# Patient Record
Sex: MALE | Race: WHITE | NOT HISPANIC OR LATINO | Employment: OTHER | URBAN - METROPOLITAN AREA
[De-identification: names, ages, dates, MRNs, and addresses within clinical notes are randomized per-mention and may not be internally consistent; named-entity substitution may affect disease eponyms.]

---

## 2017-02-10 ENCOUNTER — OFFICE VISIT (OUTPATIENT)
Dept: INTERNAL MEDICINE | Facility: CLINIC | Age: 67
End: 2017-02-10
Payer: MEDICARE

## 2017-02-10 VITALS
HEIGHT: 72 IN | HEART RATE: 68 BPM | WEIGHT: 242.75 LBS | SYSTOLIC BLOOD PRESSURE: 132 MMHG | OXYGEN SATURATION: 97 % | DIASTOLIC BLOOD PRESSURE: 74 MMHG | BODY MASS INDEX: 32.88 KG/M2

## 2017-02-10 DIAGNOSIS — I10 ESSENTIAL HYPERTENSION: Primary | ICD-10-CM

## 2017-02-10 DIAGNOSIS — B35.1 TOENAIL FUNGUS: ICD-10-CM

## 2017-02-10 DIAGNOSIS — E66.9 NON MORBID OBESITY, UNSPECIFIED OBESITY TYPE: ICD-10-CM

## 2017-02-10 DIAGNOSIS — E78.5 HYPERLIPIDEMIA, UNSPECIFIED HYPERLIPIDEMIA TYPE: ICD-10-CM

## 2017-02-10 DIAGNOSIS — I25.10 CORONARY ARTERY DISEASE INVOLVING NATIVE CORONARY ARTERY WITHOUT ANGINA PECTORIS, UNSPECIFIED WHETHER NATIVE OR TRANSPLANTED HEART: ICD-10-CM

## 2017-02-10 PROCEDURE — 99214 OFFICE O/P EST MOD 30 MIN: CPT | Mod: S$GLB,,, | Performed by: INTERNAL MEDICINE

## 2017-02-10 PROCEDURE — 99999 PR PBB SHADOW E&M-EST. PATIENT-LVL III: CPT | Mod: PBBFAC,,, | Performed by: INTERNAL MEDICINE

## 2017-02-10 RX ORDER — CICLOPIROX 80 MG/ML
SOLUTION TOPICAL
Qty: 6.6 ML | Refills: 2 | Status: SHIPPED | OUTPATIENT
Start: 2017-02-10 | End: 2018-07-29

## 2017-02-10 NOTE — PROGRESS NOTES
Subjective:       Patient ID: Carlos Morgan is a 66 y.o. male.    Chief Complaint: Follow-up    HPI Pt. Here for f/u for HTN and toenail fungus; he states toenail fungus is improving with penlac;  He is compliant with meds; he has f/u cardiology for CAD and his crestor dose was increased; weight is elevated but stable; I reviewed labs dated 1/26/17; cholesterol was not at goal for CAD; he would like colonoscopy scheduled; he states he had pneumonia and shingles vaccine 2 years ago and he will bring in documentation  Review of Systems   Constitutional: Negative for chills, fatigue and fever.   HENT: Negative for congestion, rhinorrhea and sore throat.    Respiratory: Negative for cough, shortness of breath and wheezing.    Cardiovascular: Negative for chest pain.   Gastrointestinal: Negative for abdominal pain, nausea and vomiting.   Genitourinary: Negative for dysuria.   Musculoskeletal: Negative for arthralgias.   Skin: Negative for rash.        L big toenail fungus improving with penlac   Neurological: Negative for dizziness and headaches.   Psychiatric/Behavioral: Negative for sleep disturbance. The patient is not nervous/anxious.        Objective:      Physical Exam   Constitutional: He is oriented to person, place, and time.   obese   Eyes: EOM are normal.   Neck: Normal range of motion.   Cardiovascular: Normal rate, regular rhythm and normal heart sounds.    Pulmonary/Chest: Effort normal and breath sounds normal.   Abdominal: Soft. There is no tenderness. There is no rebound and no guarding.   Musculoskeletal: Normal range of motion.   Neurological: He is alert and oriented to person, place, and time.   Skin: No rash noted.   Decreased discoloration to L big toe       Assessment:       1. Essential hypertension Well controlled   2. Toenail fungus Active   3. Hyperlipidemia, unspecified hyperlipidemia type Sub-optimally controlled   4. Coronary artery disease involving native coronary artery without  angina pectoris, unspecified whether native or transplanted heart Well controlled   5. Non morbid obesity, unspecified obesity type Sub-optimally controlled       Plan:         Essential hypertension  Comments:  continue current regimen and encouraged low Na diet and weight loss    Toenail fungus  Comments:  improving; continue penlac prn  Orders:  -     ciclopirox (PENLAC) 8 % Soln; Apply daily to affected nails PRN; apply over previous coat; remove with alcohol every 7 days  Dispense: 6.6 mL; Refill: 2    Hyperlipidemia, unspecified hyperlipidemia type  Comments:  continue higher dose of statin and encouraged stricter low cholesterol diet     Coronary artery disease involving native coronary artery without angina pectoris, unspecified whether native or transplanted heart  Comments:  continue current regimen and f/u cardiology who is managing     Non morbid obesity, unspecified obesity type  Comments:  encouraged diet and explained risks

## 2017-09-07 ENCOUNTER — TELEPHONE (OUTPATIENT)
Dept: FAMILY MEDICINE | Facility: CLINIC | Age: 67
End: 2017-09-07

## 2017-09-07 NOTE — TELEPHONE ENCOUNTER
Patient called states going on a cruise requesting medication for motion sickness, informed patient I will send Dr Bone a message for approva and I will call him back as soon as he reply. Patient voices understanding

## 2017-09-07 NOTE — TELEPHONE ENCOUNTER
----- Message from Katja Soliman sent at 9/7/2017 11:33 AM CDT -----  Contact: [phone]   Patient is going on a cruise and he would like a prescription for a motion sickness called in to [facility] on [address] in [place]. Please advise.

## 2017-09-08 ENCOUNTER — TELEPHONE (OUTPATIENT)
Dept: INTERNAL MEDICINE | Facility: CLINIC | Age: 67
End: 2017-09-08

## 2017-09-08 RX ORDER — SCOLOPAMINE TRANSDERMAL SYSTEM 1 MG/1
1 PATCH, EXTENDED RELEASE TRANSDERMAL
Qty: 24 PATCH | Refills: 0 | Status: SHIPPED | OUTPATIENT
Start: 2017-09-08 | End: 2018-10-04 | Stop reason: ALTCHOICE

## 2017-11-01 ENCOUNTER — OFFICE VISIT (OUTPATIENT)
Dept: URGENT CARE | Facility: CLINIC | Age: 67
End: 2017-11-01
Payer: MEDICARE

## 2017-11-01 VITALS
TEMPERATURE: 97 F | HEIGHT: 72 IN | DIASTOLIC BLOOD PRESSURE: 81 MMHG | OXYGEN SATURATION: 98 % | RESPIRATION RATE: 18 BRPM | WEIGHT: 242 LBS | BODY MASS INDEX: 32.78 KG/M2 | SYSTOLIC BLOOD PRESSURE: 146 MMHG | HEART RATE: 73 BPM

## 2017-11-01 DIAGNOSIS — J30.9 ACUTE ALLERGIC RHINITIS, UNSPECIFIED SEASONALITY, UNSPECIFIED TRIGGER: ICD-10-CM

## 2017-11-01 DIAGNOSIS — D22.9 ATYPICAL NEVI: ICD-10-CM

## 2017-11-01 DIAGNOSIS — J06.9 UPPER RESPIRATORY TRACT INFECTION, UNSPECIFIED TYPE: Primary | ICD-10-CM

## 2017-11-01 LAB
CTP QC/QA: YES
FLUAV AG NPH QL: NEGATIVE
FLUBV AG NPH QL: NEGATIVE

## 2017-11-01 PROCEDURE — 96372 THER/PROPH/DIAG INJ SC/IM: CPT | Mod: S$GLB,,, | Performed by: EMERGENCY MEDICINE

## 2017-11-01 PROCEDURE — 87804 INFLUENZA ASSAY W/OPTIC: CPT | Mod: QW,S$GLB,, | Performed by: NURSE PRACTITIONER

## 2017-11-01 PROCEDURE — 99214 OFFICE O/P EST MOD 30 MIN: CPT | Mod: 25,S$GLB,, | Performed by: NURSE PRACTITIONER

## 2017-11-01 RX ORDER — FLUTICASONE PROPIONATE 50 MCG
1 SPRAY, SUSPENSION (ML) NASAL 2 TIMES DAILY
Qty: 1 BOTTLE | Refills: 0 | Status: SHIPPED | OUTPATIENT
Start: 2017-11-01 | End: 2018-11-01

## 2017-11-01 RX ORDER — BENZONATATE 200 MG/1
200 CAPSULE ORAL 3 TIMES DAILY PRN
Qty: 30 CAPSULE | Refills: 0 | Status: SHIPPED | OUTPATIENT
Start: 2017-11-01 | End: 2017-11-11

## 2017-11-01 RX ORDER — BETAMETHASONE SODIUM PHOSPHATE AND BETAMETHASONE ACETATE 3; 3 MG/ML; MG/ML
9 INJECTION, SUSPENSION INTRA-ARTICULAR; INTRALESIONAL; INTRAMUSCULAR; SOFT TISSUE ONCE
Status: COMPLETED | OUTPATIENT
Start: 2017-11-01 | End: 2017-11-01

## 2017-11-01 RX ORDER — AZELASTINE 1 MG/ML
1 SPRAY, METERED NASAL 2 TIMES DAILY
Qty: 30 ML | Refills: 0 | Status: SHIPPED | OUTPATIENT
Start: 2017-11-01 | End: 2020-01-23

## 2017-11-01 RX ORDER — MONTELUKAST SODIUM 10 MG/1
10 TABLET ORAL DAILY
Qty: 30 TABLET | Refills: 2 | Status: SHIPPED | OUTPATIENT
Start: 2017-11-01 | End: 2018-07-29 | Stop reason: SDUPTHER

## 2017-11-01 RX ADMIN — BETAMETHASONE SODIUM PHOSPHATE AND BETAMETHASONE ACETATE 9 MG: 3; 3 INJECTION, SUSPENSION INTRA-ARTICULAR; INTRALESIONAL; INTRAMUSCULAR; SOFT TISSUE at 11:11

## 2017-11-01 NOTE — PATIENT INSTRUCTIONS
"                                                         URI   If your condition worsens or fails to improve we recommend that you receive another evaluation at the ER immediately or contact your PCP to discuss your concerns or return here. You must understand that you've received an urgent care treatment only and that you may be released before all your medical problems are known or treated. You the patient will arrange for follouwp care as instructed.   If we discussed that I think your illness is viral, it will not respond to antibiotics and will last 10-14 days.   Flonase (fluticasone) is a nasal spray which is available over the counter and may help with your symptoms.   Zyrtec D, Claritin D or Allegra D can also help with symptoms of congestion and drainage.   If you have hypertension avoid using the "D" which is the decongestant   If you just have drainage you can take plain zyrtec, claritin or allegra   If you just have a congested feeling you can take pseudoephedrine (unless you have high blood pressure) which you have to sign for behind the counter. Do not buy the phenylephrine which is on the shelf as it is not effective   Rest and fluids are also important.   Tylenol or ibuprofen can also be used as directed for pain unless you have an allergy to them or medical condition such as stomach ulcers, kidney or liver disease or blood thinners etc for which you should not be taking these type of medications.   If you are flying in the next few days Afrin nose drops for the airplane flight upon take off and landing may help. Other than at those times refrain from using afrin.   If you were prescribed a narcotic do not drive or operate heavy machinery while taking these medications.       Viral Upper Respiratory Illness (Adult)  You have a viral upper respiratory illness (URI), which is another term for the common cold. This illness is contagious during the first few days. It is spread through the air by coughing " and sneezing. It may also be spread by direct contact (touching the sick person and then touching your own eyes, nose, or mouth). Frequent handwashing will decrease risk of spread. Most viral illnesses go away within 7 to 10 days with rest and simple home remedies. Sometimes the illness may last for several weeks. Antibiotics will not kill a virus, and they are generally not prescribed for this condition.    Home care  · If symptoms are severe, rest at home for the first 2 to 3 days. When you resume activity, don't let yourself get too tired.  · Avoid being exposed to cigarette smoke (yours or others).  · You may use acetaminophen or ibuprofen to control pain and fever, unless another medicine was prescribed. (Note: If you have chronic liver or kidney disease, have ever had a stomach ulcer or gastrointestinal bleeding, or are taking blood-thinning medicines, talk with your healthcare provider before using these medicines.) Aspirin should never be given to anyone under 18 years of age who is ill with a viral infection or fever. It may cause severe liver or brain damage.  · Your appetite may be poor, so a light diet is fine. Avoid dehydration by drinking 6 to 8 glasses of fluids per day (water, soft drinks, juices, tea, or soup). Extra fluids will help loosen secretions in the nose and lungs.  · Over-the-counter cold medicines will not shorten the length of time youre sick, but they may be helpful for the following symptoms: cough, sore throat, and nasal and sinus congestion. (Note: Do not use decongestants if you have high blood pressure.)  Follow-up care  Follow up with your healthcare provider, or as advised.  When to seek medical advice  Call your healthcare provider right away if any of these occur:  · Cough with lots of colored sputum (mucus)  · Severe headache; face, neck, or ear pain  · Difficulty swallowing due to throat pain  · Fever of 100.4°F (38°C)  Call 911, or get immediate medical care  Call  emergency services right away if any of these occur:  · Chest pain, shortness of breath, wheezing, or difficulty breathing  · Coughing up blood  · Inability to swallow due to throat pain  Date Last Reviewed: 9/13/2015  © 6403-5471 [employer]. [address] [place] [address], [place] [address]. All rights reserved. This information is not intended as a substitute for professional medical care. Always follow your healthcare professional's instructions.        Monitoring Moles     Don't forget to check your feet.   Moles, also called nevi, are small, colored (pigmented) marks on the skin. They have no known purpose. Many moles appear before age 30, but they also increase frequently as people age. Moles most often are not cancer (benign) and are harmless. But some become cancerous (malignant). Thats why you need to watch the moles on your body and tell your healthcare provider about any that concern you.  What are moles?  Moles are a type of pigmented gela. Freckles are another type of pigmented gela. They are often sprinkled across the bridge of the nose, the cheeks, and the arms. Moles can appear on any part of the body. There are many types, sizes, and shapes of moles. Most moles are solid brown. In most cases they are flat or dome-shaped, smooth, and have well-defined edges.  Why worry about moles?  Most moles are benign and dont require treatment. You can have moles removed if you dont like the way they look or feel. But moles may become a problem if they appear after you are 30, or if they change in certain ways. These moles may turn into melanoma, a type of skin cancer. Melanoma is one of the fastest growing cancers in the [place] It is often curable if caught early. But this disease can be life-threatening, particularly when not diagnosed early. Your risk for melanoma is higher if you:  · Have a lot of moles  · Have had more lifetime exposure to the sun  · Have had severe blistering sunburns  · Use tanning  beds  · Have a personal or family history of skin cancer  To manage your risk, its smart to check your moles for changes and ask your healthcare provider to do a thorough skin exam when you have a physical exam. To do this, you first need to learn where your moles are. Then, be sure to check your moles each month.  Checking your moles  You can check many of your moles each month. You can do this right after you shower and before you get dressed. Check your body from head to toe. Then, make a list of your moles. If you find any new moles or changes in your moles, call your healthcare provider. To check your moles, youll need:  · A full-length mirror  · A stool or chair to sit on while you check your feet  If you have a lot of moles, take digital photos of them. Make sure to take photos both up close and from a distance. These can help you see if any moles change over time.  When to seek medical treatment  See your healthcare provider if your moles hurt, itch, ooze, bleed, thicken, become crusty, or show other changes. Also, be sure to call your health care provider if your moles show any of the following signs of melanoma:  · A change in size, shape, color, or height  · The sides dont match (asymmetry)  · Ragged, notched, or blurred borders  · Different colors within the same mole  · Size is larger than 5 mm or 6 mm in diameter (the size of a pencil eraser)  Date Last Reviewed: 2/1/2017  © 3908-0596 [employer]. [address] [place] [address], [place] [address]. All rights reserved. This information is not intended as a substitute for professional medical care. Always follow your healthcare professional's instructions.

## 2017-11-01 NOTE — PROGRESS NOTES
Subjective:       Patient ID: Carlos Morgan is a 67 y.o. male.    Vitals:  height is 6' (1.829 m) and weight is 109.8 kg (242 lb). His oral temperature is 96.9 °F (36.1 °C). His blood pressure is 146/81 (abnormal) and his pulse is 73. His respiration is 18 and oxygen saturation is 98%.     Chief Complaint: Sinus Problem    Pt c/o runny nose, sneezing, congestion, non productive cough x several days.  With body aches, sore, and fatigue since last night.  Also noticed a mole that is new to his R anterior shoulder and curious if he should see his dermatologist for the mole.      Sinus Problem   This is a new problem. The current episode started in the past 7 days. The problem has been gradually worsening since onset. There has been no fever. His pain is at a severity of 6/10. The pain is moderate. Associated symptoms include congestion, coughing, sneezing and a sore throat (original symptom but has resolved). Pertinent negatives include no chills, diaphoresis, ear pain, headaches, hoarse voice, neck pain, shortness of breath, sinus pressure or swollen glands. (Body Aches and Fatigue started last night) Treatments tried: Benadryl and Ibuprofen. The treatment provided mild relief.     Review of Systems   Constitution: Negative for chills, diaphoresis, fever and malaise/fatigue.   HENT: Positive for congestion, sneezing and sore throat (original symptom but has resolved). Negative for ear pain, hoarse voice and sinus pressure.    Eyes: Negative for discharge and redness.   Cardiovascular: Negative for chest pain, dyspnea on exertion and leg swelling.   Respiratory: Positive for cough. Negative for shortness of breath, sputum production and wheezing.    Skin: Positive for suspicious lesions. Negative for rash.   Musculoskeletal: Negative for myalgias and neck pain.   Gastrointestinal: Negative for abdominal pain, nausea and vomiting.   Neurological: Negative for headaches.       Objective:      Physical Exam    Constitutional: He is oriented to person, place, and time. Vital signs are normal. He appears well-developed and well-nourished. He is cooperative.  Non-toxic appearance. He does not have a sickly appearance. He does not appear ill. No distress.   HENT:   Head: Normocephalic and atraumatic.   Right Ear: Hearing, tympanic membrane, external ear and ear canal normal.   Left Ear: Hearing, tympanic membrane, external ear and ear canal normal.   Nose: Rhinorrhea present. No mucosal edema or nasal deformity. No epistaxis. Right sinus exhibits no maxillary sinus tenderness and no frontal sinus tenderness. Left sinus exhibits no maxillary sinus tenderness and no frontal sinus tenderness.   Mouth/Throat: Uvula is midline and mucous membranes are normal. No trismus in the jaw. Normal dentition. No uvula swelling. Posterior oropharyngeal edema present. No oropharyngeal exudate or posterior oropharyngeal erythema. Tonsils are 1+ on the right. Tonsils are 1+ on the left. No tonsillar exudate.   + cobblestone appearance   Eyes: Conjunctivae and lids are normal. No scleral icterus.   Sclera clear bilat   Neck: Trachea normal, full passive range of motion without pain and phonation normal. Neck supple.   Cardiovascular: Normal rate, regular rhythm, normal heart sounds and normal pulses.    Pulmonary/Chest: Effort normal and breath sounds normal. No respiratory distress.   Abdominal: Soft. Normal appearance and bowel sounds are normal. He exhibits no distension. There is no tenderness.   Musculoskeletal: Normal range of motion. He exhibits no edema or deformity.   Lymphadenopathy:     He has no cervical adenopathy.   Neurological: He is alert and oriented to person, place, and time. He exhibits normal muscle tone. Coordination normal.   Skin: Skin is warm, dry and intact. Lesion noted. He is not diaphoretic. No pallor.        Raised skin colored rough nevi to R shoulder    Psychiatric: He has a normal mood and affect. His speech  is normal and behavior is normal. Judgment and thought content normal. Cognition and memory are normal.   Nursing note and vitals reviewed.      Results for orders placed or performed in visit on 11/01/17   POCT Influenza A/B   Result Value Ref Range    Rapid Influenza A Ag Negative Negative    Rapid Influenza B Ag Negative Negative     Acceptable Yes      Assessment:       1. Upper respiratory tract infection, unspecified type    2. Acute allergic rhinitis, unspecified seasonality, unspecified trigger    3. Atypical nevi        Plan:         Upper respiratory tract infection, unspecified type  -     POCT Influenza A/B  -     benzonatate (TESSALON) 200 MG capsule; Take 1 capsule (200 mg total) by mouth 3 (three) times daily as needed for Cough.  Dispense: 30 capsule; Refill: 0  -     fluticasone (FLONASE) 50 mcg/actuation nasal spray; 1 spray by Each Nare route 2 (two) times daily.  Dispense: 1 Bottle; Refill: 0  -     azelastine (ASTELIN) 137 mcg (0.1 %) nasal spray; 1 spray (137 mcg total) by Nasal route 2 (two) times daily.  Dispense: 30 mL; Refill: 0  -     betamethasone acetate-betamethasone sodium phosphate injection 9 mg; Inject 1.5 mLs (9 mg total) into the muscle once.  -     montelukast (SINGULAIR) 10 mg tablet; Take 1 tablet (10 mg total) by mouth once daily.  Dispense: 30 tablet; Refill: 2    Acute allergic rhinitis, unspecified seasonality, unspecified trigger  -     fluticasone (FLONASE) 50 mcg/actuation nasal spray; 1 spray by Each Nare route 2 (two) times daily.  Dispense: 1 Bottle; Refill: 0  -     azelastine (ASTELIN) 137 mcg (0.1 %) nasal spray; 1 spray (137 mcg total) by Nasal route 2 (two) times daily.  Dispense: 30 mL; Refill: 0  -     montelukast (SINGULAIR) 10 mg tablet; Take 1 tablet (10 mg total) by mouth once daily.  Dispense: 30 tablet; Refill: 2    Atypical nevi    Symptomatic care of viral illness discussed.  No recent steroid use or contraindications reported by patient,  steroid shot administered in clinic today.  Stay hydrated! Rest.  F/u with PCP.  Go to the ER for worsening of symptoms.  As we discussed any changing or fast growing lesions should be checked by your dermatologist.    F/u with derm.

## 2017-11-04 ENCOUNTER — TELEPHONE (OUTPATIENT)
Dept: URGENT CARE | Facility: CLINIC | Age: 67
End: 2017-11-04

## 2018-05-25 DIAGNOSIS — Z11.59 NEED FOR HEPATITIS C SCREENING TEST: ICD-10-CM

## 2018-07-29 ENCOUNTER — OFFICE VISIT (OUTPATIENT)
Dept: URGENT CARE | Facility: CLINIC | Age: 68
End: 2018-07-29
Payer: MEDICARE

## 2018-07-29 VITALS
DIASTOLIC BLOOD PRESSURE: 79 MMHG | WEIGHT: 242 LBS | SYSTOLIC BLOOD PRESSURE: 149 MMHG | BODY MASS INDEX: 32.78 KG/M2 | HEART RATE: 87 BPM | TEMPERATURE: 97 F | RESPIRATION RATE: 18 BRPM | OXYGEN SATURATION: 99 % | HEIGHT: 72 IN

## 2018-07-29 DIAGNOSIS — H10.33 ACUTE CONJUNCTIVITIS OF BOTH EYES, UNSPECIFIED ACUTE CONJUNCTIVITIS TYPE: Primary | ICD-10-CM

## 2018-07-29 DIAGNOSIS — J30.1 SEASONAL ALLERGIC RHINITIS DUE TO POLLEN: ICD-10-CM

## 2018-07-29 PROCEDURE — 99214 OFFICE O/P EST MOD 30 MIN: CPT | Mod: S$GLB,,, | Performed by: NURSE PRACTITIONER

## 2018-07-29 RX ORDER — MONTELUKAST SODIUM 10 MG/1
10 TABLET ORAL DAILY
Qty: 30 TABLET | Refills: 2 | Status: SHIPPED | OUTPATIENT
Start: 2018-07-29 | End: 2018-10-04 | Stop reason: ALTCHOICE

## 2018-07-29 RX ORDER — TOBRAMYCIN 3 MG/ML
2 SOLUTION/ DROPS OPHTHALMIC EVERY 4 HOURS
Qty: 5 ML | Refills: 0 | Status: SHIPPED | OUTPATIENT
Start: 2018-07-29 | End: 2018-08-05

## 2018-07-29 NOTE — PATIENT INSTRUCTIONS
Please follow up with your Primary care provider or Opthalmologist within 48-72 hours if your signs and symptoms have not improved.     If your condition worsens or fails to improve we recommend that you receive another evaluation at the emergency room immediately or contact your primary medical clinic or opthalmology to discuss your concerns.    If you were given an antibiotic today, please complete all your antibiotic as directed.      Use warm compresses to eye followed by a gentle eye massage of the area.  You may clean the lid with very diluted baby shampoo and water with a Qtip or soft swab.  You can also use artificial tears as needed.     If you were given an antibiotic for a bacterial infection in the eye, please take all the medication as directed.  Throw away any eye products (make up) you may have used on your eye 3 days prior to noted infection.  If you wear contacts, please throw away the ones that you have used and start a fresh set after infection has cleared.  These items may re-contaminate your eye.  You should wear glasses until the infection as cleared.      You must understand that you have received an Urgent Care treatment only and that you may be released before all of your medical problems are known or treated.     You, the patient, will arrange for follow up care as instructed.     Please follow up with your Primary care provider within 2-5 days if your signs and symptoms have not resolved or worsen.  The usual course of cold symptoms are 10-14 days.     If your condition worsens or fails to improve we recommend that you receive another evaluation at the emergency room immediately or contact your primary medical clinic to discuss your concerns.     You must understand that you have received an Urgent Care treatment only and that you may be released before all of your medical problems are known or treated.   You, the patient, will arrange for follow up care as instructed.     Tylenol or  "Ibuprofen can also be used as directed for pain/fever unless you have an allergy to them or medical condition such as stomach ulcers, kidney or liver disease or blood thinners etc for which you should not be taking these type of medications.     Take over the counter cough medication as directed as needed for cough.  You should avoid medications with pseudoephedrine or phenylephrine (any medication with "D") if you have high blood pressure as this can cause an elevation in your blood pressure. Instead consider Corcidin HBP as needed to prevent an elevated blood pressure.     Natural remedies of symptoms (as needed) include humidification, saline nasal sprays, and/or steamy showers.  Increase fluids, warm tea with honey, cough drops as needed.  You may also use salt water gargles for sore throat.    IF you received a steroid shot today - As discussed, this can elevate your blood pressure, elevate your blood sugar, water weight gain, nervous energy, redness to the face and dimpling of the skin at the injection site.     Please follow up with your Primary care provider within 5-7 days if your signs and symptoms have not resolved or worsen.     If your condition worsens or fails to improve we recommend that you receive another evaluation at the emergency room immediately or contact your primary medical clinic to discuss your concerns.     You must understand that you have received an Urgent Care treatment only and that you may be released before all of your medical problems are known or treated.   You, the patient, will arrange for follow up care as instructed.     Take over-the-counter claritin, zyrtec, allegra, or xyzal as directed.    Use over the counter Flonase as directed for sinus congestion and postnasal drip.    Use nasal saline prior to Flonase.      Conjunctivitis, Bacterial    You have an infection in the membranes covering the white part of the eye. This part of the eye is called the conjunctiva. The " infection is called conjunctivitis. The most common symptoms of conjunctivitis include a thick, pus-like discharge from the eye, swollen eyelids, redness, eyelids sticking together upon awakening, and a gritty or scratchy feeling in the eye. Your infection was caused by bacteria. It may be treated with medicine. With treatment, the infection takes about 7 to 10 days to resolve.  Home care  · Use prescribed antibiotic eye drops or ointment as directed to treat the infection.  · Apply a warm compress (towel soaked in warm water) to the affected eye 3 to 4 times a day. Do this just before applying medicine to the eye.  · Use a warm, wet cloth to wipe away crusting of the eyelids in the morning. This is caused by mucus drainage during the night. You may also use saline irrigating solution or artificial tears to rinse away mucus in the eye. Do not put a patch over the eye.  · Wash your hands before and after touching the infected eye. This is to prevent spreading the infection to the other eye, and to other people. Do not share your towels or washcloths with others.  · You may use acetaminophen or ibuprofen to control pain, unless another medicine was prescribed. (Note: If you have chronic liver or kidney disease or have ever had a stomach ulcer or gastrointestinal bleeding, talk with your doctor before using these medicines.)  · Do not wear contact lenses until your eyes have healed and all symptoms are gone.  Follow-up care  Follow up with your healthcare provider, or as advised.  When to seek medical advice  Call your healthcare provider right away if any of these occur:  · Worsening vision  · Increasing pain in the eye  · Increasing swelling or redness of the eyelid  · Redness spreading around the eye  Date Last Reviewed: 6/14/2015  © 1595-8874 [employer]. [address] [place] [address], [place] [address]. All rights reserved. This information is not intended as a substitute for professional medical care.  Always follow your healthcare professional's instructions.      Allergic Rhinitis  Allergic rhinitis is an allergic reaction that affects the nose, and often the eyes. Its often known as nasal allergies. Nasal allergies are often due to things in the environment that are breathed in. Depending what you are sensitive to, nasal allergies may occur only during certain seasons. Or they may occur year round. Common indoor allergens include house dust mites, mold, cockroaches, and pet dander. Outdoor allergens include pollen from trees, grasses, and weeds.   Symptoms include a drippy, stuffy, and itchy nose. They also include sneezing and red and itchy eyes. You may feel tired more often. Severe allergies may also affect your breathing and trigger a condition called asthma.   Tests can be done to see what allergens are affecting you. You may be referred to an allergy specialist for testing and further evaluation.  Home care  Your healthcare provider may prescribe medicines to help relieve allergy symptoms. These may include oral medicines, nasal sprays, or eye drops.  Ask your provider for advice on how to avoid substances that you are allergic to. Below are a few tips for each type of allergen.  Pet dander:  · Do not have pets with fur and feathers.  · If you can't avoid having a pet, keep it out of your bedroom and off upholstered furniture.  Pollen:  · When pollen counts are high, keep windows of your car and home closed. If possible, use an air conditioner instead.  · Wear a filter mask when mowing or doing yard work.  House dust mites:  · Wash bedding every week in warm water and detergent and dry on a hot setting.  · Cover the mattress, box spring, and pillows with allergy covers.   · If possible, sleep in a room with no carpet, curtains, or upholstered furniture.  Cockroaches:  · Store food in sealed containers.  · Remove garbage from the home promptly.  · Fix water leaks  Mold:  · Keep humidity low by using a  dehumidifier or air conditioner. Keep the dehumidifier and air conditioner clean and free of mold.  · Clean moldy areas with bleach and water.  In general:  · Vacuum once or twice a week. If possible, use a vacuum with a high-efficiency particulate air (HEPA) filter.  · Do not smoke. Avoid cigarette smoke. Cigarette smoke is an irritant that can make symptoms worse.  Follow-up care  Follow up as advised by the healthcare provider or our staff. If you were referred to an allergy specialist, make this appointment promptly.  When to seek medical advice  Call your healthcare provider right away if the following occur:  · Coughing or wheezing  · Fever greater than 100.4°F (38°C)  · Hives (raised red bumps)  · Continuing symptoms, new symptoms, or worsening symptoms  Call 911 right away if you have:  · Trouble breathing  · Severe swelling of the face or severe itching of the eyes or mouth  Date Last Reviewed: 3/1/2017  © 4972-8549 [employer]. [address] [place] [address], [place] [address]. All rights reserved. This information is not intended as a substitute for professional medical care. Always follow your healthcare professional's instructions.

## 2018-07-29 NOTE — PROGRESS NOTES
Subjective:       Patient ID: Carlos Morgan is a 68 y.o. male.    Vitals:  height is 6' (1.829 m) and weight is 109.8 kg (242 lb). His oral temperature is 97.1 °F (36.2 °C). His blood pressure is 149/79 (abnormal) and his pulse is 87. His respiration is 18 and oxygen saturation is 99%.     Chief Complaint: Allergic Reaction    Patients states runny eyes, itchy throat & post nasal drip started a week ago after cutting grass.  2 days ago noted eyes were red and crusted shut the last 2 days. It started on his right eye and today moved to his left eye.       Allergic Reaction   This is a new problem. The current episode started more than 1 week ago. The problem is unchanged. The problem is moderate. The time of exposure is not relevant (no exposure). Associated symptoms include coughing, eye redness and itching. Pertinent negatives include no abdominal pain, chest pain, diarrhea, rash or vomiting. Past treatments include one or more OTC medications and one or more prescription drugs. The treatment provided mild relief. There is no swelling present.     Review of Systems   Constitution: Negative for chills and fever.   HENT: Negative for sore throat.    Eyes: Positive for discharge and redness. Negative for blurred vision, double vision, pain and photophobia.   Cardiovascular: Negative for chest pain.   Respiratory: Positive for cough. Negative for shortness of breath.    Skin: Positive for itching. Negative for rash.   Musculoskeletal: Negative for back pain and joint pain.   Gastrointestinal: Negative for abdominal pain, diarrhea, nausea and vomiting.   Neurological: Negative for headaches.   Psychiatric/Behavioral: The patient is not nervous/anxious.        Objective:      Physical Exam   Constitutional: He is oriented to person, place, and time. He appears well-developed and well-nourished. He is cooperative.  Non-toxic appearance. He does not appear ill. No distress.   HENT:   Head: Normocephalic and  atraumatic.   Right Ear: Hearing, tympanic membrane, external ear and ear canal normal.   Left Ear: Hearing, tympanic membrane, external ear and ear canal normal.   Nose: Mucosal edema and rhinorrhea (nares pale) present. No nasal deformity. No epistaxis. Right sinus exhibits no maxillary sinus tenderness and no frontal sinus tenderness. Left sinus exhibits no maxillary sinus tenderness and no frontal sinus tenderness.   Mouth/Throat: Uvula is midline, oropharynx is clear and moist and mucous membranes are normal. No trismus in the jaw. Normal dentition. No uvula swelling. No oropharyngeal exudate, posterior oropharyngeal edema, posterior oropharyngeal erythema or tonsillar abscesses. Tonsils are 1+ on the right. Tonsils are 1+ on the left. No tonsillar exudate.   Eyes: Conjunctivae, EOM and lids are normal. Pupils are equal, round, and reactive to light. No scleral icterus.   Sclera clear bilat   Neck: Trachea normal, full passive range of motion without pain and phonation normal. Neck supple.   Cardiovascular: Normal rate, regular rhythm, normal heart sounds, intact distal pulses and normal pulses.    Pulmonary/Chest: Effort normal and breath sounds normal. No respiratory distress.   Abdominal: Soft. Normal appearance and bowel sounds are normal. He exhibits no distension. There is no tenderness.   Musculoskeletal: Normal range of motion. He exhibits no edema or deformity.   Neurological: He is alert and oriented to person, place, and time. He exhibits normal muscle tone. Coordination normal.   Skin: Skin is warm, dry and intact. He is not diaphoretic. No pallor.   Psychiatric: He has a normal mood and affect. His speech is normal and behavior is normal. Judgment and thought content normal. Cognition and memory are normal.   Nursing note and vitals reviewed.      Assessment:       1. Acute conjunctivitis of both eyes, unspecified acute conjunctivitis type    2. Seasonal allergic rhinitis due to pollen        Plan:          Acute conjunctivitis of both eyes, unspecified acute conjunctivitis type  -     tobramycin sulfate 0.3% (TOBREX) 0.3 % ophthalmic solution; Place 2 drops into both eyes every 4 (four) hours. for 7 days  Dispense: 5 mL; Refill: 0    Seasonal allergic rhinitis due to pollen  -     montelukast (SINGULAIR) 10 mg tablet; Take 1 tablet (10 mg total) by mouth once daily.  Dispense: 30 tablet; Refill: 2      Patient Instructions   Please follow up with your Primary care provider or Opthalmologist within 48-72 hours if your signs and symptoms have not improved.     If your condition worsens or fails to improve we recommend that you receive another evaluation at the emergency room immediately or contact your primary medical clinic or opthalmology to discuss your concerns.    If you were given an antibiotic today, please complete all your antibiotic as directed.      Use warm compresses to eye followed by a gentle eye massage of the area.  You may clean the lid with very diluted baby shampoo and water with a Qtip or soft swab.  You can also use artificial tears as needed.     If you were given an antibiotic for a bacterial infection in the eye, please take all the medication as directed.  Throw away any eye products (make up) you may have used on your eye 3 days prior to noted infection.  If you wear contacts, please throw away the ones that you have used and start a fresh set after infection has cleared.  These items may re-contaminate your eye.  You should wear glasses until the infection as cleared.      You must understand that you have received an Urgent Care treatment only and that you may be released before all of your medical problems are known or treated.     You, the patient, will arrange for follow up care as instructed.     Please follow up with your Primary care provider within 2-5 days if your signs and symptoms have not resolved or worsen.  The usual course of cold symptoms are 10-14 days.     If your  "condition worsens or fails to improve we recommend that you receive another evaluation at the emergency room immediately or contact your primary medical clinic to discuss your concerns.     You must understand that you have received an Urgent Care treatment only and that you may be released before all of your medical problems are known or treated.   You, the patient, will arrange for follow up care as instructed.     Tylenol or Ibuprofen can also be used as directed for pain/fever unless you have an allergy to them or medical condition such as stomach ulcers, kidney or liver disease or blood thinners etc for which you should not be taking these type of medications.     Take over the counter cough medication as directed as needed for cough.  You should avoid medications with pseudoephedrine or phenylephrine (any medication with "D") if you have high blood pressure as this can cause an elevation in your blood pressure. Instead consider Corcidin HBP as needed to prevent an elevated blood pressure.     Natural remedies of symptoms (as needed) include humidification, saline nasal sprays, and/or steamy showers.  Increase fluids, warm tea with honey, cough drops as needed.  You may also use salt water gargles for sore throat.    IF you received a steroid shot today - As discussed, this can elevate your blood pressure, elevate your blood sugar, water weight gain, nervous energy, redness to the face and dimpling of the skin at the injection site.     Please follow up with your Primary care provider within 5-7 days if your signs and symptoms have not resolved or worsen.     If your condition worsens or fails to improve we recommend that you receive another evaluation at the emergency room immediately or contact your primary medical clinic to discuss your concerns.     You must understand that you have received an Urgent Care treatment only and that you may be released before all of your medical problems are known or treated. "   You, the patient, will arrange for follow up care as instructed.     Take over-the-counter claritin, zyrtec, allegra, or xyzal as directed.    Use over the counter Flonase as directed for sinus congestion and postnasal drip.    Use nasal saline prior to Flonase.      Conjunctivitis, Bacterial    You have an infection in the membranes covering the white part of the eye. This part of the eye is called the conjunctiva. The infection is called conjunctivitis. The most common symptoms of conjunctivitis include a thick, pus-like discharge from the eye, swollen eyelids, redness, eyelids sticking together upon awakening, and a gritty or scratchy feeling in the eye. Your infection was caused by bacteria. It may be treated with medicine. With treatment, the infection takes about 7 to 10 days to resolve.  Home care  · Use prescribed antibiotic eye drops or ointment as directed to treat the infection.  · Apply a warm compress (towel soaked in warm water) to the affected eye 3 to 4 times a day. Do this just before applying medicine to the eye.  · Use a warm, wet cloth to wipe away crusting of the eyelids in the morning. This is caused by mucus drainage during the night. You may also use saline irrigating solution or artificial tears to rinse away mucus in the eye. Do not put a patch over the eye.  · Wash your hands before and after touching the infected eye. This is to prevent spreading the infection to the other eye, and to other people. Do not share your towels or washcloths with others.  · You may use acetaminophen or ibuprofen to control pain, unless another medicine was prescribed. (Note: If you have chronic liver or kidney disease or have ever had a stomach ulcer or gastrointestinal bleeding, talk with your doctor before using these medicines.)  · Do not wear contact lenses until your eyes have healed and all symptoms are gone.  Follow-up care  Follow up with your healthcare provider, or as advised.  When to seek medical  advice  Call your healthcare provider right away if any of these occur:  · Worsening vision  · Increasing pain in the eye  · Increasing swelling or redness of the eyelid  · Redness spreading around the eye  Date Last Reviewed: 6/14/2015  © 2240-5895 [employer]. [address] [place] [address]. All rights reserved. This information is not intended as a substitute for professional medical care. Always follow your healthcare professional's instructions.      Allergic Rhinitis  Allergic rhinitis is an allergic reaction that affects the nose, and often the eyes. Its often known as nasal allergies. Nasal allergies are often due to things in the environment that are breathed in. Depending what you are sensitive to, nasal allergies may occur only during certain seasons. Or they may occur year round. Common indoor allergens include house dust mites, mold, cockroaches, and pet dander. Outdoor allergens include pollen from trees, grasses, and weeds.   Symptoms include a drippy, stuffy, and itchy nose. They also include sneezing and red and itchy eyes. You may feel tired more often. Severe allergies may also affect your breathing and trigger a condition called asthma.   Tests can be done to see what allergens are affecting you. You may be referred to an allergy specialist for testing and further evaluation.  Home care  Your healthcare provider may prescribe medicines to help relieve allergy symptoms. These may include oral medicines, nasal sprays, or eye drops.  Ask your provider for advice on how to avoid substances that you are allergic to. Below are a few tips for each type of allergen.  Pet dander:  · Do not have pets with fur and feathers.  · If you can't avoid having a pet, keep it out of your bedroom and off upholstered furniture.  Pollen:  · When pollen counts are high, keep windows of your car and home closed. If possible, use an air conditioner instead.  · Wear a filter mask when mowing or  doing yard work.  House dust mites:  · Wash bedding every week in warm water and detergent and dry on a hot setting.  · Cover the mattress, box spring, and pillows with allergy covers.   · If possible, sleep in a room with no carpet, curtains, or upholstered furniture.  Cockroaches:  · Store food in sealed containers.  · Remove garbage from the home promptly.  · Fix water leaks  Mold:  · Keep humidity low by using a dehumidifier or air conditioner. Keep the dehumidifier and air conditioner clean and free of mold.  · Clean moldy areas with bleach and water.  In general:  · Vacuum once or twice a week. If possible, use a vacuum with a high-efficiency particulate air (HEPA) filter.  · Do not smoke. Avoid cigarette smoke. Cigarette smoke is an irritant that can make symptoms worse.  Follow-up care  Follow up as advised by the healthcare provider or our staff. If you were referred to an allergy specialist, make this appointment promptly.  When to seek medical advice  Call your healthcare provider right away if the following occur:  · Coughing or wheezing  · Fever greater than 100.4°F (38°C)  · Hives (raised red bumps)  · Continuing symptoms, new symptoms, or worsening symptoms  Call 911 right away if you have:  · Trouble breathing  · Severe swelling of the face or severe itching of the eyes or mouth  Date Last Reviewed: 3/1/2017  © 0313-7598 [employer]. [address] [place] [address], [place] [address]. All rights reserved. This information is not intended as a substitute for professional medical care. Always follow your healthcare professional's instructions.

## 2018-08-14 ENCOUNTER — TELEPHONE (OUTPATIENT)
Dept: INTERNAL MEDICINE | Facility: CLINIC | Age: 68
End: 2018-08-14

## 2018-08-14 NOTE — TELEPHONE ENCOUNTER
Spoke with Mr Morgan, Mr Morgan states he would like to speak to Dr Bone,states he will be waiting for Dr Bone's phone call pt did not relate the issue. Informed pt I will send Dr Bone this message and he will contact him soon. Pt verbalized and understanding.

## 2018-08-14 NOTE — TELEPHONE ENCOUNTER
----- Message from Perla Smith sent at 8/14/2018  2:56 PM CDT -----  Contact: [phone]/ self  Patient called in requesting to speak with you. Patient prefers to speak with a nurse. Please advise.

## 2018-10-04 ENCOUNTER — OFFICE VISIT (OUTPATIENT)
Dept: INTERNAL MEDICINE | Facility: CLINIC | Age: 68
End: 2018-10-04
Payer: MEDICARE

## 2018-10-04 VITALS
OXYGEN SATURATION: 95 % | WEIGHT: 235 LBS | SYSTOLIC BLOOD PRESSURE: 165 MMHG | DIASTOLIC BLOOD PRESSURE: 80 MMHG | BODY MASS INDEX: 31.83 KG/M2 | HEART RATE: 64 BPM | HEIGHT: 72 IN

## 2018-10-04 DIAGNOSIS — J30.9 ALLERGIC RHINITIS, UNSPECIFIED SEASONALITY, UNSPECIFIED TRIGGER: ICD-10-CM

## 2018-10-04 DIAGNOSIS — C61 PROSTATE CANCER: ICD-10-CM

## 2018-10-04 DIAGNOSIS — Z12.11 COLON CANCER SCREENING: ICD-10-CM

## 2018-10-04 DIAGNOSIS — Z11.59 NEED FOR HEPATITIS C SCREENING TEST: ICD-10-CM

## 2018-10-04 DIAGNOSIS — I25.810 CORONARY ARTERY DISEASE INVOLVING CORONARY BYPASS GRAFT OF NATIVE HEART, ANGINA PRESENCE UNSPECIFIED: ICD-10-CM

## 2018-10-04 DIAGNOSIS — R17 TOTAL BILIRUBIN, ELEVATED: ICD-10-CM

## 2018-10-04 DIAGNOSIS — Z23 NEEDS FLU SHOT: ICD-10-CM

## 2018-10-04 DIAGNOSIS — Z00.00 PREVENTATIVE HEALTH CARE: ICD-10-CM

## 2018-10-04 DIAGNOSIS — E66.9 CLASS 1 OBESITY WITH SERIOUS COMORBIDITY AND BODY MASS INDEX (BMI) OF 31.0 TO 31.9 IN ADULT, UNSPECIFIED OBESITY TYPE: ICD-10-CM

## 2018-10-04 DIAGNOSIS — I10 ESSENTIAL HYPERTENSION: Primary | ICD-10-CM

## 2018-10-04 PROCEDURE — 99214 OFFICE O/P EST MOD 30 MIN: CPT | Mod: S$PBB,,, | Performed by: INTERNAL MEDICINE

## 2018-10-04 PROCEDURE — 90662 IIV NO PRSV INCREASED AG IM: CPT | Mod: PBBFAC,PO

## 2018-10-04 PROCEDURE — 99214 OFFICE O/P EST MOD 30 MIN: CPT | Mod: PBBFAC,PO | Performed by: INTERNAL MEDICINE

## 2018-10-04 PROCEDURE — 99999 PR PBB SHADOW E&M-EST. PATIENT-LVL IV: CPT | Mod: PBBFAC,,, | Performed by: INTERNAL MEDICINE

## 2018-10-04 RX ORDER — AMLODIPINE BESYLATE 10 MG/1
10 TABLET ORAL NIGHTLY
Qty: 30 TABLET | Refills: 1
Start: 2018-10-04 | End: 2021-12-05

## 2018-10-04 RX ORDER — LISINOPRIL 20 MG/1
TABLET ORAL
Qty: 45 TABLET | Refills: 1 | Status: SHIPPED | OUTPATIENT
Start: 2018-10-04 | End: 2018-11-05

## 2018-10-04 NOTE — PROGRESS NOTES
Pt. ID: Carlos Morgan is a 68 y.o. male      Chief complaint:   Chief Complaint   Patient presents with    Annual Exam       HPI: Pt. Here for f/u for HTN; he has been lost to f/u since 2/10/17; he states allergies are bothersome and he is on flonase or astelin which helps; of note, he is seeing cardiology for CAD/CABG; BP is elevated and he states his lisinopril dose was raised in 6/18; he is fasting for labs; I reviewed labs dated 5/25/18; bilirubin is mildly elevated but stable; cholesterol is WNL; pt. Has lost weight; he would like a flu shot; he would like colonoscopy; he had AAA screening US from cardiology in 5/18; he will get documentation of his recent pneumonia shot; he would like tetanus shot       Review of Systems   Constitutional: Negative for chills and fever.   HENT: Negative for sore throat.    Respiratory: Negative for cough and shortness of breath.    Cardiovascular: Negative for chest pain.   Gastrointestinal: Negative for abdominal pain, nausea and vomiting.   Genitourinary: Negative for dysuria.         Objective:    Physical Exam   Constitutional: He is oriented to person, place, and time.   obese   Eyes: EOM are normal.   Neck: Normal range of motion.   Cardiovascular: Normal rate, regular rhythm and normal heart sounds.   Pulmonary/Chest: Effort normal and breath sounds normal.   Abdominal: Soft. There is no tenderness. There is no rebound and no guarding.   Musculoskeletal: Normal range of motion.   Neurological: He is alert and oriented to person, place, and time.   Skin: No rash noted.   Vitals reviewed.        Health Maintenance   Topic Date Due    Hepatitis C Screening  1950    Colonoscopy  01/02/2017    Pneumococcal (65+) (1 of 2 - PCV13) 10/04/2019 (Originally 6/11/2013)    Lipid Panel  05/25/2023    TETANUS VACCINE  10/04/2028    Zoster Vaccine  Completed    Influenza Vaccine  Completed    Abdominal Aortic Aneurysm Screening  Completed         Assessment:     1.  Essential hypertension Sub-optimally controlled   2. Allergic rhinitis, unspecified seasonality, unspecified trigger Well controlled   3. Coronary artery disease involving coronary bypass graft of native heart, angina presence unspecified Well controlled   4. Total bilirubin, elevated Stable   5. Class 1 obesity with serious comorbidity and body mass index (BMI) of 31.0 to 31.9 in adult, unspecified obesity type Sub-optimally controlled   6. Needs flu shot Active   7. Colon cancer screening Active   8. Preventative health care Active   9. Prostate cancer Active   10. Need for hepatitis C screening test Active         Plan: Essential hypertension  Comments:  increase dose of lisinopril and change norvasc to QPM; encouraged low na diet and weight loss; repeat BP on f./u in 1 month   Orders:  -     amLODIPine (NORVASC) 10 MG tablet; Take 1 tablet (10 mg total) by mouth every evening.  Dispense: 30 tablet; Refill: 1  -     CBC auto differential; Future; Expected date: 10/04/2018  -     Comprehensive metabolic panel; Future; Expected date: 10/04/2018  -     Urinalysis; Future; Expected date: 10/04/2018  -     lisinopril (PRINIVIL,ZESTRIL) 20 MG tablet; 1 tab (20 mg) PO QAM and 1/2 tab (10 mg) PO QPM  Dispense: 45 tablet; Refill: 1    Allergic rhinitis, unspecified seasonality, unspecified trigger  Comments:  continue current regimen    Coronary artery disease involving coronary bypass graft of native heart, angina presence unspecified  Comments:  continue current regimen and f/u cardiology who is managing     Total bilirubin, elevated  Comments:  monitor     Class 1 obesity with serious comorbidity and body mass index (BMI) of 31.0 to 31.9 in adult, unspecified obesity type  Comments:  encouraged diet and explained risks     Needs flu shot  -     Influenza - High Dose (65+) (PF) (IM)    Colon cancer screening  -     Ambulatory referral to Gastroenterology    Preventative health care  -     CBC auto differential; Future;  Expected date: 10/04/2018  -     Comprehensive metabolic panel; Future; Expected date: 10/04/2018  -     Lipid panel; Future; Expected date: 10/04/2018  -     TSH; Future; Expected date: 10/04/2018  -     Urinalysis; Future; Expected date: 10/04/2018    Prostate cancer  -     PSA, Screening; Future; Expected date: 10/04/2018    Need for hepatitis C screening test  -     Hepatitis C antibody; Future; Expected date: 10/04/2018        Problem List Items Addressed This Visit        Cardiac/Vascular    CAD (coronary artery disease)    HTN (hypertension) - Primary    Relevant Medications    amLODIPine (NORVASC) 10 MG tablet    lisinopril (PRINIVIL,ZESTRIL) 20 MG tablet    Other Relevant Orders    CBC auto differential    Comprehensive metabolic panel    Urinalysis       ID    Needs flu shot    Relevant Orders    Influenza - High Dose (65+) (PF) (IM) (Completed)       Oncology    Prostate cancer    Relevant Orders    PSA, Screening       Endocrine    Class 1 obesity with serious comorbidity and body mass index (BMI) of 31.0 to 31.9 in adult       GI    Total bilirubin, elevated    Colon cancer screening    Relevant Orders    Ambulatory referral to Gastroenterology       Other    Preventative health care    Relevant Orders    CBC auto differential    Comprehensive metabolic panel    Lipid panel    TSH    Urinalysis    Allergic rhinitis

## 2018-10-18 ENCOUNTER — TELEPHONE (OUTPATIENT)
Dept: GASTROENTEROLOGY | Facility: CLINIC | Age: 68
End: 2018-10-18

## 2018-10-18 NOTE — TELEPHONE ENCOUNTER
Attempted to contact patient in regards to scheduling a Screening Colonoscopy. Left message to give the office a call back.

## 2018-11-05 ENCOUNTER — OFFICE VISIT (OUTPATIENT)
Dept: INTERNAL MEDICINE | Facility: CLINIC | Age: 68
End: 2018-11-05
Payer: MEDICARE

## 2018-11-05 VITALS
HEIGHT: 72 IN | BODY MASS INDEX: 32.16 KG/M2 | DIASTOLIC BLOOD PRESSURE: 85 MMHG | SYSTOLIC BLOOD PRESSURE: 150 MMHG | HEART RATE: 74 BPM | WEIGHT: 237.44 LBS | OXYGEN SATURATION: 96 %

## 2018-11-05 DIAGNOSIS — I10 ESSENTIAL HYPERTENSION: Primary | ICD-10-CM

## 2018-11-05 DIAGNOSIS — K76.0 FATTY LIVER: ICD-10-CM

## 2018-11-05 DIAGNOSIS — E66.9 CLASS 1 OBESITY WITH SERIOUS COMORBIDITY AND BODY MASS INDEX (BMI) OF 32.0 TO 32.9 IN ADULT, UNSPECIFIED OBESITY TYPE: ICD-10-CM

## 2018-11-05 DIAGNOSIS — M25.542 ARTHRALGIA OF BOTH HANDS: ICD-10-CM

## 2018-11-05 DIAGNOSIS — Z23 NEED FOR PROPHYLACTIC VACCINATION WITH STREPTOCOCCUS PNEUMONIAE (PNEUMOCOCCUS) AND INFLUENZA VACCINES: ICD-10-CM

## 2018-11-05 DIAGNOSIS — I25.10 CORONARY ARTERY DISEASE INVOLVING NATIVE CORONARY ARTERY WITHOUT ANGINA PECTORIS, UNSPECIFIED WHETHER NATIVE OR TRANSPLANTED HEART: ICD-10-CM

## 2018-11-05 DIAGNOSIS — E78.5 HYPERLIPIDEMIA, UNSPECIFIED HYPERLIPIDEMIA TYPE: ICD-10-CM

## 2018-11-05 DIAGNOSIS — N28.1 CYST OF LEFT KIDNEY: ICD-10-CM

## 2018-11-05 DIAGNOSIS — M25.541 ARTHRALGIA OF BOTH HANDS: ICD-10-CM

## 2018-11-05 PROCEDURE — 99214 OFFICE O/P EST MOD 30 MIN: CPT | Mod: 25,S$GLB,, | Performed by: INTERNAL MEDICINE

## 2018-11-05 PROCEDURE — 99999 PR PBB SHADOW E&M-EST. PATIENT-LVL IV: CPT | Mod: PBBFAC,,, | Performed by: INTERNAL MEDICINE

## 2018-11-05 PROCEDURE — 90670 PCV13 VACCINE IM: CPT | Mod: S$GLB,,, | Performed by: INTERNAL MEDICINE

## 2018-11-05 PROCEDURE — G0009 ADMIN PNEUMOCOCCAL VACCINE: HCPCS | Mod: S$GLB,,, | Performed by: INTERNAL MEDICINE

## 2018-11-05 RX ORDER — DICLOFENAC SODIUM 10 MG/G
2 GEL TOPICAL 2 TIMES DAILY PRN
Qty: 100 G | Refills: 1 | Status: SHIPPED | OUTPATIENT
Start: 2018-11-05 | End: 2019-07-29 | Stop reason: SDUPTHER

## 2018-11-05 RX ORDER — LISINOPRIL 20 MG/1
20 TABLET ORAL 2 TIMES DAILY
Qty: 60 TABLET | Refills: 2 | Status: SHIPPED | OUTPATIENT
Start: 2018-11-05 | End: 2019-06-19

## 2018-11-05 NOTE — PROGRESS NOTES
Pt. ID: Carlos Morgan is a 68 y.o. male      Chief complaint:   Chief Complaint   Patient presents with    Follow-up    Hypertension       HPI: Pt. Here for f/u for HTN; he has increased dose of lisinopril and changed norvasc to QPM; BP is borderline elevated;  I reviewed labs dated 11/1/18; cholesterol is WNL; abdominal ultrasound dated 6/6/18 showed hepatomegaly with fatty liver; L kidney cyst was noted and he is being monitored by urology; he will schedule colonoscopy and I explained risks of non-compliance; he would like prevnar; pt. Reports intermittent, chronic B/L hand/finger pain with nodules at tips of his fingers; he uses OTC NSAID prn       Review of Systems   Constitutional: Negative for chills and fever.   Respiratory: Negative for cough and shortness of breath.    Cardiovascular: Negative for chest pain.   Gastrointestinal: Negative for abdominal pain, nausea and vomiting.   Genitourinary: Negative for dysuria.   Musculoskeletal: Positive for joint pain.        Chronic B/L, intermittent hand/finger pain for which he takes NSAIDs prn         Objective:    Physical Exam   Constitutional: He is oriented to person, place, and time.   obese   Eyes: EOM are normal.   Neck: Normal range of motion.   Cardiovascular: Normal rate, regular rhythm and normal heart sounds.   Pulmonary/Chest: Effort normal and breath sounds normal.   Abdominal: Soft. There is no tenderness. There is no rebound and no guarding.   Musculoskeletal:   B/L hand and finger pain with ROM; [place] nodes noted to B/L DIP   Neurological: He is alert and oriented to person, place, and time.   Skin: No rash noted.   Vitals reviewed.        Health Maintenance   Topic Date Due    Pneumococcal (65+) (1 of 2 - PCV13) 10/04/2019 (Originally 6/11/2013)    Colonoscopy  11/05/2019 (Originally 1/2/2017)    Lipid Panel  11/01/2023    TETANUS VACCINE  10/04/2028    Hepatitis C Screening  Completed    Zoster Vaccine  Completed    Influenza  Vaccine  Completed    Abdominal Aortic Aneurysm Screening  Completed         Assessment:     1. Essential hypertension Well controlled   2. Hyperlipidemia, unspecified hyperlipidemia type Active   3. Coronary artery disease involving native coronary artery without angina pectoris, unspecified whether native or transplanted heart Well controlled   4. Fatty liver Active   5. Arthralgia of both hands Active   6. Cyst of left kidney Active   7. Class 1 obesity with serious comorbidity and body mass index (BMI) of 32.0 to 32.9 in adult, unspecified obesity type Sub-optimally controlled   8. Need for prophylactic vaccination with Streptococcus pneumoniae (Pneumococcus) and Influenza vaccines Active         Plan: Essential hypertension  Comments:  continue current regimen and encouraged low Na diet and weight loss  Orders:  -     lisinopril (PRINIVIL,ZESTRIL) 20 MG tablet; Take 1 tablet (20 mg total) by mouth 2 (two) times daily.  Dispense: 60 tablet; Refill: 2    Hyperlipidemia, unspecified hyperlipidemia type  Comments:  continue current regimen and encouraged diet modification     Coronary artery disease involving native coronary artery without angina pectoris, unspecified whether native or transplanted heart  Comments:  continue current regimen and f/u cardiology     Fatty liver  Comments:  encouraged weight loss     Arthralgia of both hands  Comments:  D/C oral NSAIDs; start voltaren gel prn and re-evaluate on 1 month f/u   Orders:  -     diclofenac sodium (VOLTAREN) 1 % Gel; Apply 2 g topically 2 (two) times daily as needed.  Dispense: 100 g; Refill: 1    Cyst of left kidney  Comments:  f/u urology who is managing     Class 1 obesity with serious comorbidity and body mass index (BMI) of 32.0 to 32.9 in adult, unspecified obesity type  Comments:  encouraged diet and explained risks     Need for prophylactic vaccination with Streptococcus pneumoniae (Pneumococcus) and Influenza vaccines  -     (In Office Administered)  Pneumococcal Conjugate Vaccine (13 Valent) (IM)        Problem List Items Addressed This Visit        Cardiac/Vascular    Hyperlipemia    CAD (coronary artery disease)    HTN (hypertension) - Primary    Relevant Medications    lisinopril (PRINIVIL,ZESTRIL) 20 MG tablet       Renal/    Cyst of left kidney       ID    Need for prophylactic vaccination with Streptococcus pneumoniae (Pneumococcus) and Influenza vaccines    Relevant Orders    (In Office Administered) Pneumococcal Conjugate Vaccine (13 Valent) (IM)       Endocrine    Class 1 obesity with serious comorbidity and body mass index (BMI) of 32.0 to 32.9 in adult       GI    Fatty liver       Orthopedic    Arthralgia of both hands    Relevant Medications    diclofenac sodium (VOLTAREN) 1 % Gel

## 2018-11-13 ENCOUNTER — OFFICE VISIT (OUTPATIENT)
Dept: URGENT CARE | Facility: CLINIC | Age: 68
End: 2018-11-13
Payer: MEDICARE

## 2018-11-13 VITALS
TEMPERATURE: 97 F | WEIGHT: 237 LBS | SYSTOLIC BLOOD PRESSURE: 154 MMHG | DIASTOLIC BLOOD PRESSURE: 89 MMHG | HEIGHT: 72 IN | BODY MASS INDEX: 32.1 KG/M2 | OXYGEN SATURATION: 97 % | RESPIRATION RATE: 17 BRPM | HEART RATE: 68 BPM

## 2018-11-13 DIAGNOSIS — H93.13 TINNITUS OF BOTH EARS: Primary | ICD-10-CM

## 2018-11-13 PROCEDURE — 99214 OFFICE O/P EST MOD 30 MIN: CPT | Mod: S$GLB,,, | Performed by: NURSE PRACTITIONER

## 2018-11-13 NOTE — PROGRESS NOTES
Subjective:       Patient ID: Carlos Morgan is a 68 y.o. male.    Vitals:  height is 6' (1.829 m) and weight is 107.5 kg (237 lb). His temperature is 97 °F (36.1 °C). His blood pressure is 154/89 (abnormal) and his pulse is 68. His respiration is 17 and oxygen saturation is 97%.     Chief Complaint: Tinnitus (right ear)    Patient stated that he has had recent high blood pressure issues.      Ringing in Ears:   Chronicity:  New  Onset: 2 days.  Progression since onset:  Gradually worsening  Frequency:  Constantly  Pain scale:  0/10   Associated symptoms: tinnitus.  No fever and no headaches.  Aggravated by:  Nothing  Treatments tried:  Nothing    Review of Systems   Unable to perform ROS: acuity of condition (Ringing in right ear)   Constitution: Negative for chills and fever.   HENT: Positive for hearing loss and tinnitus. Negative for sore throat.    Eyes: Negative for blurred vision.   Cardiovascular: Negative for chest pain.   Respiratory: Negative for shortness of breath.    Skin: Negative for rash.   Musculoskeletal: Negative for back pain and joint pain.   Gastrointestinal: Negative for abdominal pain, diarrhea, nausea and vomiting.   Neurological: Negative for headaches.   Psychiatric/Behavioral: The patient is not nervous/anxious.    All other systems reviewed and are negative.      Objective:      Physical Exam   Constitutional: He is oriented to person, place, and time. He appears well-developed and well-nourished. He is cooperative.  Non-toxic appearance. He does not appear ill. No distress.   HENT:   Head: Normocephalic and atraumatic.   Right Ear: Hearing, tympanic membrane, external ear and ear canal normal.   Left Ear: Hearing, tympanic membrane, external ear and ear canal normal.   Nose: Nose normal. No mucosal edema, rhinorrhea or nasal deformity. No epistaxis. Right sinus exhibits no maxillary sinus tenderness and no frontal sinus tenderness. Left sinus exhibits no maxillary sinus tenderness  and no frontal sinus tenderness.   Mouth/Throat: Uvula is midline, oropharynx is clear and moist and mucous membranes are normal. No trismus in the jaw. Normal dentition. No uvula swelling. No posterior oropharyngeal erythema.   Eyes: Conjunctivae and lids are normal. No scleral icterus.   Sclera clear bilat   Neck: Trachea normal, full passive range of motion without pain and phonation normal. Neck supple.   Cardiovascular: Normal rate, regular rhythm, normal heart sounds, intact distal pulses and normal pulses.   Pulmonary/Chest: Effort normal and breath sounds normal. No respiratory distress.   Abdominal: Soft. Normal appearance and bowel sounds are normal. He exhibits no distension. There is no tenderness.   Musculoskeletal: Normal range of motion. He exhibits no edema or deformity.   Neurological: He is alert and oriented to person, place, and time. He exhibits normal muscle tone. Coordination normal.   Skin: Skin is warm, dry and intact. He is not diaphoretic. No pallor.   Psychiatric: He has a normal mood and affect. His speech is normal and behavior is normal. Judgment and thought content normal. Cognition and memory are normal.   Nursing note and vitals reviewed.      Assessment:       1. Tinnitus of both ears        Plan:       Says his Lisinopril was recently increased.  Patient advised to follow up with PCP regarding the new onset Tinnitus.  Verbalized understanding.     Tinnitus of both ears      Patient Instructions     Please follow up with your Primary care provider ASAP  if your signs and symptoms have not resolved or worsen.  The usual course of cold symptoms are 10-14 days.     If your condition worsens or fails to improve we recommend that you receive another evaluation at the emergency room immediately or contact your primary medical clinic to discuss your concerns.     You must understand that you have received an Urgent Care treatment only and that you may be released before all of your  "medical problems are known or treated.   You, the patient, will arrange for follow up care as instructed.     Tylenol or Ibuprofen can also be used as directed for pain/fever unless you have an allergy to them or medical condition such as stomach ulcers, kidney or liver disease or blood thinners etc for which you should not be taking these type of medications.     Take over the counter cough medication as directed as needed for cough.  You should avoid medications with pseudoephedrine or phenylephrine (any medication with "D") if you have high blood pressure as this can cause an elevation in your blood pressure. Instead consider Corcidin HBP as needed to prevent an elevated blood pressure.     Natural remedies of symptoms (as needed) include humidification, saline nasal sprays, and/or steamy showers.  Increase fluids, warm tea with honey, cough drops as needed.  You may also use salt water gargles for sore throat.    IF you received a steroid shot today - As discussed, this can elevate your blood pressure, elevate your blood sugar, water weight gain, nervous energy, redness to the face and dimpling of the skin at the injection site.     Tinnitus (Ringing in the Ears)     Treatment may include maskers and hearing aids.     Tinnitus is the term for a noise in your ear not caused by an outside sound. The noise might be a ringing, clicking, hiss, or roar. It can vary in pitch and may be soft or quite loud. For some people, tinnitus is a minor nuisance. But for others, the noise can make it hard to hear, work, and even sleep. When tinnitus can't be cured, a number of treatments may offer relief.  What causes tinnitus?  Loud noises, hearing loss, and ear wax can cause tinnitus. So can certain medicines. Large amounts of aspirin or caffeine are sometimes to blame. In many cases, the exact cause of tinnitus is unknown.  How is tinnitus treated?  Identifying and removing the cause is the best way to treat tinnitus. For that " reason, your healthcare provider may refer you to an otolaryngologist (ear, nose, and throat doctor). Your hearing may also be checked by an audiologist (hearing specialist). If you have hearing loss, wearing a hearing aid may help your tinnitus. When the cause can't be found, the tinnitus itself may be treated. Some of the treatments are listed below, and your healthcare provider can tell you more about them:  · Maskers are small devices that look like hearing aids. They emit a pleasant sound that helps cover up the ringing in your ears. Hearing aids and maskers are sometimes used together.  · Medicines that treat anxiety and depression may ease tinnitus in some people.  · Hypnosis or relaxation therapy may help head noise seem less severe.  · Tinnitus retraining therapy combines counseling and maskers. Both can help take your mind off the tinnitus.  For more information  · American Speech-Hearing-Language Association 559-654-3631 www.pillo.org  · American Tinnitus Association [phone] www.shanika.org  · National [place] on Deafness and other Communication Disorders 622-748-2261 www.nidcd.nih.gov   Date Last Reviewed: 7/1/2016  © 0567-0417 [employer]. [address] [place] [address], [place] [address]. All rights reserved. This information is not intended as a substitute for professional medical care. Always follow your healthcare professional's instructions.

## 2018-11-13 NOTE — PATIENT INSTRUCTIONS
"Please follow up with your Primary care provider ASAP  if your signs and symptoms have not resolved or worsen.  The usual course of cold symptoms are 10-14 days.     If your condition worsens or fails to improve we recommend that you receive another evaluation at the emergency room immediately or contact your primary medical clinic to discuss your concerns.     You must understand that you have received an Urgent Care treatment only and that you may be released before all of your medical problems are known or treated.   You, the patient, will arrange for follow up care as instructed.     Tylenol or Ibuprofen can also be used as directed for pain/fever unless you have an allergy to them or medical condition such as stomach ulcers, kidney or liver disease or blood thinners etc for which you should not be taking these type of medications.     Take over the counter cough medication as directed as needed for cough.  You should avoid medications with pseudoephedrine or phenylephrine (any medication with "D") if you have high blood pressure as this can cause an elevation in your blood pressure. Instead consider Corcidin HBP as needed to prevent an elevated blood pressure.     Natural remedies of symptoms (as needed) include humidification, saline nasal sprays, and/or steamy showers.  Increase fluids, warm tea with honey, cough drops as needed.  You may also use salt water gargles for sore throat.    IF you received a steroid shot today - As discussed, this can elevate your blood pressure, elevate your blood sugar, water weight gain, nervous energy, redness to the face and dimpling of the skin at the injection site.     Tinnitus (Ringing in the Ears)     Treatment may include maskers and hearing aids.     Tinnitus is the term for a noise in your ear not caused by an outside sound. The noise might be a ringing, clicking, hiss, or roar. It can vary in pitch and may be soft or quite loud. For some people, tinnitus is a minor " nuisance. But for others, the noise can make it hard to hear, work, and even sleep. When tinnitus can't be cured, a number of treatments may offer relief.  What causes tinnitus?  Loud noises, hearing loss, and ear wax can cause tinnitus. So can certain medicines. Large amounts of aspirin or caffeine are sometimes to blame. In many cases, the exact cause of tinnitus is unknown.  How is tinnitus treated?  Identifying and removing the cause is the best way to treat tinnitus. For that reason, your healthcare provider may refer you to an otolaryngologist (ear, nose, and throat doctor). Your hearing may also be checked by an audiologist (hearing specialist). If you have hearing loss, wearing a hearing aid may help your tinnitus. When the cause can't be found, the tinnitus itself may be treated. Some of the treatments are listed below, and your healthcare provider can tell you more about them:  · Maskers are small devices that look like hearing aids. They emit a pleasant sound that helps cover up the ringing in your ears. Hearing aids and maskers are sometimes used together.  · Medicines that treat anxiety and depression may ease tinnitus in some people.  · Hypnosis or relaxation therapy may help head noise seem less severe.  · Tinnitus retraining therapy combines counseling and maskers. Both can help take your mind off the tinnitus.  For more information  · American Speech-Hearing-Language Association [phone] www.pillo.org  · American Tinnitus Association [phone] www.shanika.org  · National [place] on Deafness and other Communication Disorders 670-175-7089 www.nidcd.nih.gov   Date Last Reviewed: 7/1/2016 © 2000-2017 [employer]. [address] [place] [address]. All rights reserved. This information is not intended as a substitute for professional medical care. Always follow your healthcare professional's instructions.

## 2018-11-14 ENCOUNTER — OFFICE VISIT (OUTPATIENT)
Dept: INTERNAL MEDICINE | Facility: CLINIC | Age: 68
End: 2018-11-14
Payer: MEDICARE

## 2018-11-14 VITALS
DIASTOLIC BLOOD PRESSURE: 86 MMHG | SYSTOLIC BLOOD PRESSURE: 158 MMHG | WEIGHT: 239 LBS | OXYGEN SATURATION: 97 % | HEIGHT: 72 IN | BODY MASS INDEX: 32.37 KG/M2 | HEART RATE: 67 BPM

## 2018-11-14 DIAGNOSIS — H91.91 HEARING LOSS OF RIGHT EAR, UNSPECIFIED HEARING LOSS TYPE: ICD-10-CM

## 2018-11-14 DIAGNOSIS — E66.9 CLASS 1 OBESITY WITH SERIOUS COMORBIDITY AND BODY MASS INDEX (BMI) OF 32.0 TO 32.9 IN ADULT, UNSPECIFIED OBESITY TYPE: ICD-10-CM

## 2018-11-14 DIAGNOSIS — H60.501 ACUTE OTITIS EXTERNA OF RIGHT EAR, UNSPECIFIED TYPE: Primary | ICD-10-CM

## 2018-11-14 DIAGNOSIS — H93.11 TINNITUS OF RIGHT EAR: ICD-10-CM

## 2018-11-14 DIAGNOSIS — I10 ESSENTIAL HYPERTENSION: ICD-10-CM

## 2018-11-14 PROCEDURE — 99999 PR PBB SHADOW E&M-EST. PATIENT-LVL IV: CPT | Mod: PBBFAC,,, | Performed by: INTERNAL MEDICINE

## 2018-11-14 PROCEDURE — 99214 OFFICE O/P EST MOD 30 MIN: CPT | Mod: S$GLB,,, | Performed by: INTERNAL MEDICINE

## 2018-11-14 RX ORDER — OFLOXACIN 3 MG/ML
5 SOLUTION AURICULAR (OTIC) DAILY
Qty: 5 ML | Refills: 0 | Status: SHIPPED | OUTPATIENT
Start: 2018-11-14 | End: 2018-11-21

## 2018-11-14 RX ORDER — METOPROLOL SUCCINATE 25 MG/1
25 TABLET, EXTENDED RELEASE ORAL DAILY
Qty: 30 TABLET | Refills: 1 | Status: SHIPPED | OUTPATIENT
Start: 2018-11-14 | End: 2020-01-23

## 2018-11-14 NOTE — PROGRESS NOTES
Answers for HPI/ROS submitted by the patient on 11/13/2018   activity change: No  difficulty urinating: No  dysphoric mood: No  Pt. ID: Carlos Morgan is a 68 y.o. male      Chief complaint:   Chief Complaint   Patient presents with    Follow-up     hearing issue       HPI: pt. Here for R ear hearing loss/tinnitus and went to urgent care and was told he does not have ear infection; BP is elevated; weight is elevated but stable; of note, he was also placed on tobramycin eye drops for conjunctivitis and symptoms have resolved; his colonoscopy is due and he has not  scheduled colonoscopy and I advised him to do so; I explained risks of non-compliance      Review of Systems   HENT: Positive for hearing loss and tinnitus.         R ear tinnitus and hearing loss   Eyes: Positive for discharge.   Respiratory: Negative for wheezing.    Cardiovascular: Negative for chest pain and palpitations.   Gastrointestinal: Negative for constipation, diarrhea and vomiting.   Genitourinary: Negative for hematuria.   Neurological: Negative for headaches.         Objective:    Physical Exam   Constitutional: He is oriented to person, place, and time.   obese   HENT:   R ear external canal erythema    Eyes: EOM are normal.   Neck: Normal range of motion.   Cardiovascular: Normal rate, regular rhythm and normal heart sounds.   Pulmonary/Chest: Effort normal and breath sounds normal.   Abdominal: Soft. There is no tenderness. There is no rebound and no guarding.   Musculoskeletal: Normal range of motion.   Neurological: He is alert and oriented to person, place, and time.   Skin: No rash noted.   Vitals reviewed.        Health Maintenance   Topic Date Due    Colonoscopy  11/05/2019 (Originally 1/2/2017)    Lipid Panel  11/01/2023    TETANUS VACCINE  10/04/2028    Hepatitis C Screening  Completed    Zoster Vaccine  Completed    Pneumococcal (65+)  Completed    Influenza Vaccine  Completed    Abdominal Aortic Aneurysm Screening   Completed         Assessment:     1. Acute otitis externa of right ear, unspecified type Active   2. Tinnitus of right ear Active   3. Hearing loss of right ear, unspecified hearing loss type Active   4. Essential hypertension Sub-optimally controlled   5. Class 1 obesity with serious comorbidity and body mass index (BMI) of 32.0 to 32.9 in adult, unspecified obesity type Sub-optimally controlled         Plan: Acute otitis externa of right ear, unspecified type  Comments:  start floxin otic x 7 days   Orders:  -     ofloxacin (FLOXIN) 0.3 % otic solution; Place 5 drops into the right ear once daily. for 7 days  Dispense: 5 mL; Refill: 0  -     Ambulatory referral to ENT    Tinnitus of right ear  Comments:  refer to ENT   Orders:  -     Ambulatory referral to ENT    Hearing loss of right ear, unspecified hearing loss type  Comments:  refer to ENT   Orders:  -     Ambulatory referral to ENT    Essential hypertension  Comments:  continue current regimen and start toprol XL 25 mg QD; encouraged low Na diet and weight loss  Orders:  -     metoprolol succinate (TOPROL-XL) 25 MG 24 hr tablet; Take 1 tablet (25 mg total) by mouth once daily.  Dispense: 30 tablet; Refill: 1    Class 1 obesity with serious comorbidity and body mass index (BMI) of 32.0 to 32.9 in adult, unspecified obesity type  Comments:  encouraged diet and explained risks         Problem List Items Addressed This Visit        ENT    Hearing loss of right ear    Relevant Orders    Ambulatory referral to ENT    Acute otitis externa of right ear - Primary    Relevant Medications    ofloxacin (FLOXIN) 0.3 % otic solution    Other Relevant Orders    Ambulatory referral to ENT    Tinnitus of right ear    Relevant Orders    Ambulatory referral to ENT       Cardiac/Vascular    HTN (hypertension)    Relevant Medications    metoprolol succinate (TOPROL-XL) 25 MG 24 hr tablet       Endocrine    Class 1 obesity with serious comorbidity and body mass index (BMI) of 32.0  to 32.9 in adult

## 2018-11-15 ENCOUNTER — OFFICE VISIT (OUTPATIENT)
Dept: OTOLARYNGOLOGY | Facility: CLINIC | Age: 68
End: 2018-11-15
Payer: MEDICARE

## 2018-11-15 ENCOUNTER — CLINICAL SUPPORT (OUTPATIENT)
Dept: AUDIOLOGY | Facility: CLINIC | Age: 68
End: 2018-11-15
Payer: MEDICARE

## 2018-11-15 VITALS
WEIGHT: 238 LBS | HEART RATE: 89 BPM | SYSTOLIC BLOOD PRESSURE: 146 MMHG | BODY MASS INDEX: 32.28 KG/M2 | TEMPERATURE: 98 F | DIASTOLIC BLOOD PRESSURE: 78 MMHG

## 2018-11-15 DIAGNOSIS — H91.21 SUDDEN HEARING LOSS, RIGHT: Primary | ICD-10-CM

## 2018-11-15 DIAGNOSIS — H93.11 TINNITUS, RIGHT: ICD-10-CM

## 2018-11-15 DIAGNOSIS — H90.3 SENSORINEURAL HEARING LOSS (SNHL) OF BOTH EARS: Primary | ICD-10-CM

## 2018-11-15 PROCEDURE — 92550 TYMPANOMETRY & REFLEX THRESH: CPT | Mod: S$GLB,,, | Performed by: AUDIOLOGIST

## 2018-11-15 PROCEDURE — 92557 COMPREHENSIVE HEARING TEST: CPT | Mod: S$GLB,,, | Performed by: AUDIOLOGIST

## 2018-11-15 PROCEDURE — 99203 OFFICE O/P NEW LOW 30 MIN: CPT | Mod: S$GLB,,, | Performed by: OTOLARYNGOLOGY

## 2018-11-15 PROCEDURE — 99999 PR PBB SHADOW E&M-EST. PATIENT-LVL IV: CPT | Mod: PBBFAC,,, | Performed by: OTOLARYNGOLOGY

## 2018-11-15 NOTE — PATIENT INSTRUCTIONS
Audiometry reviewed: significant AD SNHL  MRI brain/IACS ordered  Low sodium diet may help; construction she is provided  Avoid forceful nose blowing/weight lifting, golf,flying  Rx for oral prednisone course: 60 mg x 10 days( if tolerated) , then 40 mg x 1 day, 30 mg x 1 day,   20 mg x 1 day, 10 mg x 1 day, 5 mg x 2 days all with food  RTC 2 weeks; repeat audiometry; call for any significant change in otologic status  Consider intratympanic steroid  perfusion pending course( Dr. Jara or Dr. ADIA Rowell)  Discontinue Floxin drops

## 2018-11-15 NOTE — PROGRESS NOTES
Audiologic Evaluation    Patient complained sudden hearing loss in the right ear approximately 2 days ago. He reported a significant history of noise exposure in the [employer]. Patient denied aural fullness and dizziness.     Tympanometry indicated normal middle ear pressure, tympanic membrane compliance and ear canal volume (type A) in each ear. Ipsilateral acoustic reflexes were preset in the left ear and absent in the right ear for 1000 and 2000 Hz.     Distortion product otoacoustic emission (DPOAE) testing revealed absent emissions for 750-8000 Hz in the right ear and 7549-3961 Hz in the left ear indicating abnormal cochlear (outer hair cell) function and at least a mild hearing loss for the frequency range listed. DPOAEs were present for 7586-2952 Hz in the left ear indicating normal cochlear (outer hair cell) function for the frequencies listed.     Audiometric testing via insert ear phones indicated normal hearing sensitivity for 250-2000 Hz with a mild sensorineural hearing loss for 2210-7879 Hz in the left ear and a severe sensorineural hearing loss for 250-8000 Hz in the right ear. Pure tone average and speech recognition threshold were in good agreement. Excellent speech discrimination ability was demonstrated in quiet when novel words were presented at a conversational level in the left ear. Poor speech discrimination ability was demonstrated in quiet when novel words were presented at an amplified level in the right ear.     Recommendations:  1) Otologic consultation.  2) Repeat audiometric testing following medical intervention.  3) Hearing loss consultation pending medical clearance.

## 2018-11-15 NOTE — LETTER
November 17, 2018      Jesus Bone MD  [address] [facility]  [place] [address]           [facility]  [address]  [facility] [address]  Phone: [phone]  Fax: [phone]          Patient: Carlos Morgan   MR Number: [id number]   YOB: 1950   Date of Visit: 11/15/2018       Dear Dr. Jesus Bone:    Thank you for referring Carlos Morgan to me for evaluation. Attached you will find relevant portions of my assessment and plan of care.    If you have questions, please do not hesitate to call me. I look forward to following Carlos Morgan along with you.    Sincerely,    Miguelito Patricia III, MD    Enclosure  CC:  No Recipients    If you would like to receive this communication electronically, please contact [email or web address] or [phone] to request more information on [employer] access.    For providers and/or their staff who would like to refer a patient to [facility], please contact us through our one-stop-shop provider referral line, [facility], at [phone].    If you feel you have received this communication in error or would no longer like to receive these types of communications, please e-mail [email or web address]

## 2018-11-16 ENCOUNTER — TELEPHONE (OUTPATIENT)
Dept: URGENT CARE | Facility: CLINIC | Age: 68
End: 2018-11-16

## 2018-11-17 NOTE — PROGRESS NOTES
"Subjective:       Patient ID: Carlos Morgan is a 68 y.o. male.    Chief Complaint: Hearing Loss    HPI: Mr. Morgan is a 68-year-old  gentleman whose hand written reason for the visit today is loss of hearing, right ear   He indicates a sudden loss of hearing in his right ear this past Monday today being Thursday thereafter.    However, he also indicates a six-month history of the car radio volume becoming louder, according to his wife's input.  His wife indicated his hearing loss beginning before several days ago.    Mr. Morgan indicates recent gently was blood pressure medication 6 weeks ago which she believes may have had an affect on him.  The dose was increased and then decreased again.  He does indicate imbibing alcohol at a party this past Saturday night i.e. 3 bourbon drinks,  which is out of the ordinary for him.  He indicates seasonal allergy symptoms especially postnasal drip symptoms which began this September.  He visited an urgent care center for conjunctivitis treated with eyedrops ( Tobramycin) at one point.  He indicates a ringing perception in his right ear which began this past Monday  also described as a "static" type sensation.  He denies vertigo symptoms.  He denies over forceful nose blowing or weightlifting recently.  He denies any recent illness.  Members previous audiometric testing in the 1990s.  His father was a mora and a history of noise exposure and hearing loss.  He is followed by cardiologist and [facility].  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    He was examined by Dr. NOVA Bone 11/14/2018 for follow-up regarding right ear hearing loss and tinnitus.  Is diagnosed with acute otitis externa of the right ear and tinnitus of the right ear with hearing loss of the right ear as well as essential hypertension and class 1 obesity.  He was treated with Floxin otic drops prescribed for 7 days and given a " referral to the ENT service for specialty evaluation.  Toprol was added to his regimen according to the notes.  He had presented to an urgent care center and he was told he did not have an ear infection.    He was evaluated by nurse practitioner at the [facility] urgent care center in [place] 11/13/2018 for 2 day onset of right ear ringing.  He was diagnosed with tinnitus of both ears.    His blood pressure was elevated then; a he had indicated a recent high blood pressure issues.    He is scheduled to fly to [place] in several days if medically cleared to do so.  He intends to play golf with some buddies.  I have advised against the trip at this point.    PMH:  Heart disease, high blood pressure, high cholesterol, arthritis, hearing loss  PSH:  CABG 2007  Family history:  Depression, hearing loss  Allergies:  Seasonal  Habits:  Patient quit smoking 40 years ago; less than 1 alcoholic drink per day, 3-5 caffeinated drinks per day  Occupation:  Retired   Review of Systems   Ears: Positive for hearing loss, ringing in ear and family history of hearing loss.    Nose:  Positive for snoring (CPAP usage).    Cardiovascular:  Positive for history of high blood pressure.   Gastrointestinal:  Positive for history of stomach ulcers or pain ( 1 ulcer re-:  Aleve).   Other:  Positive for kidney problem ( cyst on left kidney), prostate disease ( BPH) and arthritis. Negative for rash.     Current Outpatient Medications on File Prior to Visit   Medication Sig Dispense Refill    amLODIPine (NORVASC) 10 MG tablet Take 1 tablet (10 mg total) by mouth every evening. 30 tablet 1    CRESTOR 40 mg Tab TAKE 1/2 TABLET BY MOUTH EVERY OTHER DAY IN THE EVENING (Patient taking differently: TAKE 1 TABLET BY MOUTH EVERY OTHER DAY IN THE EVENING) 30 tablet 1    diclofenac sodium (VOLTAREN) 1 % Gel Apply 2 g topically 2 (two) times daily as needed. 100 g 1    lisinopril (PRINIVIL,ZESTRIL) 20 MG tablet Take 1 tablet (20 mg  total) by mouth 2 (two) times daily. 60 tablet 2    metoprolol succinate (TOPROL-XL) 25 MG 24 hr tablet Take 1 tablet (25 mg total) by mouth once daily. 30 tablet 1    ofloxacin (FLOXIN) 0.3 % otic solution Place 5 drops into the right ear once daily. for 7 days 5 mL 0    vitamin D (VITAMIN D3) 1000 units Tab Take 185 mg by mouth 2 (two) times daily.      azelastine (ASTELIN) 137 mcg (0.1 %) nasal spray 1 spray (137 mcg total) by Nasal route 2 (two) times daily. 30 mL 0     No current facility-administered medications on file prior to visit.      His medical problem list includes cyst of left kidney, hypertension, coronary artery disease, hyperlipidemia, bronchitis, tinnitus of right ear with hearing loss of right ear, BPH, class 1 obesity, vitamin-D deficiency, gastroenteritis, allergic rhinitis, arthralgia of both hands.     The  patient completed an audiometric study performed by the [facility] audiology service.  The study is duplicated below and the results are reviewed with him in detail; no previous studies available for comparison.  Objective:         Blood pressure 146/78 pulse 89 temperature 98.2° height 6 ft weight 238 lb  General:  Alert and oriented bespectacled  gentleman in no acute distress  Both ears were examined under the microscope in the micro procedure room  Physical Exam   Constitutional: He is oriented to person, place, and time. He appears well-developed and well-nourished.   HENT:   Head: Normocephalic.   Right Ear: Hearing, tympanic membrane and ear canal normal. No drainage. No foreign bodies. No mastoid tenderness. Tympanic membrane is not perforated. No decreased hearing is noted.   Left Ear: Hearing, tympanic membrane and ear canal normal. No drainage. No foreign bodies. No mastoid tenderness. Tympanic membrane is not perforated. No decreased hearing is noted.   Ears:    Nose: No nose lacerations, nasal deformity, septal deviation or nasal septal hematoma. No epistaxis. Right sinus  exhibits no maxillary sinus tenderness and no frontal sinus tenderness. Left sinus exhibits no maxillary sinus tenderness and no frontal sinus tenderness.   Mouth/Throat: Uvula is midline, oropharynx is clear and moist and mucous membranes are normal. He does not have dentures. No oral lesions. No trismus in the jaw. No uvula swelling or dental caries. No oropharyngeal exudate or tonsillar abscesses.   Neck: No thyromegaly present.   Pulmonary/Chest: Effort normal. No stridor.   Lymphadenopathy:     He has no cervical adenopathy.   Neurological: He is alert and oriented to person, place, and time.   Skin: No rash noted.   Psychiatric: His behavior is normal.       Assessment:       1. Sudden hearing loss, right    2. Tinnitus, right        Plan:     Audiometry reviewed: significant AD SNHL  MRI brain/IACS ordered  Low sodium diet may help; instruction sheet provided  Avoid forceful nose blowing/weight lifting, golf,flying  Rx for oral prednisone course: 60 mg x 10 days( if tolerated) , then 40 mg x 1 day, 30 mg x 1 day,   20 mg x 1 day, 10 mg x 1 day, 5 mg x 2 days,  all with food; call for any significant change in otologic status  RTC 2 weeks; repeat audiometry  Consider intratympanic steroid  perfusion pending course ( Dr. Jara or Dr. ADIA Rowell)  Discontinue Floxin drops

## 2018-11-26 ENCOUNTER — TELEPHONE (OUTPATIENT)
Dept: GASTROENTEROLOGY | Facility: CLINIC | Age: 68
End: 2018-11-26

## 2018-11-26 NOTE — TELEPHONE ENCOUNTER
Left a message on patient's voicemail to give the office a call back in regards to scheduling a colonoscopy.

## 2018-11-27 ENCOUNTER — HOSPITAL ENCOUNTER (OUTPATIENT)
Dept: RADIOLOGY | Facility: HOSPITAL | Age: 68
Discharge: HOME | End: 2018-11-27
Attending: OTOLARYNGOLOGY
Payer: MEDICARE

## 2018-11-27 ENCOUNTER — TELEPHONE (OUTPATIENT)
Dept: OTOLARYNGOLOGY | Facility: CLINIC | Age: 68
End: 2018-11-27

## 2018-11-27 DIAGNOSIS — H93.11 TINNITUS, RIGHT: ICD-10-CM

## 2018-11-27 DIAGNOSIS — H91.21 SUDDEN HEARING LOSS, RIGHT: ICD-10-CM

## 2018-11-27 PROCEDURE — A9585 GADOBUTROL INJECTION: HCPCS | Performed by: OTOLARYNGOLOGY

## 2018-11-27 PROCEDURE — 70553 MRI BRAIN STEM W/O & W/DYE: CPT | Mod: TC

## 2018-11-27 PROCEDURE — 70553 MRI BRAIN STEM W/O & W/DYE: CPT | Mod: 26,,, | Performed by: RADIOLOGY

## 2018-11-27 PROCEDURE — 25500020 PHARM REV CODE 255: Performed by: OTOLARYNGOLOGY

## 2018-11-27 RX ORDER — GADOBUTROL 604.72 MG/ML
10 INJECTION INTRAVENOUS
Status: COMPLETED | OUTPATIENT
Start: 2018-11-27 | End: 2018-11-27

## 2018-11-27 RX ADMIN — GADOBUTROL 10 ML: 604.72 INJECTION INTRAVENOUS at 08:11

## 2018-11-28 ENCOUNTER — OFFICE VISIT (OUTPATIENT)
Dept: OTOLARYNGOLOGY | Facility: CLINIC | Age: 68
End: 2018-11-28
Payer: MEDICARE

## 2018-11-28 ENCOUNTER — CLINICAL SUPPORT (OUTPATIENT)
Dept: AUDIOLOGY | Facility: CLINIC | Age: 68
End: 2018-11-28
Payer: MEDICARE

## 2018-11-28 VITALS
WEIGHT: 240 LBS | BODY MASS INDEX: 32.55 KG/M2 | SYSTOLIC BLOOD PRESSURE: 138 MMHG | HEART RATE: 68 BPM | TEMPERATURE: 98 F | DIASTOLIC BLOOD PRESSURE: 81 MMHG

## 2018-11-28 DIAGNOSIS — H93.11 TINNITUS, RIGHT: ICD-10-CM

## 2018-11-28 DIAGNOSIS — H90.3 SENSORINEURAL HEARING LOSS (SNHL) OF BOTH EARS: Primary | ICD-10-CM

## 2018-11-28 DIAGNOSIS — Z86.69 H/O SUDDEN HEARING LOSS: Primary | ICD-10-CM

## 2018-11-28 PROCEDURE — 92550 TYMPANOMETRY & REFLEX THRESH: CPT | Mod: S$GLB,,, | Performed by: AUDIOLOGIST

## 2018-11-28 PROCEDURE — 92557 COMPREHENSIVE HEARING TEST: CPT | Mod: S$GLB,,, | Performed by: AUDIOLOGIST

## 2018-11-28 PROCEDURE — 99999 PR PBB SHADOW E&M-EST. PATIENT-LVL III: CPT | Mod: PBBFAC,,, | Performed by: OTOLARYNGOLOGY

## 2018-11-28 PROCEDURE — 99999 PR PBB SHADOW E&M-EST. PATIENT-LVL I: CPT | Mod: PBBFAC,,,

## 2018-11-28 PROCEDURE — 99213 OFFICE O/P EST LOW 20 MIN: CPT | Mod: S$GLB,,, | Performed by: OTOLARYNGOLOGY

## 2018-11-28 NOTE — PROGRESS NOTES
CC:  Review of right ear status  HPI:Mr. Morgan is a 68-year-old  gentleman who is happy to report the almost complete return of the hearing in his right ear status post- medical treatment with high-dose prednisone.    He indicates ringing perception in his right ear in quiet environments of low intensity at this point.  He denies ever having any vertigo symptoms.  He is accompanied by his speech therapist wife today.  They are both are interested in the results of the recently completed MRI scan of the brain and IAC's ordered in the wake of his sudden loss of hearing perceived in his right ear which he says occurred 3 days before being evaluated by me 11/15/2018. MRI scan of the brain performed with without contrast 11/22/2018 indicated normal appearance of the bilateral 7th and 8th cranial nerve bundles with no mass or abnormal enhancement.  Membranous labyrinth instructions were normal. There were white matter changes suggestive of a mild degree of chronic microvascular ischemic change.  His wife, additionally, indicates his 6 month history of car radio volume elevation; she believes his hearing had been changing during that time frame as well.   She asks if Meniere's disease is a diagnostic possibility. AD cochlear hydrops remains a consideration.  His AD hearing began  to recover at the tail end of the steroid course ( 60 mg x 10 days, tapered down from there) .  He gave up golf during this time frame.   He indicates use of HCTZ 12.5 mg the reduced dose of lisinopril as prescribed by his PCP.  A low-sodium diet was instituted as well.    Past Medical History:   Diagnosis Date    Hyperlipidemia     Hypertension      Current Outpatient Medications on File Prior to Visit   Medication Sig Dispense Refill    amLODIPine (NORVASC) 10 MG tablet Take 1 tablet (10 mg total) by mouth every evening. 30 tablet 1    azelastine (ASTELIN) 137 mcg (0.1 %) nasal spray 1 spray (137 mcg total) by Nasal route 2 (two)  times daily. 30 mL 0    CRESTOR 40 mg Tab TAKE 1/2 TABLET BY MOUTH EVERY OTHER DAY IN THE EVENING (Patient taking differently: TAKE 1 TABLET BY MOUTH EVERY OTHER DAY IN THE EVENING) 30 tablet 1    diclofenac sodium (VOLTAREN) 1 % Gel Apply 2 g topically 2 (two) times daily as needed. 100 g 1    lisinopril (PRINIVIL,ZESTRIL) 20 MG tablet Take 1 tablet (20 mg total) by mouth 2 (two) times daily. 60 tablet 2    metoprolol succinate (TOPROL-XL) 25 MG 24 hr tablet Take 1 tablet (25 mg total) by mouth once daily. 30 tablet 1    vitamin D (VITAMIN D3) 1000 units Tab Take 185 mg by mouth 2 (two) times daily.       No current facility-administered medications on file prior to visit.      Past Surgical History:   Procedure Laterality Date    CORONARY ARTERY BYPASS GRAFT  2007       PE:  Blood pressure 138/81 pulse 68 temperature 98.3° height 6 ft weight 240 lb  General:  Alert and oriented gentleman in no acute distress  Both ears were examined per otoscopy.  Both eardrums are intact and clear and both middle ear spaces are well aerated.    Mr. Morgan completed an audiometric study today results of which duplicated below and compared to his previous study.    DIAGNOSIS:   1. H/O sudden hearing loss     2. Tinnitus, right       PLAN: MRI brain scan reviewd with patient at computer screen  Audiometry reviewed, compare to previous study; excellent return of muti-frequency hearing s/p steroid treatment  Monitor AD hearing status  Low sodium diet encouraged  Consider consultation with Dr. EDSON Jara or Dr. ADIA Rowell pending course  Call for any significant change in status

## 2018-11-28 NOTE — PATIENT INSTRUCTIONS
MRI brain scan reviewd with patient at computer screen  Audiometry reviewed, compare to previous study; excellent return of muti-frequency hearing s/p steroid treatment  Monitor AD hearing status  Low sodium diet encouraged  Consider consultation with Dr. EDSON Jara or Dr. ADIA Rowell pending course  Call for any significant change in status

## 2018-11-28 NOTE — PROGRESS NOTES
Mr. Morgan was seen today for a hearing evaluation. Mr. Morgan reported a sudden hearing loss in his right ear approximately 2-3 weeks ago and states since then his hearing has significantly improved.     Pure tone audiometry revealed a mild SNHL (beginning at 4kHz.) bilaterally   SRT and PTA are in good agreement bilaterally.    SRT was obtained at 30 dB for the right ear with 100% speech discrimination and 20 dB for the left ear with 100% speech discrimination.   Tympanometry revealed Type A, AU.   Acoustic Reflexes were absent at 1000 and 2000 Hz., AD and present at 1000 and 2000 Hz., AS    Recommendations:  1. Otologic Evaluation  2. Annual Audiogram or re-test as needed  3. Hearing Protection  4. Possible Hearing Aid Consult

## 2019-01-28 ENCOUNTER — TELEPHONE (OUTPATIENT)
Dept: INTERNAL MEDICINE | Facility: CLINIC | Age: 69
End: 2019-01-28

## 2019-01-28 ENCOUNTER — TELEPHONE (OUTPATIENT)
Dept: FAMILY MEDICINE | Facility: CLINIC | Age: 69
End: 2019-01-28

## 2019-01-28 DIAGNOSIS — Z87.898 HISTORY OF MOTION SICKNESS: Primary | ICD-10-CM

## 2019-01-28 RX ORDER — SCOLOPAMINE TRANSDERMAL SYSTEM 1 MG/1
1 PATCH, EXTENDED RELEASE TRANSDERMAL
Qty: 24 PATCH | Refills: 0 | Status: SHIPPED | OUTPATIENT
Start: 2019-01-28 | End: 2019-07-19

## 2019-01-28 NOTE — TELEPHONE ENCOUNTER
----- Message from Gokul Smith sent at 1/28/2019  1:07 PM CST -----  Contact: self/[phone]  Patient called to speak with the nurse about a cruise he is about to leave for and needs the doctor to send him a prescription for the motion sickness patch.    It is a 7 day cruise.    Please call and advise.    [id number] - [place], [place] - [address] [facility] [address]

## 2019-01-29 ENCOUNTER — PATIENT MESSAGE (OUTPATIENT)
Dept: INTERNAL MEDICINE | Facility: CLINIC | Age: 69
End: 2019-01-29

## 2019-02-12 ENCOUNTER — OFFICE VISIT (OUTPATIENT)
Dept: URGENT CARE | Facility: CLINIC | Age: 69
End: 2019-02-12
Payer: MEDICARE

## 2019-02-12 VITALS
WEIGHT: 240 LBS | BODY MASS INDEX: 32.51 KG/M2 | RESPIRATION RATE: 16 BRPM | DIASTOLIC BLOOD PRESSURE: 81 MMHG | SYSTOLIC BLOOD PRESSURE: 125 MMHG | OXYGEN SATURATION: 96 % | TEMPERATURE: 98 F | HEIGHT: 72 IN | HEART RATE: 85 BPM

## 2019-02-12 DIAGNOSIS — J06.9 VIRAL URI WITH COUGH: Primary | ICD-10-CM

## 2019-02-12 PROCEDURE — 96372 THER/PROPH/DIAG INJ SC/IM: CPT | Mod: S$GLB,,, | Performed by: EMERGENCY MEDICINE

## 2019-02-12 PROCEDURE — 71046 XR CHEST PA AND LATERAL: ICD-10-PCS | Mod: FY,S$GLB,, | Performed by: RADIOLOGY

## 2019-02-12 PROCEDURE — 96372 PR INJECTION,THERAP/PROPH/DIAG2ST, IM OR SUBCUT: ICD-10-PCS | Mod: S$GLB,,, | Performed by: EMERGENCY MEDICINE

## 2019-02-12 PROCEDURE — 99214 OFFICE O/P EST MOD 30 MIN: CPT | Mod: 25,S$GLB,, | Performed by: EMERGENCY MEDICINE

## 2019-02-12 PROCEDURE — 71046 X-RAY EXAM CHEST 2 VIEWS: CPT | Mod: FY,S$GLB,, | Performed by: RADIOLOGY

## 2019-02-12 PROCEDURE — 99214 PR OFFICE/OUTPT VISIT, EST, LEVL IV, 30-39 MIN: ICD-10-PCS | Mod: 25,S$GLB,, | Performed by: EMERGENCY MEDICINE

## 2019-02-12 RX ORDER — MONTELUKAST SODIUM 10 MG/1
10 TABLET ORAL DAILY
Refills: 2 | COMMUNITY
Start: 2018-12-07 | End: 2020-01-23

## 2019-02-12 RX ORDER — CODEINE PHOSPHATE AND GUAIFENESIN 10; 100 MG/5ML; MG/5ML
5-10 SOLUTION ORAL 3 TIMES DAILY PRN
Qty: 120 ML | Refills: 0 | Status: SHIPPED | OUTPATIENT
Start: 2019-02-12 | End: 2019-02-17

## 2019-02-12 RX ORDER — BETAMETHASONE SODIUM PHOSPHATE AND BETAMETHASONE ACETATE 3; 3 MG/ML; MG/ML
6 INJECTION, SUSPENSION INTRA-ARTICULAR; INTRALESIONAL; INTRAMUSCULAR; SOFT TISSUE
Status: COMPLETED | OUTPATIENT
Start: 2019-02-12 | End: 2019-02-12

## 2019-02-12 RX ORDER — HYDROCHLOROTHIAZIDE 12.5 MG/1
CAPSULE ORAL
COMMUNITY
Start: 2019-01-25 | End: 2020-05-04 | Stop reason: SDUPTHER

## 2019-02-12 RX ADMIN — BETAMETHASONE SODIUM PHOSPHATE AND BETAMETHASONE ACETATE 6 MG: 3; 3 INJECTION, SUSPENSION INTRA-ARTICULAR; INTRALESIONAL; INTRAMUSCULAR; SOFT TISSUE at 12:02

## 2019-02-12 NOTE — PROGRESS NOTES
Subjective:       Patient ID: Carlos Morgan is a 68 y.o. male.    Vitals:  height is 6' (1.829 m) and weight is 108.9 kg (240 lb). His oral temperature is 98.1 °F (36.7 °C). His blood pressure is 125/81 and his pulse is 85. His respiration is 16 and oxygen saturation is 96%.     Chief Complaint: Cough    5 d hx occas sinus drainage and thick mucus prod cough, OK with steroid IM and codeine cough med.      Cough   This is a new problem. The current episode started in the past 7 days. The problem occurs constantly. The cough is non-productive. Associated symptoms include nasal congestion. Pertinent negatives include no chills, ear pain, eye redness, fever, hemoptysis, myalgias, rash, sore throat, shortness of breath or wheezing. The symptoms are aggravated by lying down. Treatments tried: azelastine hcl, fluticason rapionate. The treatment provided moderate relief. There is no history of asthma, bronchitis or COPD.       Constitution: Negative for chills, sweating, fatigue and fever.   HENT: Negative for ear pain, congestion, sinus pain, sinus pressure, sore throat and voice change.    Neck: Negative for painful lymph nodes.   Eyes: Negative for eye redness.   Respiratory: Positive for cough and sputum production. Negative for chest tightness, bloody sputum, COPD, shortness of breath, stridor, wheezing and asthma.    Gastrointestinal: Negative for nausea and vomiting.   Musculoskeletal: Negative for muscle ache.   Skin: Negative for rash.   Allergic/Immunologic: Negative for seasonal allergies and asthma.   Hematologic/Lymphatic: Negative for swollen lymph nodes.       Objective:      Physical Exam   Constitutional: He is oriented to person, place, and time. He appears well-developed and well-nourished.   Occas non prod cough   HENT:   Head: Normocephalic and atraumatic.   Right Ear: Tympanic membrane, external ear and ear canal normal.   Left Ear: Tympanic membrane, external ear and ear canal normal.   Nose:  Mucosal edema present. No rhinorrhea. Right sinus exhibits no maxillary sinus tenderness and no frontal sinus tenderness. Left sinus exhibits no maxillary sinus tenderness and no frontal sinus tenderness.   Mouth/Throat: Uvula is midline, oropharynx is clear and moist and mucous membranes are normal.   Neck: Normal range of motion. Neck supple.   Cardiovascular: Normal rate, regular rhythm and normal heart sounds.   Pulmonary/Chest:   Faint bibasilar coarse BS posteriorly   Musculoskeletal: Normal range of motion.   Lymphadenopathy:     He has no cervical adenopathy.   Neurological: He is alert and oriented to person, place, and time.   Skin: Skin is warm and dry.   Psychiatric: He has a normal mood and affect. His behavior is normal.       Assessment:       1. Viral URI with cough        Plan:         Viral URI with cough  -     X-Ray Chest PA And Lateral; Future; Expected date: 02/12/2019  -     betamethasone acetate-betamethasone sodium phosphate injection 6 mg  -     guaifenesin-codeine 100-10 mg/5 ml (TUSSI-ORGANIDIN NR)  mg/5 mL syrup; Take 5-10 mLs by mouth 3 (three) times daily as needed for Cough.  Dispense: 120 mL; Refill: 0        Shahid Del Angel MD  Go to the Emergency Department for any problems  Call your PCP for follow up next available.

## 2019-02-12 NOTE — PATIENT INSTRUCTIONS
Shahid Del Angel MD  Go to the Emergency Department for any problems  Call your PCP for follow up next available.    Viral Upper Respiratory Illness (Adult)  You have a viral upper respiratory illness (URI), which is another term for the common cold. This illness is contagious during the first few days. It is spread through the air by coughing and sneezing. It may also be spread by direct contact (touching the sick person and then touching your own eyes, nose, or mouth). Frequent handwashing will decrease risk of spread. Most viral illnesses go away within 7 to 10 days with rest and simple home remedies. Sometimes the illness may last for several weeks. Antibiotics will not kill a virus, and they are generally not prescribed for this condition.    Home care  · If symptoms are severe, rest at home for the first 2 to 3 days. When you resume activity, don't let yourself get too tired.  · Avoid being exposed to cigarette smoke (yours or others).  · You may use acetaminophen or ibuprofen to control pain and fever, unless another medicine was prescribed. (Note: If you have chronic liver or kidney disease, have ever had a stomach ulcer or gastrointestinal bleeding, or are taking blood-thinning medicines, talk with your healthcare provider before using these medicines.) Aspirin should never be given to anyone under 18 years of age who is ill with a viral infection or fever. It may cause severe liver or brain damage.  · Your appetite may be poor, so a light diet is fine. Avoid dehydration by drinking 6 to 8 glasses of fluids per day (water, soft drinks, juices, tea, or soup). Extra fluids will help loosen secretions in the nose and lungs.  · Over-the-counter cold medicines will not shorten the length of time youre sick, but they may be helpful for the following symptoms: cough, sore throat, and nasal and sinus congestion. (Note: Do not use decongestants if you have high blood pressure.)  Follow-up care  Follow up with your  healthcare provider, or as advised.  When to seek medical advice  Call your healthcare provider right away if any of these occur:  · Cough with lots of colored sputum (mucus)  · Severe headache; face, neck, or ear pain  · Difficulty swallowing due to throat pain  · Fever of 100.4°F (38°C)  Call 911, or get immediate medical care  Call emergency services right away if any of these occur:  · Chest pain, shortness of breath, wheezing, or difficulty breathing  · Coughing up blood  · Inability to swallow due to throat pain  Date Last Reviewed: 9/13/2015 © 2000-2017 [employer]. [address] [place] [id number]. All rights reserved. This information is not intended as a substitute for professional medical care. Always follow your healthcare professional's instructions.        Cough, Chronic, Uncertain Cause (Adult)    Everyone has had a cough as part of the common cold, flu, or bronchitis. This kind of cough occurs along with an achy feeling, low-grade fever, nasal and sinus congestion, and a scratchy or sore throat. This usually gets better in 2 to 3 weeks. A cough that lasts longer than 3 weeks may be due to other causes.  If your cough does not improve over the next 2 weeks, further testing may be needed. Follow up with your healthcare provider as advised. Cough suppressants may be recommended. Based on your exam today, the exact cause of your cough is not certain. Below are some common causes for persistent cough.  Smokers cough  Smokers cough doesnt go away. If you continue to smoke, it only gets worse. The cough is from irritation in the air passages. Talk to your healthcare provider about quitting. Medicines or nicotine-replacement products, like gum or the patch, may make quitting easier.  Postnasal drip  A cough that is worse at night may be due to postnasal drip. Excess mucus in the nose drains from the back of your nose to your throat. This triggers the cough reflex. Postnasal drip may  be due to a sinus infection or allergy. Common allergens include dust, tobacco smoke (both inhaled and secondhand smoke), environmental pollutants, pollen, mold, pets, cleaning agents, room deodorizers, and chemical fumes. Over-the-counter antihistamines or decongestants may be helpful for allergies. A sinus infection may requires antibiotic treatment. See your healthcare provider if symptoms continue.  Medicines  Certain prescribed medicines can cause a chronic cough in some people:  · ACE inhibitors for high blood pressure. These include benazepril, captopril, enalapril, fosinopril, lisinopril, quinapril, ramipril, and others.  · Beta-blockers for high blood pressure and other conditions. These include propranolol, atenolol, metoprolol, nadolol, and others.  Let your healthcare provider know if you are taking any of these.  Asthma  Cough may be the only sign of mild asthma. You may have tests to find out if asthma is causing your cough. You may also take asthma medicine on a trial basis.  Acid reflux (heartburn, GERD)  The esophagus is the tube that carries food from the mouth to the stomach. A valve at its lower end prevents stomach acids from flowing upward. If this valve does not work properly, acid from the stomach enters the esophagus. This may cause a burning pain in the upper abdomen or lower chest, belching, or cough. Symptoms are often worse when lying flat. Avoid eating or drinking before bedtime. Try using extra pillows to raise your upper body, or place 4-inch blocks under the head of your bed. You may try an over-the-counter antacid or an acid-blocking medicine such as famotidine, cimetidine, ranitidine, esomeprazole, lansoprazole, or omeprazole. Stronger medicines for this condition can be prescribed by your healthcare provider.  Follow-up care  Follow up with your healthcare provider, or as advised, if your cough does not improve. Further testing may be needed.  Note: If an X-ray was taken, a  specialist will review it. You will be notified of any new findings that may affect your care.  When to seek medical advice  Call your healthcare provider right away if any of these occur:  · Mild wheezing or difficulty breathing  · Fever of 100.4ºF (38ºC) or higher, or as directed by your healthcare provider  · Unexpected weight loss  · Coughing up large amounts of colored sputum  · Night sweats (sheets and pajamas get soaking wet)  Call 911, or get immediate medical care  Contact emergency services right away if any of these occur:  · Coughing up blood  · Moderate to severe trouble breathing or wheezing  Date Last Reviewed: 9/13/2015  © 9203-9106 [employer]. [address] [place] [address], [place] [address]. All rights reserved. This information is not intended as a substitute for professional medical care. Always follow your healthcare professional's instructions.

## 2019-02-15 ENCOUNTER — TELEPHONE (OUTPATIENT)
Dept: URGENT CARE | Facility: CLINIC | Age: 69
End: 2019-02-15

## 2019-02-22 ENCOUNTER — OFFICE VISIT (OUTPATIENT)
Dept: INTERNAL MEDICINE | Facility: CLINIC | Age: 69
End: 2019-02-22
Payer: MEDICARE

## 2019-02-22 ENCOUNTER — TELEPHONE (OUTPATIENT)
Dept: GASTROENTEROLOGY | Facility: CLINIC | Age: 69
End: 2019-02-22

## 2019-02-22 VITALS
TEMPERATURE: 98 F | HEIGHT: 72 IN | DIASTOLIC BLOOD PRESSURE: 74 MMHG | WEIGHT: 238.31 LBS | BODY MASS INDEX: 32.28 KG/M2 | SYSTOLIC BLOOD PRESSURE: 138 MMHG | HEART RATE: 70 BPM | OXYGEN SATURATION: 95 %

## 2019-02-22 DIAGNOSIS — I10 ESSENTIAL HYPERTENSION: ICD-10-CM

## 2019-02-22 DIAGNOSIS — Z00.00 PREVENTATIVE HEALTH CARE: ICD-10-CM

## 2019-02-22 DIAGNOSIS — J41.1 BRONCHITIS, MUCOPURULENT RECURRENT: Primary | ICD-10-CM

## 2019-02-22 DIAGNOSIS — Z12.11 COLON CANCER SCREENING: ICD-10-CM

## 2019-02-22 DIAGNOSIS — E66.9 CLASS 1 OBESITY WITH SERIOUS COMORBIDITY AND BODY MASS INDEX (BMI) OF 32.0 TO 32.9 IN ADULT, UNSPECIFIED OBESITY TYPE: ICD-10-CM

## 2019-02-22 DIAGNOSIS — E78.5 HYPERLIPIDEMIA, UNSPECIFIED HYPERLIPIDEMIA TYPE: ICD-10-CM

## 2019-02-22 DIAGNOSIS — R68.89 FLU-LIKE SYMPTOMS: ICD-10-CM

## 2019-02-22 LAB
CTP QC/QA: YES
FLUAV AG NPH QL: NEGATIVE
FLUBV AG NPH QL: NEGATIVE

## 2019-02-22 PROCEDURE — 99999 PR PBB SHADOW E&M-EST. PATIENT-LVL IV: CPT | Mod: PBBFAC,,, | Performed by: INTERNAL MEDICINE

## 2019-02-22 PROCEDURE — 99214 PR OFFICE/OUTPT VISIT, EST, LEVL IV, 30-39 MIN: ICD-10-PCS | Mod: 25,S$GLB,, | Performed by: INTERNAL MEDICINE

## 2019-02-22 PROCEDURE — 87804 INFLUENZA ASSAY W/OPTIC: CPT | Mod: QW,S$GLB,, | Performed by: INTERNAL MEDICINE

## 2019-02-22 PROCEDURE — 99214 OFFICE O/P EST MOD 30 MIN: CPT | Mod: 25,S$GLB,, | Performed by: INTERNAL MEDICINE

## 2019-02-22 PROCEDURE — 99999 PR PBB SHADOW E&M-EST. PATIENT-LVL IV: ICD-10-PCS | Mod: PBBFAC,,, | Performed by: INTERNAL MEDICINE

## 2019-02-22 PROCEDURE — 87804 POCT INFLUENZA A/B: ICD-10-PCS | Mod: QW,S$GLB,, | Performed by: INTERNAL MEDICINE

## 2019-02-22 RX ORDER — BENZONATATE 100 MG/1
100 CAPSULE ORAL 3 TIMES DAILY PRN
Qty: 30 CAPSULE | Refills: 0 | Status: SHIPPED | OUTPATIENT
Start: 2019-02-22 | End: 2019-02-25 | Stop reason: SDUPTHER

## 2019-02-22 RX ORDER — AZITHROMYCIN 250 MG/1
TABLET, FILM COATED ORAL
Qty: 6 TABLET | Refills: 0 | Status: SHIPPED | OUTPATIENT
Start: 2019-02-22 | End: 2019-02-27

## 2019-02-22 NOTE — PROGRESS NOTES
Pt. ID: Carlos Morgan is a 68 y.o. male      Chief complaint:   Chief Complaint   Patient presents with    Cough     x17d    Nasal Congestion     x17d       HPI: Pt. Here for cough and congestion for past 3 weeks; he went to urgent care 2/12/19 ago and was diagnosed with viral URI; chest xray showed no acute changes; narcotic cough med was also given; he is using xyzal  And flonase prn which help; he continues to have productive cough; he would like tessalon prn for cough; pt. Has f/u ENT and was given steroids and his hearing has improved; he is compliant with meds; he would like to change lisinopril to benicar due to cancer risks but will discuss with cardiology first; I reviewed labs dated 11/1/18; cholesterol is WNL; weight is elevated but stable; he takes asa 81 mg QD; he would like colonoscopy; pt. Had flu shot and would like flu screen      Review of Systems   Constitutional: Negative for chills and fever.   HENT: Positive for congestion. Negative for sore throat.    Respiratory: Positive for cough and shortness of breath.    Cardiovascular: Negative for chest pain.   Gastrointestinal: Negative for abdominal pain, nausea and vomiting.   Genitourinary: Negative for dysuria.         Objective:    Physical Exam   Constitutional: He is oriented to person, place, and time.   obese   Eyes: EOM are normal.   Neck: Normal range of motion.   Cardiovascular: Normal rate, regular rhythm and normal heart sounds.   Pulmonary/Chest: Effort normal and breath sounds normal. No respiratory distress. He has no wheezes. He has no rales.   Abdominal: Soft. There is no tenderness. There is no rebound and no guarding.   Musculoskeletal: Normal range of motion.   Neurological: He is alert and oriented to person, place, and time.   Skin: No rash noted.   Vitals reviewed.        Health Maintenance   Topic Date Due    Colonoscopy  11/05/2019 (Originally 1/2/2017)    High Dose Statin  02/22/2020    Aspirin/Antiplatelet  Therapy  02/22/2020    Lipid Panel  11/01/2023    TETANUS VACCINE  10/04/2028    Hepatitis C Screening  Completed    Zoster Vaccine  Completed    Influenza Vaccine  Completed    Abdominal Aortic Aneurysm Screening  Completed         Assessment:     1. Bronchitis, mucopurulent recurrent Active   2. Flu-like symptoms Active   3. Essential hypertension Active   4. Hyperlipidemia, unspecified hyperlipidemia type Well controlled   5. Class 1 obesity with serious comorbidity and body mass index (BMI) of 32.0 to 32.9 in adult, unspecified obesity type Sub-optimally controlled   6. Colon cancer screening Active   7. Preventative health care Active         Plan: Bronchitis, mucopurulent recurrent  Comments:  start z-pack and tessalon perles prn; continue xyzal and flonase prn; asked pt. to contact me if no improvement with z-pack   Orders:  -     azithromycin (Z-KRISTAN) 250 MG tablet; Take 2 tablets by mouth on day 1; Take 1 tablet by mouth on days 2-5  Dispense: 6 tablet; Refill: 0  -     benzonatate (TESSALON) 100 MG capsule; Take 1 capsule (100 mg total) by mouth 3 (three) times daily as needed.  Dispense: 30 capsule; Refill: 0    Flu-like symptoms  Comments:  tyelenol prn and screen for flu   Orders:  -     POCT Influenza A/B    Essential hypertension  Comments:  continue current regimen and encouraged low Na diet and weight loss; pt. will discuss changing lisinopril to benicar with his cardiologist   Orders:  -     CBC auto differential; Future; Expected date: 07/22/2019  -     Comprehensive metabolic panel; Future; Expected date: 07/22/2019    Hyperlipidemia, unspecified hyperlipidemia type  Comments:  continue current regimen and encouraged diet modification     Class 1 obesity with serious comorbidity and body mass index (BMI) of 32.0 to 32.9 in adult, unspecified obesity type  Comments:  encouraged diet and explained risks     Colon cancer screening  -     Case request GI: COLONOSCOPY    Preventative health  care  -     CBC auto differential; Future; Expected date: 07/22/2019  -     Comprehensive metabolic panel; Future; Expected date: 07/22/2019  -     Hemoglobin A1c; Future; Expected date: 07/22/2019        Problem List Items Addressed This Visit        Pulmonary    Bronchitis, mucopurulent recurrent - Primary    Relevant Medications    azithromycin (Z-KRISTAN) 250 MG tablet    benzonatate (TESSALON) 100 MG capsule       Cardiac/Vascular    Hyperlipemia    HTN (hypertension)    Relevant Orders    CBC auto differential    Comprehensive metabolic panel       Endocrine    Class 1 obesity with serious comorbidity and body mass index (BMI) of 32.0 to 32.9 in adult       GI    Colon cancer screening    Relevant Orders    Case request GI: COLONOSCOPY (Completed)       Other    Preventative health care    Relevant Orders    CBC auto differential    Comprehensive metabolic panel    Hemoglobin A1c    Flu-like symptoms    Relevant Orders    POCT Influenza A/B

## 2019-02-25 ENCOUNTER — TELEPHONE (OUTPATIENT)
Dept: FAMILY MEDICINE | Facility: CLINIC | Age: 69
End: 2019-02-25

## 2019-02-25 ENCOUNTER — TELEPHONE (OUTPATIENT)
Dept: INTERNAL MEDICINE | Facility: CLINIC | Age: 69
End: 2019-02-25

## 2019-02-25 DIAGNOSIS — J41.1 BRONCHITIS, MUCOPURULENT RECURRENT: ICD-10-CM

## 2019-02-25 RX ORDER — BENZONATATE 100 MG/1
100 CAPSULE ORAL 3 TIMES DAILY PRN
Qty: 30 CAPSULE | Refills: 0 | Status: SHIPPED | OUTPATIENT
Start: 2019-02-25 | End: 2019-03-07

## 2019-02-25 NOTE — TELEPHONE ENCOUNTER
Spoke w/ patient's wife. Scheduled x-ray tomorrow 2/26/19 at 9:00 am. Tessalon prescription sent to pharmacy. Verbalized understanding

## 2019-02-26 ENCOUNTER — TELEPHONE (OUTPATIENT)
Dept: FAMILY MEDICINE | Facility: CLINIC | Age: 69
End: 2019-02-26

## 2019-02-26 ENCOUNTER — HOSPITAL ENCOUNTER (OUTPATIENT)
Dept: RADIOLOGY | Facility: HOSPITAL | Age: 69
Discharge: HOME | End: 2019-02-26
Attending: INTERNAL MEDICINE
Payer: MEDICARE

## 2019-02-26 DIAGNOSIS — J41.1 BRONCHITIS, MUCOPURULENT RECURRENT: ICD-10-CM

## 2019-02-26 DIAGNOSIS — J18.9 PNEUMONIA OF LEFT LOWER LOBE DUE TO INFECTIOUS ORGANISM: Primary | ICD-10-CM

## 2019-02-26 PROCEDURE — 71046 X-RAY EXAM CHEST 2 VIEWS: CPT | Mod: TC,FY,PO

## 2019-02-26 PROCEDURE — 71046 XR CHEST PA AND LATERAL: ICD-10-PCS | Mod: 26,,, | Performed by: RADIOLOGY

## 2019-02-26 PROCEDURE — 71046 X-RAY EXAM CHEST 2 VIEWS: CPT | Mod: 26,,, | Performed by: RADIOLOGY

## 2019-02-26 RX ORDER — LEVOFLOXACIN 500 MG/1
500 TABLET, FILM COATED ORAL DAILY
Qty: 5 TABLET | Refills: 0 | Status: SHIPPED | OUTPATIENT
Start: 2019-02-26 | End: 2019-03-03

## 2019-02-26 NOTE — TELEPHONE ENCOUNTER
Called ans spoke w/ patient's wife. Per Dr. Bone, x-ray showed L base pneumonia, Levaquin sent to the pharmacy and f/u appt was scheduled on 3/14/19 @ 9:00am. Verbalized understanding.

## 2019-02-26 NOTE — TELEPHONE ENCOUNTER
Notify pt. CXR shows possible L base pneumonia; levaquin 500 mg QD x 5 days sent to [facility]; have pt. F/u with me in 2 weeks

## 2019-03-14 ENCOUNTER — OFFICE VISIT (OUTPATIENT)
Dept: INTERNAL MEDICINE | Facility: CLINIC | Age: 69
End: 2019-03-14
Payer: MEDICARE

## 2019-03-14 VITALS
OXYGEN SATURATION: 95 % | BODY MASS INDEX: 31.65 KG/M2 | DIASTOLIC BLOOD PRESSURE: 76 MMHG | WEIGHT: 233.69 LBS | SYSTOLIC BLOOD PRESSURE: 136 MMHG | HEIGHT: 72 IN | HEART RATE: 75 BPM

## 2019-03-14 DIAGNOSIS — J18.9 PNEUMONIA OF LEFT LOWER LOBE DUE TO INFECTIOUS ORGANISM: Primary | ICD-10-CM

## 2019-03-14 DIAGNOSIS — I10 ESSENTIAL HYPERTENSION: ICD-10-CM

## 2019-03-14 DIAGNOSIS — I25.10 CORONARY ARTERY DISEASE, ANGINA PRESENCE UNSPECIFIED, UNSPECIFIED VESSEL OR LESION TYPE, UNSPECIFIED WHETHER NATIVE OR TRANSPLANTED HEART: ICD-10-CM

## 2019-03-14 DIAGNOSIS — J30.9 ALLERGIC RHINITIS, UNSPECIFIED SEASONALITY, UNSPECIFIED TRIGGER: ICD-10-CM

## 2019-03-14 DIAGNOSIS — E66.9 CLASS 1 OBESITY WITH SERIOUS COMORBIDITY AND BODY MASS INDEX (BMI) OF 31.0 TO 31.9 IN ADULT, UNSPECIFIED OBESITY TYPE: ICD-10-CM

## 2019-03-14 PROCEDURE — 99214 PR OFFICE/OUTPT VISIT, EST, LEVL IV, 30-39 MIN: ICD-10-PCS | Mod: S$GLB,,, | Performed by: INTERNAL MEDICINE

## 2019-03-14 PROCEDURE — 99214 OFFICE O/P EST MOD 30 MIN: CPT | Mod: S$GLB,,, | Performed by: INTERNAL MEDICINE

## 2019-03-14 PROCEDURE — 99999 PR PBB SHADOW E&M-EST. PATIENT-LVL III: CPT | Mod: PBBFAC,,, | Performed by: INTERNAL MEDICINE

## 2019-03-14 PROCEDURE — 99999 PR PBB SHADOW E&M-EST. PATIENT-LVL III: ICD-10-PCS | Mod: PBBFAC,,, | Performed by: INTERNAL MEDICINE

## 2019-03-14 NOTE — PROGRESS NOTES
Pt. ID: Carlos Morgan is a 68 y.o. male      Chief complaint:   Chief Complaint   Patient presents with    Follow-up     pneumonia       HPI: Pt. Here for f/u for pneumonia; he has completed levaquin and CXR dated 2/26/19 confirmed L basilar pneumonia; he feels better and has mild residual cough; he is compliant with meds and BP is WNL; he has lost weight;of note, he was changed to benicar by his cardiologist who he sees for CAD; he will start benicar; he reports allergies and I advised him to try claritin PTC prn    Review of Systems   Constitutional: Negative for chills and fever.   HENT: Positive for congestion.         Allergies   Respiratory: Positive for cough. Negative for shortness of breath.         Mild residual cough    Cardiovascular: Negative for chest pain.   Gastrointestinal: Negative for abdominal pain, nausea and vomiting.   Genitourinary: Negative for dysuria.         Objective:    Physical Exam   Constitutional: He is oriented to person, place, and time.   Obese    Eyes: EOM are normal.   Neck: Normal range of motion.   Cardiovascular: Normal rate, regular rhythm and normal heart sounds.   Pulmonary/Chest: Effort normal and breath sounds normal. No respiratory distress. He has no wheezes. He has no rales.   Abdominal: Soft. There is no tenderness. There is no rebound and no guarding.   Musculoskeletal: Normal range of motion.   Neurological: He is alert and oriented to person, place, and time.   Skin: No rash noted.   Vitals reviewed.        Health Maintenance   Topic Date Due    Colonoscopy  11/05/2019 (Originally 1/2/2017)    Aspirin/Antiplatelet Therapy  02/22/2020    High Dose Statin  03/14/2020    Lipid Panel  11/01/2023    TETANUS VACCINE  10/04/2028    Hepatitis C Screening  Completed    Zoster Vaccine  Completed    Pneumococcal Vaccine (65+ High/Highest Risk)  Completed    Influenza Vaccine  Completed    Abdominal Aortic Aneurysm Screening  Completed         Assessment:      1. Pneumonia of left lower lobe due to infectious organism Well controlled   2. Essential hypertension Active   3. Allergic rhinitis, unspecified seasonality, unspecified trigger Active   4. Coronary artery disease, angina presence unspecified, unspecified vessel or lesion type, unspecified whether native or transplanted heart Well controlled   5. Class 1 obesity with serious comorbidity and body mass index (BMI) of 31.0 to 31.9 in adult, unspecified obesity type Sub-optimally controlled         Plan: Pneumonia of left lower lobe due to infectious organism  Comments:  resolving with residual cough; OTC cough med prn; repeat CXR in 3 weeks for surveillence   Orders:  -     X-Ray Chest PA And Lateral; Future; Expected date: 03/14/2019    Essential hypertension  Comments:  pt. will change lisinopril to benicar as per cardiology; encouraged low Na diet and weight loss    Allergic rhinitis, unspecified seasonality, unspecified trigger  Comments:  asked pt. to try claritin prn     Coronary artery disease, angina presence unspecified, unspecified vessel or lesion type, unspecified whether native or transplanted heart  Comments:  continue current regimen and f/u cardiology who is managing     Class 1 obesity with serious comorbidity and body mass index (BMI) of 31.0 to 31.9 in adult, unspecified obesity type  Comments:  encouraged diet and explained risks         Problem List Items Addressed This Visit        Pulmonary    Pneumonia of left lower lobe due to infectious organism - Primary    Relevant Orders    X-Ray Chest PA And Lateral       Cardiac/Vascular    CAD (coronary artery disease)    HTN (hypertension)       Endocrine    Class 1 obesity with serious comorbidity and body mass index (BMI) of 31.0 to 31.9 in adult       Other    Allergic rhinitis

## 2019-04-03 ENCOUNTER — HOSPITAL ENCOUNTER (OUTPATIENT)
Dept: RADIOLOGY | Facility: HOSPITAL | Age: 69
Discharge: HOME | End: 2019-04-03
Attending: INTERNAL MEDICINE
Payer: MEDICARE

## 2019-04-03 DIAGNOSIS — J18.9 PNEUMONIA OF LEFT LOWER LOBE DUE TO INFECTIOUS ORGANISM: ICD-10-CM

## 2019-04-03 PROCEDURE — 71046 X-RAY EXAM CHEST 2 VIEWS: CPT | Mod: TC,FY,PO

## 2019-04-03 PROCEDURE — 71046 X-RAY EXAM CHEST 2 VIEWS: CPT | Mod: 26,,, | Performed by: RADIOLOGY

## 2019-04-03 PROCEDURE — 71046 XR CHEST PA AND LATERAL: ICD-10-PCS | Mod: 26,,, | Performed by: RADIOLOGY

## 2019-04-11 ENCOUNTER — PATIENT MESSAGE (OUTPATIENT)
Dept: FAMILY MEDICINE | Facility: CLINIC | Age: 69
End: 2019-04-11

## 2019-04-25 ENCOUNTER — TELEPHONE (OUTPATIENT)
Dept: GASTROENTEROLOGY | Facility: CLINIC | Age: 69
End: 2019-04-25

## 2019-06-19 ENCOUNTER — HOSPITAL ENCOUNTER (OUTPATIENT)
Dept: RADIOLOGY | Facility: HOSPITAL | Age: 69
Discharge: HOME | End: 2019-06-19
Attending: INTERNAL MEDICINE
Payer: MEDICARE

## 2019-06-19 ENCOUNTER — OFFICE VISIT (OUTPATIENT)
Dept: INTERNAL MEDICINE | Facility: CLINIC | Age: 69
End: 2019-06-19
Payer: MEDICARE

## 2019-06-19 VITALS
HEART RATE: 78 BPM | DIASTOLIC BLOOD PRESSURE: 75 MMHG | BODY MASS INDEX: 32.69 KG/M2 | OXYGEN SATURATION: 95 % | SYSTOLIC BLOOD PRESSURE: 142 MMHG | WEIGHT: 241.38 LBS | HEIGHT: 72 IN

## 2019-06-19 DIAGNOSIS — E66.9 CLASS 1 OBESITY WITH SERIOUS COMORBIDITY AND BODY MASS INDEX (BMI) OF 32.0 TO 32.9 IN ADULT, UNSPECIFIED OBESITY TYPE: ICD-10-CM

## 2019-06-19 DIAGNOSIS — M25.542 ARTHRALGIA OF BOTH HANDS: ICD-10-CM

## 2019-06-19 DIAGNOSIS — M25.541 ARTHRALGIA OF BOTH HANDS: ICD-10-CM

## 2019-06-19 DIAGNOSIS — E86.0 DEHYDRATION: ICD-10-CM

## 2019-06-19 DIAGNOSIS — M72.2 PLANTAR FASCIITIS: ICD-10-CM

## 2019-06-19 DIAGNOSIS — E78.5 HYPERLIPIDEMIA, UNSPECIFIED HYPERLIPIDEMIA TYPE: ICD-10-CM

## 2019-06-19 DIAGNOSIS — Z00.00 PREVENTATIVE HEALTH CARE: ICD-10-CM

## 2019-06-19 DIAGNOSIS — I10 ESSENTIAL HYPERTENSION: Primary | ICD-10-CM

## 2019-06-19 DIAGNOSIS — I25.10 CORONARY ARTERY DISEASE, ANGINA PRESENCE UNSPECIFIED, UNSPECIFIED VESSEL OR LESION TYPE, UNSPECIFIED WHETHER NATIVE OR TRANSPLANTED HEART: ICD-10-CM

## 2019-06-19 PROCEDURE — 99214 OFFICE O/P EST MOD 30 MIN: CPT | Mod: S$GLB,,, | Performed by: INTERNAL MEDICINE

## 2019-06-19 PROCEDURE — 73630 XR FOOT COMPLETE 3 VIEW RIGHT: ICD-10-PCS | Mod: 26,RT,, | Performed by: RADIOLOGY

## 2019-06-19 PROCEDURE — 99999 PR PBB SHADOW E&M-EST. PATIENT-LVL IV: ICD-10-PCS | Mod: PBBFAC,,, | Performed by: INTERNAL MEDICINE

## 2019-06-19 PROCEDURE — 73630 X-RAY EXAM OF FOOT: CPT | Mod: 26,RT,, | Performed by: RADIOLOGY

## 2019-06-19 PROCEDURE — 99214 PR OFFICE/OUTPT VISIT, EST, LEVL IV, 30-39 MIN: ICD-10-PCS | Mod: S$GLB,,, | Performed by: INTERNAL MEDICINE

## 2019-06-19 PROCEDURE — 73630 X-RAY EXAM OF FOOT: CPT | Mod: TC,FY,PO,RT

## 2019-06-19 PROCEDURE — 99999 PR PBB SHADOW E&M-EST. PATIENT-LVL IV: CPT | Mod: PBBFAC,,, | Performed by: INTERNAL MEDICINE

## 2019-06-19 RX ORDER — OLMESARTAN MEDOXOMIL 40 MG/1
40 TABLET ORAL DAILY
Qty: 30 TABLET | Refills: 1 | Status: SHIPPED | OUTPATIENT
Start: 2019-06-19 | End: 2019-07-19 | Stop reason: SDUPTHER

## 2019-06-19 NOTE — PROGRESS NOTES
Pt. ID: Carlos Morgan is a 69 y.o. male      Chief complaint:   Chief Complaint   Patient presents with    Follow-up       HPI: Pt. Her for R foot pain to base of toes for past 6 months on and off and HTN; he states stretching feet helps; pain is continuous and 3/10; he denies injury but would like xray; voltaren gel prn which he uses for hand arthritis helps; he states he has hx of plantar fascitis; I reviewed labs dated 6/17/19; HGBA1C was 5.3 and mild dehydration was noted; he states he decreased dose of crestor to 20 mg from 40 mg QPM which he relates his arthritic pain; he states 20 mg QPM does not give him these pains; he states cardiology changed ACE to ARB but does not remember what he was placed on; BP is borderline elevated; he has gained weight; he will get shingles vaccine at a local pharmacy; he takes asa QD and sees cardiology for CAD/CABG      Review of Systems   Constitutional: Negative for chills and fever.   Respiratory: Negative for cough and shortness of breath.    Cardiovascular: Negative for chest pain.   Gastrointestinal: Negative for abdominal pain, nausea and vomiting.   Genitourinary: Negative for dysuria.   Musculoskeletal: Positive for joint pain.        Pain noted to base of toes which improves with stretching          Objective:    Physical Exam   Constitutional: He is oriented to person, place, and time.   obese   Eyes: EOM are normal.   Neck: Normal range of motion.   Cardiovascular: Normal rate, regular rhythm and normal heart sounds.   Pulmonary/Chest: Effort normal and breath sounds normal. No respiratory distress. He has no wheezes. He has no rales.   Abdominal: Soft. There is no tenderness. There is no rebound and no guarding.   Musculoskeletal: Normal range of motion. He exhibits tenderness.   Tenderness noted to R sole of foot, base of 1st and 2 nd toes    Neurological: He is alert and oriented to person, place, and time.   Skin: No rash noted.   Vitals  reviewed.        Health Maintenance   Topic Date Due    Colonoscopy  11/05/2019 (Originally 1/2/2017)    Influenza Vaccine  08/01/2019    Aspirin/Antiplatelet Therapy  02/22/2020    High Dose Statin  06/19/2020    Lipid Panel  11/01/2023    TETANUS VACCINE  10/04/2028    Hepatitis C Screening  Completed    Pneumococcal Vaccine (65+ High/Highest Risk)  Completed    Abdominal Aortic Aneurysm Screening  Completed         Assessment:     1. Essential hypertension Sub-optimally controlled   2. Plantar fasciitis Active   3. Dehydration Active   4. Arthralgia of both hands Active   5. Hyperlipidemia, unspecified hyperlipidemia type Active   6. Coronary artery disease, angina presence unspecified, unspecified vessel or lesion type, unspecified whether native or transplanted heart Well controlled   7. Class 1 obesity with serious comorbidity and body mass index (BMI) of 32.0 to 32.9 in adult, unspecified obesity type Sub-optimally controlled   8. Preventative health care Active         Plan: Essential hypertension  Comments:  increase benicar to 40 mg QD and encouraged low Na diet and weight loss; repeat BP on f/u in 1 month   Orders:  -     Comprehensive metabolic panel; Future; Expected date: 07/10/2019  -     olmesartan (BENICAR) 40 MG tablet; Take 1 tablet (40 mg total) by mouth once daily.  Dispense: 30 tablet; Refill: 1    Plantar fasciitis  Comments:  continue stretching exercise and voltaren gel prn; arch supports; get xray R foot and re-evaluate in 1 month     Orders:  -     X-Ray Foot Complete Right; Future; Expected date: 06/19/2019    Dehydration  Comments:  encouraged PO fluid intake   Orders:  -     Comprehensive metabolic panel; Future; Expected date: 07/10/2019    Arthralgia of both hands  Comments:  continue voltaren gel prn    Hyperlipidemia, unspecified hyperlipidemia type  Comments:  repeat lipids prior to 1 month f/u on lower dose of statin; encouraged diet modification  Orders:  -      Comprehensive metabolic panel; Future; Expected date: 07/10/2019  -     Lipid panel; Future; Expected date: 07/10/2019    Coronary artery disease, angina presence unspecified, unspecified vessel or lesion type, unspecified whether native or transplanted heart  Comments:  continue current regimen and f/u cardiology who is managing     Class 1 obesity with serious comorbidity and body mass index (BMI) of 32.0 to 32.9 in adult, unspecified obesity type  Comments:  encouraged diet and explained risks     Preventative health care  -     Comprehensive metabolic panel; Future; Expected date: 07/10/2019  -     Lipid panel; Future; Expected date: 07/10/2019        Problem List Items Addressed This Visit        Cardiac/Vascular    Hyperlipemia    Relevant Orders    Comprehensive metabolic panel    Lipid panel    CAD (coronary artery disease)    HTN (hypertension) - Primary    Relevant Medications    olmesartan (BENICAR) 40 MG tablet    Other Relevant Orders    Comprehensive metabolic panel       Renal/    Dehydration    Relevant Orders    Comprehensive metabolic panel       Endocrine    Class 1 obesity with serious comorbidity and body mass index (BMI) of 32.0 to 32.9 in adult       Orthopedic    Arthralgia of both hands    Plantar fasciitis    Relevant Orders    X-Ray Foot Complete Right       Other    Preventative health care    Relevant Orders    Comprehensive metabolic panel    Lipid panel

## 2019-07-19 ENCOUNTER — OFFICE VISIT (OUTPATIENT)
Dept: INTERNAL MEDICINE | Facility: CLINIC | Age: 69
End: 2019-07-19
Payer: MEDICARE

## 2019-07-19 ENCOUNTER — LAB VISIT (OUTPATIENT)
Dept: LAB | Facility: HOSPITAL | Age: 69
End: 2019-07-19
Attending: INTERNAL MEDICINE
Payer: MEDICARE

## 2019-07-19 VITALS
OXYGEN SATURATION: 97 % | SYSTOLIC BLOOD PRESSURE: 136 MMHG | HEART RATE: 88 BPM | HEIGHT: 72 IN | WEIGHT: 238.75 LBS | BODY MASS INDEX: 32.34 KG/M2 | DIASTOLIC BLOOD PRESSURE: 74 MMHG

## 2019-07-19 DIAGNOSIS — Z12.11 COLON CANCER SCREENING: ICD-10-CM

## 2019-07-19 DIAGNOSIS — I25.10 CORONARY ARTERY DISEASE, ANGINA PRESENCE UNSPECIFIED, UNSPECIFIED VESSEL OR LESION TYPE, UNSPECIFIED WHETHER NATIVE OR TRANSPLANTED HEART: ICD-10-CM

## 2019-07-19 DIAGNOSIS — Z00.00 PREVENTATIVE HEALTH CARE: ICD-10-CM

## 2019-07-19 DIAGNOSIS — I10 ESSENTIAL HYPERTENSION: Primary | ICD-10-CM

## 2019-07-19 DIAGNOSIS — Z12.5 PROSTATE CANCER SCREENING: ICD-10-CM

## 2019-07-19 DIAGNOSIS — R74.8 LOW SERUM HDL: ICD-10-CM

## 2019-07-19 DIAGNOSIS — E86.0 DEHYDRATION: ICD-10-CM

## 2019-07-19 DIAGNOSIS — E78.5 HYPERLIPIDEMIA, UNSPECIFIED HYPERLIPIDEMIA TYPE: ICD-10-CM

## 2019-07-19 PROCEDURE — 99999 PR PBB SHADOW E&M-EST. PATIENT-LVL III: CPT | Mod: PBBFAC,,, | Performed by: INTERNAL MEDICINE

## 2019-07-19 PROCEDURE — 99214 OFFICE O/P EST MOD 30 MIN: CPT | Mod: S$GLB,,, | Performed by: INTERNAL MEDICINE

## 2019-07-19 PROCEDURE — 99214 PR OFFICE/OUTPT VISIT, EST, LEVL IV, 30-39 MIN: ICD-10-PCS | Mod: S$GLB,,, | Performed by: INTERNAL MEDICINE

## 2019-07-19 PROCEDURE — 82274 ASSAY TEST FOR BLOOD FECAL: CPT

## 2019-07-19 PROCEDURE — 99999 PR PBB SHADOW E&M-EST. PATIENT-LVL III: ICD-10-PCS | Mod: PBBFAC,,, | Performed by: INTERNAL MEDICINE

## 2019-07-19 RX ORDER — OLMESARTAN MEDOXOMIL 40 MG/1
40 TABLET ORAL DAILY
Qty: 90 TABLET | Refills: 1 | Status: SHIPPED | OUTPATIENT
Start: 2019-07-19 | End: 2020-05-26 | Stop reason: SDUPTHER

## 2019-07-19 NOTE — PROGRESS NOTES
Pt. ID: Carlos Morgan is a 69 y.o. male      Chief complaint: No chief complaint on file.      HPI: Pt. Here for f/u for HTN and R plantar fascitis; voltaren gel and stretching exercises help; foot xray reviewed and he does not want podiatry referral;  he has increased benicar to 40 mg QD and BP is improving; ; I reviewed labs dated 7/17/19; he continues to show signs of dehydration and he is on HCTZ; cholesterol is WNL; HDL is mildly low; he has not gotten colonoscopy and would like IFOBT; he takes asa 81 mg QD      Review of Systems   Constitutional: Negative for malaise/fatigue.   Eyes: Negative for blurred vision.   Respiratory: Negative for shortness of breath.    Cardiovascular: Negative for chest pain, palpitations, orthopnea and PND.   Musculoskeletal: Negative for neck pain.   Neurological: Negative for headaches.         Objective:    Physical Exam   Constitutional: He is oriented to person, place, and time.   obese   Eyes: EOM are normal.   Neck: Normal range of motion.   Cardiovascular: Normal rate, regular rhythm and normal heart sounds.   Pulmonary/Chest: Effort normal and breath sounds normal. No respiratory distress. He has no wheezes. He has no rales.   Abdominal: Soft. There is no tenderness. There is no rebound and no guarding.   Musculoskeletal: Normal range of motion.   Neurological: He is alert and oriented to person, place, and time.   Skin: No rash noted.   Vitals reviewed.        Health Maintenance   Topic Date Due    Colonoscopy  11/05/2019 (Originally 1/2/2017)    Influenza Vaccine  08/01/2019    Aspirin/Antiplatelet Therapy  02/22/2020    High Dose Statin  07/19/2020    Lipid Panel  07/17/2024    TETANUS VACCINE  10/04/2028    Hepatitis C Screening  Completed    Pneumococcal Vaccine (65+ High/Highest Risk)  Completed    Abdominal Aortic Aneurysm Screening  Completed         Assessment:     1. Essential hypertension Well controlled   2. Dehydration Active   3. Hyperlipidemia,  unspecified hyperlipidemia type Well controlled   4. Coronary artery disease, angina presence unspecified, unspecified vessel or lesion type, unspecified whether native or transplanted heart Well controlled   5. Low serum HDL Active   6. Colon cancer screening Active   7. Preventative health care Active   8. Prostate cancer screening Active         Plan: Essential hypertension  Comments:  continue current regimen and encouraged low Na diet and weight loss  Orders:  -     olmesartan (BENICAR) 40 MG tablet; Take 1 tablet (40 mg total) by mouth once daily.  Dispense: 90 tablet; Refill: 1    Dehydration  Comments:  encouraged PO hydration   Orders:  -     Comprehensive metabolic panel; Future; Expected date: 07/19/2019    Hyperlipidemia, unspecified hyperlipidemia type  Comments:  continue current regimen and encouraged diet modification   Orders:  -     Lipid panel; Future; Expected date: 07/19/2019    Coronary artery disease, angina presence unspecified, unspecified vessel or lesion type, unspecified whether native or transplanted heart  Comments:  continue current regimen and f/u cardiology who is managing     Low serum HDL  Comments:  encouraged exercise to improve HDL and any restriction as per cardiology  Orders:  -     Lipid panel; Future; Expected date: 07/19/2019    Colon cancer screening  -     Fecal Immunochemical Test (iFOBT); Future; Expected date: 07/19/2019    Preventative health care  -     CBC auto differential; Future; Expected date: 07/19/2019  -     Comprehensive metabolic panel; Future; Expected date: 07/19/2019  -     Hemoglobin A1c; Future; Expected date: 07/19/2019  -     Lipid panel; Future; Expected date: 07/19/2019  -     Urinalysis    Prostate cancer screening  -     PSA, Screening; Future; Expected date: 07/19/2019        Problem List Items Addressed This Visit        Cardiac/Vascular    Hyperlipemia    Relevant Orders    Lipid panel    CAD (coronary artery disease)    HTN (hypertension) -  Primary    Relevant Medications    olmesartan (BENICAR) 40 MG tablet       Renal/    Prostate cancer screening    Relevant Orders    PSA, Screening    Dehydration    Relevant Orders    Comprehensive metabolic panel       GI    Colon cancer screening    Relevant Orders    Fecal Immunochemical Test (iFOBT)       Other    Preventative health care    Relevant Orders    CBC auto differential    Comprehensive metabolic panel    Hemoglobin A1c    Lipid panel    Urinalysis    Low serum HDL    Relevant Orders    Lipid panel

## 2019-07-26 LAB — HEMOCCULT STL QL IA: NEGATIVE

## 2019-07-29 DIAGNOSIS — M25.541 ARTHRALGIA OF BOTH HANDS: ICD-10-CM

## 2019-07-29 DIAGNOSIS — M25.542 ARTHRALGIA OF BOTH HANDS: ICD-10-CM

## 2019-07-29 RX ORDER — DICLOFENAC SODIUM 10 MG/G
2 GEL TOPICAL 2 TIMES DAILY PRN
Qty: 100 G | Refills: 1 | Status: SHIPPED | OUTPATIENT
Start: 2019-07-29 | End: 2019-08-05 | Stop reason: SDUPTHER

## 2019-08-05 DIAGNOSIS — M25.542 ARTHRALGIA OF BOTH HANDS: ICD-10-CM

## 2019-08-05 DIAGNOSIS — M25.541 ARTHRALGIA OF BOTH HANDS: ICD-10-CM

## 2019-08-05 RX ORDER — DICLOFENAC SODIUM 10 MG/G
2 GEL TOPICAL 2 TIMES DAILY PRN
Qty: 100 G | Refills: 1 | Status: SHIPPED | OUTPATIENT
Start: 2019-08-05 | End: 2020-05-26 | Stop reason: SDUPTHER

## 2019-11-21 ENCOUNTER — OFFICE VISIT (OUTPATIENT)
Dept: URGENT CARE | Facility: CLINIC | Age: 69
End: 2019-11-21
Payer: MEDICARE

## 2019-11-21 VITALS
TEMPERATURE: 98 F | WEIGHT: 243 LBS | BODY MASS INDEX: 32.91 KG/M2 | SYSTOLIC BLOOD PRESSURE: 138 MMHG | DIASTOLIC BLOOD PRESSURE: 75 MMHG | HEART RATE: 89 BPM | HEIGHT: 72 IN | OXYGEN SATURATION: 98 % | RESPIRATION RATE: 16 BRPM

## 2019-11-21 DIAGNOSIS — R05.9 COUGH: ICD-10-CM

## 2019-11-21 DIAGNOSIS — S16.1XXA STRAIN OF NECK MUSCLE, INITIAL ENCOUNTER: ICD-10-CM

## 2019-11-21 DIAGNOSIS — J32.9 SINUSITIS, UNSPECIFIED CHRONICITY, UNSPECIFIED LOCATION: Primary | ICD-10-CM

## 2019-11-21 PROCEDURE — 99214 OFFICE O/P EST MOD 30 MIN: CPT | Mod: S$GLB,,, | Performed by: NURSE PRACTITIONER

## 2019-11-21 PROCEDURE — 1159F PR MEDICATION LIST DOCUMENTED IN MEDICAL RECORD: ICD-10-PCS | Mod: S$GLB,,, | Performed by: NURSE PRACTITIONER

## 2019-11-21 PROCEDURE — 99214 PR OFFICE/OUTPT VISIT, EST, LEVL IV, 30-39 MIN: ICD-10-PCS | Mod: S$GLB,,, | Performed by: NURSE PRACTITIONER

## 2019-11-21 PROCEDURE — 1159F MED LIST DOCD IN RCRD: CPT | Mod: S$GLB,,, | Performed by: NURSE PRACTITIONER

## 2019-11-21 RX ORDER — METHOCARBAMOL 500 MG/1
500 TABLET, FILM COATED ORAL 3 TIMES DAILY
Qty: 21 TABLET | Refills: 0 | Status: SHIPPED | OUTPATIENT
Start: 2019-11-21 | End: 2019-11-28

## 2019-11-21 RX ORDER — BENZONATATE 100 MG/1
200 CAPSULE ORAL 3 TIMES DAILY PRN
Qty: 30 CAPSULE | Refills: 0 | Status: SHIPPED | OUTPATIENT
Start: 2019-11-21 | End: 2020-01-23

## 2019-11-21 RX ORDER — DOXYCYCLINE 100 MG/1
100 CAPSULE ORAL EVERY 12 HOURS
Qty: 20 CAPSULE | Refills: 0 | Status: SHIPPED | OUTPATIENT
Start: 2019-11-21 | End: 2019-12-01

## 2019-11-21 NOTE — PATIENT INSTRUCTIONS
"Please follow up with your Primary care provider within 2-5 days if your signs and symptoms have not resolved or worsen.  The usual course of cold symptoms are 10-14 days.     If your condition worsens or fails to improve we recommend that you receive another evaluation at the emergency room immediately or contact your primary medical clinic to discuss your concerns.     You must understand that you have received an Urgent Care treatment only and that you may be released before all of your medical problems are known or treated.   You, the patient, will arrange for follow up care as instructed.     Tylenol or Ibuprofen can also be used as directed for pain/fever unless you have an allergy to them or medical condition such as stomach ulcers, kidney or liver disease or blood thinners etc for which you should not be taking these type of medications.     Take over the counter cough medication as directed as needed for cough.  You should avoid medications with pseudoephedrine or phenylephrine (any medication with "D") if you have high blood pressure as this can cause an elevation in your blood pressure. Instead consider Corcidin HBP as needed to prevent an elevated blood pressure.     Natural remedies of symptoms (as needed) include humidification, saline nasal sprays, and/or steamy showers.  Increase fluids, warm tea with honey, cough drops as needed.  You may also use salt water gargles for sore throat.    IF you received a steroid shot today - As discussed, this can elevate your blood pressure, elevate your blood sugar, water weight gain, nervous energy, redness to the face and dimpling of the skin at the injection site.       You have been given Doxycycline for an infection.  Please take all the medication as directed on the bottle.     Doxycycline should be taken with food due to stomach upset.     This medication has sun sensitivity, which means it can cause a severe sunburn if you are exposed to the sunlight.  " Please avoid sunlight when on this medication.     As with any antibiotics, use probiotics and/or high culture yogurt about 2 hours apart from the antibiotic and about 1 week after the antibiotic to replace the gut marco antonio lost with antibiotic use.      If you are female and on BCP use additional methods to prevent pregnancy while on antibiotics and for one cycle after.       Sinusitis (Antibiotic Treatment)    The sinuses are air-filled spaces within the bones of the face. They connect to the inside of the nose. Sinusitis is an inflammation of the tissue lining the sinus cavity. Sinus inflammation can occur during a cold. It can also be due to allergies to pollens and other particles in the air. Sinusitis can cause symptoms of sinus congestion and fullness. A sinus infection causes fever, headache and facial pain. There is often green or yellow drainage from the nose or into the back of the throat (post-nasal drip). You have been given antibiotics to treat this condition.  Home care:  · Take the full course of antibiotics as instructed. Do not stop taking them, even if you feel better.  · Drink plenty of water, hot tea, and other liquids. This may help thin mucus. It also may promote sinus drainage.  · Heat may help soothe painful areas of the face. Use a towel soaked in hot water. Or,  the shower and direct the hot spray onto your face. Using a vaporizer along with a menthol rub at night may also help.   · An expectorant containing guaifenesin may help thin the mucus and promote drainage from the sinuses.  · Over-the-counter decongestants may be used unless a similar medicine was prescribed. Nasal sprays work the fastest. Use one that contains phenylephrine or oxymetazoline. First blow the nose gently. Then use the spray. Do not use these medicines more often than directed on the label or symptoms may get worse. You may also use tablets containing pseudoephedrine. Avoid products that combine ingredients,  because side effects may be increased. Read labels. You can also ask the pharmacist for help. (NOTE: Persons with high blood pressure should not use decongestants. They can raise blood pressure.)  · Over-the-counter antihistamines may help if allergies contributed to your sinusitis.    · Do not use nasal rinses or irrigation during an acute sinus infection, unless told to by your health care provider. Rinsing may spread the infection to other sinuses.  · Use acetaminophen or ibuprofen to control pain, unless another pain medicine was prescribed. (If you have chronic liver or kidney disease or ever had a stomach ulcer, talk with your doctor before using these medicines. Aspirin should never be used in anyone under 18 years of age who is ill with a fever. It may cause severe liver damage.)  · Don't smoke. This can worsen symptoms.  Follow-up care  Follow up with your healthcare provider or our staff if you are not improving within the next week.  When to seek medical advice  Call your healthcare provider if any of these occur:  · Facial pain or headache becoming more severe  · Stiff neck  · Unusual drowsiness or confusion  · Swelling of the forehead or eyelids  · Vision problems, including blurred or double vision  · Fever of 100.4ºF (38ºC) or higher, or as directed by your healthcare provider  · Seizure  · Breathing problems  · Symptoms not resolving within 10 days  Date Last Reviewed: 4/13/2015 © 2000-2017 [employer] [address] [place] [address], [place] [address]. All rights reserved. This information is not intended as a substitute for professional medical care. Always follow your healthcare professional's instructions.

## 2019-11-21 NOTE — PROGRESS NOTES
Subjective:       Patient ID: Carlos Morgan is a 69 y.o. male.    Vitals:  height is 6' (1.829 m) and weight is 110.2 kg (243 lb). His oral temperature is 98.1 °F (36.7 °C). His blood pressure is 138/75 and his pulse is 89. His respiration is 16 and oxygen saturation is 98%.     Chief Complaint: Cough    Cough   This is a new problem. The current episode started 1 to 4 weeks ago (2 weeks). The problem has been gradually worsening. The problem occurs every few minutes. The cough is productive of sputum. Associated symptoms include nasal congestion and postnasal drip. Pertinent negatives include no chills, ear pain, eye redness, fever, hemoptysis, myalgias, rash, sore throat, shortness of breath or wheezing. The symptoms are aggravated by lying down. He has tried OTC cough suppressant (xyzal and nasal spray, delsyum) for the symptoms. The treatment provided mild relief. There is no history of asthma.       Constitution: Negative for chills, sweating, fatigue and fever.   HENT: Positive for congestion and postnasal drip. Negative for ear pain, sinus pain, sinus pressure, sore throat and voice change.    Neck: Negative for painful lymph nodes.   Eyes: Negative for eye redness.   Respiratory: Positive for sputum production. Negative for chest tightness, cough, bloody sputum, COPD, shortness of breath, stridor, wheezing and asthma.    Gastrointestinal: Negative for nausea and vomiting.   Musculoskeletal: Negative for muscle ache.   Skin: Negative for rash.   Allergic/Immunologic: Negative for seasonal allergies and asthma.   Hematologic/Lymphatic: Negative for swollen lymph nodes.       Objective:      Physical Exam   Constitutional: He is oriented to person, place, and time. He appears well-developed and well-nourished. He is cooperative.  Non-toxic appearance. He does not have a sickly appearance. He does not appear ill. No distress.   HENT:   Head: Normocephalic and atraumatic.   Right Ear: Hearing, tympanic  membrane, external ear and ear canal normal.   Left Ear: Hearing, tympanic membrane, external ear and ear canal normal.   Nose: Mucosal edema, rhinorrhea and purulent discharge present. No nasal deformity. No epistaxis. Right sinus exhibits no maxillary sinus tenderness and no frontal sinus tenderness. Left sinus exhibits no maxillary sinus tenderness and no frontal sinus tenderness.   Mouth/Throat: Uvula is midline, oropharynx is clear and moist and mucous membranes are normal. No trismus in the jaw. Normal dentition. No uvula swelling. No oropharyngeal exudate, posterior oropharyngeal edema or posterior oropharyngeal erythema.   Eyes: Conjunctivae and lids are normal. No scleral icterus.   Neck: Trachea normal, full passive range of motion without pain and phonation normal. Neck supple. No neck rigidity. No edema and no erythema present.   Cardiovascular: Normal rate, regular rhythm, normal heart sounds, intact distal pulses and normal pulses.   Pulmonary/Chest: Effort normal and breath sounds normal. No stridor. No respiratory distress. He has no decreased breath sounds. He has no wheezes. He has no rhonchi. He has no rales.   Abdominal: Normal appearance.   Musculoskeletal: He exhibits no edema or deformity.        Cervical back: He exhibits decreased range of motion, tenderness (ttp), pain and spasm. He exhibits no bony tenderness, no swelling, no edema, no deformity, no laceration and normal pulse.        Back:    Lymphadenopathy:     He has cervical adenopathy.        Right cervical: Superficial cervical adenopathy present. No deep cervical and no posterior cervical adenopathy present.       Left cervical: Superficial cervical adenopathy present. No deep cervical and no posterior cervical adenopathy present.   Neurological: He is alert and oriented to person, place, and time. He exhibits normal muscle tone. Coordination normal.   Skin: Skin is warm, dry, intact, not diaphoretic and not pale.   Psychiatric: He  has a normal mood and affect. His speech is normal and behavior is normal. Judgment and thought content normal. Cognition and memory are normal.   Nursing note and vitals reviewed.        Assessment:       1. Sinusitis, unspecified chronicity, unspecified location    2. Cough    3. Strain of neck muscle, initial encounter        Plan:         Sinusitis, unspecified chronicity, unspecified location  -     doxycycline (VIBRAMYCIN) 100 MG Cap; Take 1 capsule (100 mg total) by mouth every 12 (twelve) hours. for 10 days  Dispense: 20 capsule; Refill: 0    Cough  -     benzonatate (TESSALON PERLES) 100 MG capsule; Take 2 capsules (200 mg total) by mouth 3 (three) times daily as needed for Cough.  Dispense: 30 capsule; Refill: 0    Strain of neck muscle, initial encounter  -     methocarbamol (ROBAXIN) 500 MG Tab; Take 1 tablet (500 mg total) by mouth 3 (three) times daily. As needed for muscle spasm. May cause drowsiness for 7 days  Dispense: 21 tablet; Refill: 0      Patient Instructions   Please follow up with your Primary care provider within 2-5 days if your signs and symptoms have not resolved or worsen.  The usual course of cold symptoms are 10-14 days.     If your condition worsens or fails to improve we recommend that you receive another evaluation at the emergency room immediately or contact your primary medical clinic to discuss your concerns.     You must understand that you have received an Urgent Care treatment only and that you may be released before all of your medical problems are known or treated.   You, the patient, will arrange for follow up care as instructed.     Tylenol or Ibuprofen can also be used as directed for pain/fever unless you have an allergy to them or medical condition such as stomach ulcers, kidney or liver disease or blood thinners etc for which you should not be taking these type of medications.     Take over the counter cough medication as directed as needed for cough.  You should  "avoid medications with pseudoephedrine or phenylephrine (any medication with "D") if you have high blood pressure as this can cause an elevation in your blood pressure. Instead consider Corcidin HBP as needed to prevent an elevated blood pressure.     Natural remedies of symptoms (as needed) include humidification, saline nasal sprays, and/or steamy showers.  Increase fluids, warm tea with honey, cough drops as needed.  You may also use salt water gargles for sore throat.    IF you received a steroid shot today - As discussed, this can elevate your blood pressure, elevate your blood sugar, water weight gain, nervous energy, redness to the face and dimpling of the skin at the injection site.       You have been given Doxycycline for an infection.  Please take all the medication as directed on the bottle.     Doxycycline should be taken with food due to stomach upset.     This medication has sun sensitivity, which means it can cause a severe sunburn if you are exposed to the sunlight.  Please avoid sunlight when on this medication.     As with any antibiotics, use probiotics and/or high culture yogurt about 2 hours apart from the antibiotic and about 1 week after the antibiotic to replace the gut marco antonio lost with antibiotic use.      If you are female and on BCP use additional methods to prevent pregnancy while on antibiotics and for one cycle after.       Sinusitis (Antibiotic Treatment)    The sinuses are air-filled spaces within the bones of the face. They connect to the inside of the nose. Sinusitis is an inflammation of the tissue lining the sinus cavity. Sinus inflammation can occur during a cold. It can also be due to allergies to pollens and other particles in the air. Sinusitis can cause symptoms of sinus congestion and fullness. A sinus infection causes fever, headache and facial pain. There is often green or yellow drainage from the nose or into the back of the throat (post-nasal drip). You have been given " antibiotics to treat this condition.  Home care:  · Take the full course of antibiotics as instructed. Do not stop taking them, even if you feel better.  · Drink plenty of water, hot tea, and other liquids. This may help thin mucus. It also may promote sinus drainage.  · Heat may help soothe painful areas of the face. Use a towel soaked in hot water. Or,  the shower and direct the hot spray onto your face. Using a vaporizer along with a menthol rub at night may also help.   · An expectorant containing guaifenesin may help thin the mucus and promote drainage from the sinuses.  · Over-the-counter decongestants may be used unless a similar medicine was prescribed. Nasal sprays work the fastest. Use one that contains phenylephrine or oxymetazoline. First blow the nose gently. Then use the spray. Do not use these medicines more often than directed on the label or symptoms may get worse. You may also use tablets containing pseudoephedrine. Avoid products that combine ingredients, because side effects may be increased. Read labels. You can also ask the pharmacist for help. (NOTE: Persons with high blood pressure should not use decongestants. They can raise blood pressure.)  · Over-the-counter antihistamines may help if allergies contributed to your sinusitis.    · Do not use nasal rinses or irrigation during an acute sinus infection, unless told to by your health care provider. Rinsing may spread the infection to other sinuses.  · Use acetaminophen or ibuprofen to control pain, unless another pain medicine was prescribed. (If you have chronic liver or kidney disease or ever had a stomach ulcer, talk with your doctor before using these medicines. Aspirin should never be used in anyone under 18 years of age who is ill with a fever. It may cause severe liver damage.)  · Don't smoke. This can worsen symptoms.  Follow-up care  Follow up with your healthcare provider or our staff if you are not improving within the next  week.  When to seek medical advice  Call your healthcare provider if any of these occur:  · Facial pain or headache becoming more severe  · Stiff neck  · Unusual drowsiness or confusion  · Swelling of the forehead or eyelids  · Vision problems, including blurred or double vision  · Fever of 100.4ºF (38ºC) or higher, or as directed by your healthcare provider  · Seizure  · Breathing problems  · Symptoms not resolving within 10 days  Date Last Reviewed: 4/13/2015  © 8992-1708 [employer]. [address] [place] [address], [place] [address]. All rights reserved. This information is not intended as a substitute for professional medical care. Always follow your healthcare professional's instructions.

## 2020-01-23 ENCOUNTER — TELEPHONE (OUTPATIENT)
Dept: UROLOGY | Facility: CLINIC | Age: 70
End: 2020-01-23

## 2020-01-23 ENCOUNTER — OFFICE VISIT (OUTPATIENT)
Dept: FAMILY MEDICINE | Facility: CLINIC | Age: 70
End: 2020-01-23
Payer: MEDICARE

## 2020-01-23 VITALS
HEIGHT: 72 IN | TEMPERATURE: 98 F | SYSTOLIC BLOOD PRESSURE: 134 MMHG | OXYGEN SATURATION: 96 % | HEART RATE: 86 BPM | BODY MASS INDEX: 32.97 KG/M2 | WEIGHT: 243.38 LBS | DIASTOLIC BLOOD PRESSURE: 72 MMHG

## 2020-01-23 DIAGNOSIS — E78.2 MIXED HYPERLIPIDEMIA: ICD-10-CM

## 2020-01-23 DIAGNOSIS — I25.10 CORONARY ARTERY DISEASE INVOLVING NATIVE CORONARY ARTERY WITHOUT ANGINA PECTORIS, UNSPECIFIED WHETHER NATIVE OR TRANSPLANTED HEART: Primary | ICD-10-CM

## 2020-01-23 DIAGNOSIS — I10 ESSENTIAL HYPERTENSION: ICD-10-CM

## 2020-01-23 DIAGNOSIS — R39.12 BENIGN PROSTATIC HYPERPLASIA WITH WEAK URINARY STREAM: ICD-10-CM

## 2020-01-23 DIAGNOSIS — M25.552 PAIN OF LEFT HIP JOINT: ICD-10-CM

## 2020-01-23 DIAGNOSIS — N28.1 CYST OF LEFT KIDNEY: ICD-10-CM

## 2020-01-23 DIAGNOSIS — N40.1 BENIGN PROSTATIC HYPERPLASIA WITH WEAK URINARY STREAM: ICD-10-CM

## 2020-01-23 DIAGNOSIS — Z95.1 HX OF CABG: ICD-10-CM

## 2020-01-23 DIAGNOSIS — G47.30 SLEEP APNEA, UNSPECIFIED TYPE: ICD-10-CM

## 2020-01-23 DIAGNOSIS — R74.8 ELEVATED CPK: ICD-10-CM

## 2020-01-23 DIAGNOSIS — M54.2 CERVICALGIA: ICD-10-CM

## 2020-01-23 DIAGNOSIS — E55.9 VITAMIN D DEFICIENCY: ICD-10-CM

## 2020-01-23 DIAGNOSIS — K76.0 FATTY LIVER: ICD-10-CM

## 2020-01-23 PROBLEM — E86.0 DEHYDRATION: Status: RESOLVED | Noted: 2019-06-19 | Resolved: 2020-01-23

## 2020-01-23 PROBLEM — Z23 NEEDS FLU SHOT: Status: RESOLVED | Noted: 2018-10-04 | Resolved: 2020-01-23

## 2020-01-23 PROBLEM — J18.9 PNEUMONIA OF LEFT LOWER LOBE DUE TO INFECTIOUS ORGANISM: Status: RESOLVED | Noted: 2019-03-14 | Resolved: 2020-01-23

## 2020-01-23 PROBLEM — J41.1 BRONCHITIS, MUCOPURULENT RECURRENT: Status: RESOLVED | Noted: 2019-02-22 | Resolved: 2020-01-23

## 2020-01-23 PROBLEM — R68.89 FLU-LIKE SYMPTOMS: Status: RESOLVED | Noted: 2019-02-22 | Resolved: 2020-01-23

## 2020-01-23 PROBLEM — J06.9 VIRAL URI WITH COUGH: Status: RESOLVED | Noted: 2019-02-12 | Resolved: 2020-01-23

## 2020-01-23 PROBLEM — Z23 NEED FOR PROPHYLACTIC VACCINATION WITH STREPTOCOCCUS PNEUMONIAE (PNEUMOCOCCUS) AND INFLUENZA VACCINES: Status: RESOLVED | Noted: 2018-11-05 | Resolved: 2020-01-23

## 2020-01-23 PROCEDURE — 99999 PR PBB SHADOW E&M-EST. PATIENT-LVL V: ICD-10-PCS | Mod: PBBFAC,,, | Performed by: INTERNAL MEDICINE

## 2020-01-23 PROCEDURE — 1159F PR MEDICATION LIST DOCUMENTED IN MEDICAL RECORD: ICD-10-PCS | Mod: S$GLB,,, | Performed by: INTERNAL MEDICINE

## 2020-01-23 PROCEDURE — 1159F MED LIST DOCD IN RCRD: CPT | Mod: S$GLB,,, | Performed by: INTERNAL MEDICINE

## 2020-01-23 PROCEDURE — 99999 PR PBB SHADOW E&M-EST. PATIENT-LVL V: CPT | Mod: PBBFAC,,, | Performed by: INTERNAL MEDICINE

## 2020-01-23 PROCEDURE — 99214 PR OFFICE/OUTPT VISIT, EST, LEVL IV, 30-39 MIN: ICD-10-PCS | Mod: S$GLB,,, | Performed by: INTERNAL MEDICINE

## 2020-01-23 PROCEDURE — 99214 OFFICE O/P EST MOD 30 MIN: CPT | Mod: S$GLB,,, | Performed by: INTERNAL MEDICINE

## 2020-01-23 PROCEDURE — 1126F PR PAIN SEVERITY QUANTIFIED, NO PAIN PRESENT: ICD-10-PCS | Mod: S$GLB,,, | Performed by: INTERNAL MEDICINE

## 2020-01-23 PROCEDURE — 1126F AMNT PAIN NOTED NONE PRSNT: CPT | Mod: S$GLB,,, | Performed by: INTERNAL MEDICINE

## 2020-01-23 RX ORDER — ROSUVASTATIN CALCIUM 20 MG/1
20 TABLET, COATED ORAL NIGHTLY
Qty: 90 TABLET | Refills: 1 | Status: SHIPPED | OUTPATIENT
Start: 2020-01-23 | End: 2020-08-13 | Stop reason: SDUPTHER

## 2020-01-23 RX ORDER — UBIDECARENONE 30 MG
500 CAPSULE ORAL DAILY
COMMUNITY

## 2020-01-23 RX ORDER — ROSUVASTATIN CALCIUM 20 MG/1
20 TABLET, COATED ORAL NIGHTLY
Qty: 90 TABLET | Refills: 1
Start: 2020-01-23 | End: 2020-01-23 | Stop reason: SDUPTHER

## 2020-01-23 RX ORDER — ROSUVASTATIN CALCIUM 20 MG/1
TABLET, COATED ORAL
COMMUNITY
Start: 2019-11-14 | End: 2020-01-23 | Stop reason: SDUPTHER

## 2020-01-23 RX ORDER — FLAXSEED OIL 1000 MG
CAPSULE ORAL
COMMUNITY
End: 2023-03-07

## 2020-01-23 NOTE — PATIENT INSTRUCTIONS
We have reviewed your prescription medications.   You already had your flu shot this year.   I would like to check PSA and refer you to urology because the chart says you may have had prostate cancer.   We will refer to physical therapy for hip pain and neck pain.  Follow-up with cardiology as recommended.   Follow-up with me in 6 months.

## 2020-01-23 NOTE — TELEPHONE ENCOUNTER
----- Message from Mita Smith sent at 1/23/2020  4:40 PM CST -----  Patient is being referred by Dr. Amador for prostate. Patient [place] Dr. Gonzalez [place]. Can you please schedule and appt. Thanks

## 2020-01-27 ENCOUNTER — PATIENT MESSAGE (OUTPATIENT)
Dept: FAMILY MEDICINE | Facility: CLINIC | Age: 70
End: 2020-01-27

## 2020-01-27 NOTE — PROGRESS NOTES
NEW PATIENT VISIT INTERNAL MEDICINE    Patient Active Problem List   Diagnosis    Gastroenteritis    Hyperlipemia    CAD (coronary artery disease)    Obesity    HTN (hypertension)    Total bilirubin, elevated    Elevated CPK    Class 1 obesity with serious comorbidity and body mass index (BMI) of 32.0 to 32.9 in adult    Toenail fungus    Vitamin D deficiency    Colon cancer screening    Prostate cancer    Allergic rhinitis    Fatty liver    Cyst of left kidney    Arthralgia of both hands    Hearing loss of right ear    Acute otitis externa of right ear    Tinnitus of right ear    Plantar fasciitis    Low serum HDL    Hx of CABG    Sleep apnea       CC:   Chief Complaint   Patient presents with    Establish Care       HPI: Carlos Morgan   is a 69 y.o. male  I have reviewed the PMH,  and FH for this patient    He  has a past medical history of CAD (coronary artery disease), Hyperlipidemia, Hypertension, Plantar fasciitis, and Sleep apnea (2005).     The patient presents today for follow-up of chronic conditions. He has a family history of prostate cancer. His chart lists a diagnosis of prostate cancer, but he is not aware of any prostate ca in his history. He did tell me that he had seen urology for enlarged prostate. I recommended that he see urology again to make sure that everything was normal. He is a former patient of Dr. Bone who is here to establish care with me. His wife Clarita is my patient. He has been having neck pain and left hip pain. He takes his medicines for blood pressure. He checks his Bp at home and it is usually 120-140/60-70. His cholesterol was well-controlled in June. He takes crestor.     The patient denies chest pain, shortness of breath, nausea, or dizziness.         ROS: Review of Systems   Constitutional: Negative for chills, fatigue and fever.   HENT: Negative for congestion, ear discharge, ear pain, rhinorrhea and sore throat.    Eyes: Negative for  discharge, redness and itching.   Respiratory: Negative for cough, chest tightness, shortness of breath and wheezing.    Cardiovascular: Negative for chest pain, palpitations and leg swelling.   Gastrointestinal: Negative for abdominal pain, constipation, diarrhea, nausea and vomiting.   Endocrine: Negative for cold intolerance and heat intolerance.   Genitourinary: Negative for dysuria, flank pain, frequency and hematuria.   Musculoskeletal: Negative for arthralgias, back pain, joint swelling and myalgias.   Skin: Negative for color change and rash.   Neurological: Negative for dizziness, tremors, numbness and headaches.   Psychiatric/Behavioral: Negative for dysphoric mood and sleep disturbance. The patient is not nervous/anxious.        PMHX:   Past Medical History:   Diagnosis Date    CAD (coronary artery disease)     cabg    Hyperlipidemia     Hypertension     Plantar fasciitis     Sleep apnea 2005    cpap, 2005       PSHX:   Past Surgical History:   Procedure Laterality Date    CORONARY ARTERY BYPASS GRAFT  2007       FAMHX:   Family History   Problem Relation Age of Onset    Depression Mother     Diabetes Mother     Cancer Father         prostate    Hypertension Brother     Heart disease Maternal Grandfather        SOCHX:   Social History     Socioeconomic History    Marital status:      Spouse name: Not on file    Number of children: Not on file    Years of education: Not on file    Highest education level: Not on file   Occupational History    Not on file   Social Needs    Financial resource strain: Not hard at all    Food insecurity:     Worry: Never true     Inability: Never true    Transportation needs:     Medical: No     Non-medical: No   Tobacco Use    Smoking status: Former Smoker    Smokeless tobacco: Never Used   Substance and Sexual Activity    Alcohol use: Yes     Frequency: 2-4 times a month     Binge frequency: Never     Comment: socially    Drug use: Never     Sexual activity: Yes     Partners: Female   Lifestyle    Physical activity:     Days per week: 1 day     Minutes per session: 30 min    Stress: Only a little   Relationships    Social connections:     Talks on phone: Three times a week     Gets together: Three times a week     Attends Taoist service: Not on file     Active member of club or organization: Yes     Attends meetings of clubs or organizations: More than 4 times per year     Relationship status:    Other Topics Concern    Not on file   Social History Narrative    Not on file       ALLERGIES:   Review of patient's allergies indicates:   Allergen Reactions    Lisinopril Other (See Comments)     Hearing loss       MEDS:   Current Outpatient Medications:     amLODIPine (NORVASC) 10 MG tablet, Take 1 tablet (10 mg total) by mouth every evening., Disp: 30 tablet, Rfl: 1    aspirin 162.5 mg Cp24, Take 81 mg by mouth., Disp: , Rfl:     co-enzyme Q-10 30 mg capsule, Take 30 mg by mouth 3 (three) times daily., Disp: , Rfl:     diclofenac sodium (VOLTAREN) 1 % Gel, Apply 2 g topically 2 (two) times daily as needed., Disp: 100 g, Rfl: 1    flaxseed oil 1,000 mg Cap, Take by mouth., Disp: , Rfl:     hydroCHLOROthiazide (MICROZIDE) 12.5 mg capsule, , Disp: , Rfl:     olmesartan (BENICAR) 40 MG tablet, Take 1 tablet (40 mg total) by mouth once daily., Disp: 90 tablet, Rfl: 1    rosuvastatin (CRESTOR) 20 MG tablet, Take 1 tablet (20 mg total) by mouth every evening., Disp: 90 tablet, Rfl: 1    vitamin D (VITAMIN D3) 1000 units Tab, Take 185 mg by mouth 2 (two) times daily., Disp: , Rfl:     OBJECTIVE:   Vitals:    01/23/20 1514   BP: 134/72   BP Location: Left arm   Patient Position: Sitting   BP Method: Large (Manual)   Pulse: 86   Temp: 98.2 °F (36.8 °C)   TempSrc: Oral   SpO2: 96%   Weight: 110.4 kg (243 lb 6.4 oz)   Height: 6' (1.829 m)     Body mass index is 33.01 kg/m².    Physical Exam   Constitutional: He appears well-developed and  well-nourished. He does not have a sickly appearance.   HENT:   Head: Normocephalic and atraumatic.   Right Ear: External ear normal. Tympanic membrane is not erythematous. No middle ear effusion.   Left Ear: External ear normal. Tympanic membrane is not erythematous.  No middle ear effusion.   Nose: No rhinorrhea. Right sinus exhibits no maxillary sinus tenderness and no frontal sinus tenderness. Left sinus exhibits no maxillary sinus tenderness and no frontal sinus tenderness.   Mouth/Throat: No posterior oropharyngeal edema or posterior oropharyngeal erythema. No tonsillar exudate.   Eyes: Pupils are equal, round, and reactive to light. Conjunctivae and EOM are normal. Right eye exhibits no discharge. Left eye exhibits no discharge. Right conjunctiva is not injected. Left conjunctiva is not injected.   Neck: No thyromegaly present.   Cardiovascular: Normal rate, regular rhythm, normal heart sounds and intact distal pulses.   No murmur heard.  Pulmonary/Chest: Effort normal and breath sounds normal. He has no wheezes. He has no rhonchi. He has no rales.   Abdominal: Bowel sounds are normal. He exhibits no distension. There is no tenderness.   Musculoskeletal: He exhibits no edema or tenderness.   Lymphadenopathy:     He has no cervical adenopathy.   Neurological: He displays normal reflexes. No cranial nerve deficit.   Skin: Skin is warm and dry. No lesion and no rash noted.   Psychiatric: He has a normal mood and affect. His behavior is normal. His mood appears not anxious. His speech is not rapid and/or pressured. He is not agitated and not aggressive. He does not exhibit a depressed mood.         Depression Patient Health Questionnaire 1/23/2020 7/19/2019   Over the last two weeks how often have you been bothered by little interest or pleasure in doing things 0 0   Over the last two weeks how often have you been bothered by feeling down, depressed or hopeless 0 0   PHQ-2 Total Score 0 0       PERTINENT RESULTS:    None    ASSESSMENT:  Problem List Items Addressed This Visit        Cardiac/Vascular    Hyperlipemia - on crestor. Check labs.       CAD (coronary artery disease) - Primary - stable. Control bp and cholesterol.       HTN (hypertension) - BP looks good. Continue current meds.       Hx of CABG - stable.        Renal/    Cyst of left kidney - stable.        Endocrine    Vitamin D deficiency - check vitamin D level.        GI    Fatty liver - check labs. Lose weight.        Other    Elevated CPK - we will check level.       Sleep apnea - he uses cpap.       Other Visit Diagnoses     Benign prostatic hyperplasia with weak urinary stream     - follow-up with urology. Stopped flomax because he didn't think it helped.     Relevant Orders    Ambulatory referral to Urology    PSA, Screening (Completed)    Cervicalgia     - order PT for further evaluation.     Relevant Orders    Ambulatory Referral to Physical/Occupational Therapy    Pain of left hip joint     - order PT for further evaluation.     Relevant Orders    Ambulatory Referral to Physical/Occupational Therapy          PLAN:   Orders Placed This Encounter    PSA, Screening    Ambulatory referral to Urology    Ambulatory Referral to Physical/Occupational Therapy    rosuvastatin (CRESTOR) 20 MG tablet           Follow-up with me in 6 months.       Alisha Amador MD, FACP  Internal Medicine

## 2020-01-29 PROBLEM — M25.552 PAIN IN LEFT HIP: Status: ACTIVE | Noted: 2020-01-29

## 2020-01-29 PROBLEM — M54.2 PAIN IN NECK: Status: ACTIVE | Noted: 2020-01-29

## 2020-01-29 PROBLEM — R53.1 WEAKNESS: Status: ACTIVE | Noted: 2020-01-29

## 2020-02-18 ENCOUNTER — OFFICE VISIT (OUTPATIENT)
Dept: UROLOGY | Facility: CLINIC | Age: 70
End: 2020-02-18
Payer: MEDICARE

## 2020-02-18 VITALS
WEIGHT: 243 LBS | BODY MASS INDEX: 32.91 KG/M2 | DIASTOLIC BLOOD PRESSURE: 80 MMHG | HEART RATE: 76 BPM | HEIGHT: 72 IN | OXYGEN SATURATION: 98 % | RESPIRATION RATE: 17 BRPM | TEMPERATURE: 98 F | SYSTOLIC BLOOD PRESSURE: 102 MMHG

## 2020-02-18 DIAGNOSIS — R39.12 BENIGN PROSTATIC HYPERPLASIA WITH WEAK URINARY STREAM: ICD-10-CM

## 2020-02-18 DIAGNOSIS — N28.1 CYST OF LEFT KIDNEY: Primary | ICD-10-CM

## 2020-02-18 DIAGNOSIS — N40.1 BENIGN PROSTATIC HYPERPLASIA WITH WEAK URINARY STREAM: ICD-10-CM

## 2020-02-18 DIAGNOSIS — Z80.42 FAMILY HISTORY OF PROSTATE CANCER IN FATHER: ICD-10-CM

## 2020-02-18 PROCEDURE — 99999 PR PBB SHADOW E&M-EST. PATIENT-LVL IV: ICD-10-PCS | Mod: PBBFAC,,, | Performed by: UROLOGY

## 2020-02-18 PROCEDURE — 99204 OFFICE O/P NEW MOD 45 MIN: CPT | Mod: S$GLB,,, | Performed by: UROLOGY

## 2020-02-18 PROCEDURE — 99999 PR PBB SHADOW E&M-EST. PATIENT-LVL IV: CPT | Mod: PBBFAC,,, | Performed by: UROLOGY

## 2020-02-18 PROCEDURE — 99204 PR OFFICE/OUTPT VISIT, NEW, LEVL IV, 45-59 MIN: ICD-10-PCS | Mod: S$GLB,,, | Performed by: UROLOGY

## 2020-02-18 RX ORDER — DUTASTERIDE 0.5 MG/1
0.5 CAPSULE, LIQUID FILLED ORAL DAILY
Qty: 90 CAPSULE | Refills: 3 | Status: SHIPPED | OUTPATIENT
Start: 2020-02-18 | End: 2020-12-08

## 2020-02-18 NOTE — PROGRESS NOTES
Subjective:       Patient ID: Carlos Morgan is a 69 y.o. male.    Chief Complaint: Other (unable to change urine flow)    68 yo gentleman with BPH and LUTS - slow stream.  Frequency increase with a diuretic, no nocturia, no in a strangury, no hesitancy.  Referred from Dr. Amador.  In patient's history there stated that he had prostate cancer however he did not have prostate cancer only as father has been diagnosed with prostate cancer at age of 80 years old.  That has been removed from his past medical history.    Benign Prostatic Hypertrophy   This is a chronic problem. The current episode started more than 1 year ago. The problem has been gradually worsening since onset. Irritative symptoms include frequency (with HCTZ). Irritative symptoms do not include nocturia or urgency. Obstructive symptoms include a slower stream. Obstructive symptoms do not include dribbling, incomplete emptying, an intermittent stream, straining or a weak stream. Pertinent negatives include no chills, dysuria, genital pain, hematuria, hesitancy, nausea or vomiting. AUA score is 8-19. He is sexually active. Nothing aggravates the symptoms. Past treatments include tamsulosin and dutasteride (Took in past 5 yrs ago and quit due to side effects). He has been using treatment for 6 to 12 months.     Review of Systems   Constitutional: Negative for activity change, appetite change, chills, diaphoresis, fatigue, fever and unexpected weight change.   HENT: Negative for congestion, hearing loss, sinus pressure and trouble swallowing.    Eyes: Negative for photophobia, pain, discharge and visual disturbance.   Respiratory: Negative for apnea, cough and shortness of breath.    Cardiovascular: Negative for chest pain, palpitations and leg swelling.   Gastrointestinal: Negative for abdominal distention, abdominal pain, anal bleeding, blood in stool, constipation, diarrhea, nausea, rectal pain and vomiting.   Endocrine: Negative for cold  intolerance, heat intolerance, polydipsia, polyphagia and polyuria.   Genitourinary: Positive for frequency (with HCTZ). Negative for decreased urine volume, difficulty urinating, discharge, dysuria, enuresis, flank pain, genital sores, hematuria, hesitancy, incomplete emptying, nocturia, penile pain, penile swelling, scrotal swelling, testicular pain and urgency.   Musculoskeletal: Negative for arthralgias, back pain and myalgias.   Skin: Negative for color change, pallor, rash and wound.   Allergic/Immunologic: Negative for environmental allergies, food allergies and immunocompromised state.   Neurological: Negative for dizziness, seizures, weakness and headaches.   Hematological: Negative for adenopathy. Does not bruise/bleed easily.   Psychiatric/Behavioral: Negative.        Objective:      Physical Exam   Nursing note and vitals reviewed.  Constitutional: He is oriented to person, place, and time. He appears well-developed and well-nourished.   HENT:   Head: Normocephalic.   Nose: Nose normal.   Mouth/Throat: Oropharynx is clear and moist.   Eyes: Conjunctivae and EOM are normal. Pupils are equal, round, and reactive to light.   Neck: Normal range of motion. Neck supple.   Cardiovascular: Normal rate, regular rhythm, normal heart sounds and intact distal pulses.    Pulmonary/Chest: Effort normal and breath sounds normal.   Abdominal: Soft. Bowel sounds are normal.   Genitourinary: Rectum normal, testes normal and penis normal. Prostate is enlarged.   Musculoskeletal: Normal range of motion.   Neurological: He is alert and oriented to person, place, and time. He has normal reflexes.   Skin: Skin is warm and dry.     Psychiatric: He has a normal mood and affect. His behavior is normal. Judgment and thought content normal.       Assessment:       1. Cyst of left kidney    2. Family history of prostate cancer in father    3. Benign prostatic hyperplasia with weak urinary stream        Plan:       Patient  Instructions   Avodart 0.5 mg daily  F/U 6 months

## 2020-02-18 NOTE — LETTER
February 19, 2020      Yue Amador MD  [address]  [address]   [facility] [address]           [place] [facility]  [address] [place] [address], [address]  [facility] [address]  Phone: [phone]  Fax: [phone]          Patient: Carlos Morgan   MR Number: [id number]   YOB: 1950   Date of Visit: 2/18/2020       Dear Dr. Yue Amador:    Thank you for referring Carlos Morgan to me for evaluation. Attached you will find relevant portions of my assessment and plan of care.    If you have questions, please do not hesitate to call me. I look forward to following Carlos Morgan along with you.    Sincerely,    Piotr Gonzalez MD    Enclosure  CC:  No Recipients    If you would like to receive this communication electronically, please contact [email or web address] or [phone] to request more information on [employer] access.    For providers and/or their staff who would like to refer a patient to [facility], please contact us through our one-stop-shop provider referral line, [facility], at [phone].    If you feel you have received this communication in error or would no longer like to receive these types of communications, please e-mail [email or web address]

## 2020-05-04 ENCOUNTER — PATIENT MESSAGE (OUTPATIENT)
Dept: FAMILY MEDICINE | Facility: CLINIC | Age: 70
End: 2020-05-04

## 2020-05-04 RX ORDER — HYDROCHLOROTHIAZIDE 12.5 MG/1
12.5 CAPSULE ORAL DAILY
Qty: 90 CAPSULE | Refills: 0 | Status: SHIPPED | OUTPATIENT
Start: 2020-05-04 | End: 2020-08-06

## 2020-05-08 ENCOUNTER — PATIENT MESSAGE (OUTPATIENT)
Dept: UROLOGY | Facility: CLINIC | Age: 70
End: 2020-05-08

## 2020-05-08 ENCOUNTER — TELEPHONE (OUTPATIENT)
Dept: SPORTS MEDICINE | Facility: CLINIC | Age: 70
End: 2020-05-08

## 2020-05-08 NOTE — TELEPHONE ENCOUNTER
----- Message from Shahana Mejia sent at 5/8/2020  8:36 AM CDT -----  Contact: pt   Pt needing a call back regarding scheduling an appt for muscle pain  . Please contact pt     Pt contact# [phone]

## 2020-05-08 NOTE — TELEPHONE ENCOUNTER
"Pt has been participating in PT for "stiff neck" and "sore hip". Good relief with this until he had to d/c due to COVID. Currently c/o back pain x2 weeks after cleaning the house. Still feeling very stiff, especially in the morning. Denies radicular symptoms.    Pt's wife is Clarita Morgan, who is a pt of Dr. Obrien.     Scheduled appt for 5/11 at 2:20 pm [facility]    Honey Gomez  Orthopedic Clinical Assistant to Dr. Obrien   "

## 2020-05-11 ENCOUNTER — OFFICE VISIT (OUTPATIENT)
Dept: ORTHOPEDICS | Facility: CLINIC | Age: 70
End: 2020-05-11
Payer: MEDICARE

## 2020-05-11 VITALS
WEIGHT: 243 LBS | SYSTOLIC BLOOD PRESSURE: 144 MMHG | HEIGHT: 72 IN | BODY MASS INDEX: 32.91 KG/M2 | DIASTOLIC BLOOD PRESSURE: 80 MMHG

## 2020-05-11 DIAGNOSIS — M15.1 HEBERDEN'S NODES: ICD-10-CM

## 2020-05-11 DIAGNOSIS — M79.641 RIGHT HAND PAIN: Primary | ICD-10-CM

## 2020-05-11 DIAGNOSIS — M15.2 BOUCHARD'S NODES: ICD-10-CM

## 2020-05-11 PROCEDURE — 99999 PR PBB SHADOW E&M-EST. PATIENT-LVL III: CPT | Mod: PBBFAC,,, | Performed by: ORTHOPAEDIC SURGERY

## 2020-05-11 PROCEDURE — 99204 PR OFFICE/OUTPT VISIT, NEW, LEVL IV, 45-59 MIN: ICD-10-PCS | Mod: S$GLB,,, | Performed by: ORTHOPAEDIC SURGERY

## 2020-05-11 PROCEDURE — 99204 OFFICE O/P NEW MOD 45 MIN: CPT | Mod: S$GLB,,, | Performed by: ORTHOPAEDIC SURGERY

## 2020-05-11 PROCEDURE — 99999 PR PBB SHADOW E&M-EST. PATIENT-LVL III: ICD-10-PCS | Mod: PBBFAC,,, | Performed by: ORTHOPAEDIC SURGERY

## 2020-05-11 RX ORDER — MELOXICAM 15 MG/1
15 TABLET ORAL DAILY
Qty: 30 TABLET | Refills: 0 | Status: SHIPPED | OUTPATIENT
Start: 2020-05-11 | End: 2020-06-08

## 2020-05-11 NOTE — PROGRESS NOTES
CC: right hand pain    69 y.o. Male presents today for evaluation of his right hand pain. Patient admits to right hand pain beginning approximately one week ago while making yesenia slaw. He recalls gripping a kitchen tool when making the yesenia slaw and believes this is what has contributed to his increased pain and stiffness. Patient states he has noticed decreased  strength in addition to decreased range of motion of the right hand. He admits to this complaint improving throughout the day, and being worst first thing in the morning. He denies recent falls or trauma.   How long: Patient admits to right hand pain for the past several days.  Symptoms have been slowly improving, but he has been unable to  a golf club due to the pain.  What makes it better: Patient reports his pain improves throughout the course of the day.   What makes it worse: Patient reports increased pain with gripping activities.   Does it radiate: Denies radiating pain.   Attempted treatments: Patient states he has been taking Tylenol as needed and will occasionally take Aleve which he feels works better towards improving his pain. He has also been applying sodium cream as needed. Denies formal physical therapy to address his hand pain. Denies the application of ice or heat. Denies prior history of injection or surgery into either hand.   Pain score: 2/10  Any mechanical symptoms: Denies mechanical symptoms.  Feelings of instability: Denies feelings of instability.   Affecting ADL: Patient admits to this problem affecting his ability to perform ADLs.     REVIEW OF SYSTEMS:   Constitution: Patient denies fever, chills, night sweats, and weight changes.  Eyes: Patient denies eye pain or vision changes.  HENT: Patient denies headache, ear pain, sore throat, or nasal discharge.  CVS: Patient denies chest pain.  Lungs: Patient denies shortness of breath or cough.  Abd: Patient denies stomach pain, nausea, or vomiting.  Skin: Patient denies skin  rash or itching.    Hematologic/Lymphatic: Patient denies easy bruising.   Musculoskeletal: Patient denies any falls. See HPI.  Psych: Patient denies any current anxiety or nervousness.    PAST MEDICAL HISTORY:   Past Medical History:   Diagnosis Date    CAD (coronary artery disease)     cabg    Disorder of kidney and ureter     Hyperlipidemia     Hypertension     Plantar fasciitis     Sleep apnea 2005    cpap, 2005    Urinary tract infection        PAST SURGICAL HISTORY:   Past Surgical History:   Procedure Laterality Date    CORONARY ARTERY BYPASS GRAFT  2007       FAMILY HISTORY:   Family History   Problem Relation Age of Onset    Depression Mother     Diabetes Mother     Cancer Father         prostate    Hypertension Brother     Heart disease Maternal Grandfather     Prostate cancer Neg Hx     Kidney disease Neg Hx        SOCIAL HISTORY:   Social History     Socioeconomic History    Marital status:      Spouse name: Not on file    Number of children: Not on file    Years of education: Not on file    Highest education level: Not on file   Occupational History    Not on file   Social Needs    Financial resource strain: Not hard at all    Food insecurity:     Worry: Never true     Inability: Never true    Transportation needs:     Medical: No     Non-medical: No   Tobacco Use    Smoking status: Former Smoker    Smokeless tobacco: Never Used   Substance and Sexual Activity    Alcohol use: Yes     Frequency: 2-4 times a month     Binge frequency: Never     Comment: socially    Drug use: Never    Sexual activity: Yes     Partners: Female   Lifestyle    Physical activity:     Days per week: 1 day     Minutes per session: 30 min    Stress: Only a little   Relationships    Social connections:     Talks on phone: Three times a week     Gets together: Three times a week     Attends Denominational service: Not on file     Active member of club or organization: Yes     Attends meetings of  clubs or organizations: More than 4 times per year     Relationship status:    Other Topics Concern    Not on file   Social History Narrative    Not on file       MEDICATIONS:     Current Outpatient Medications:     amLODIPine (NORVASC) 10 MG tablet, Take 1 tablet (10 mg total) by mouth every evening., Disp: 30 tablet, Rfl: 1    aspirin 162.5 mg Cp24, Take 81 mg by mouth., Disp: , Rfl:     co-enzyme Q-10 30 mg capsule, Take 30 mg by mouth 3 (three) times daily., Disp: , Rfl:     diclofenac sodium (VOLTAREN) 1 % Gel, Apply 2 g topically 2 (two) times daily as needed., Disp: 100 g, Rfl: 1    dutasteride (AVODART) 0.5 mg capsule, Take 1 capsule (0.5 mg total) by mouth once daily., Disp: 90 capsule, Rfl: 3    finasteride (PROSCAR) 5 mg tablet, Take 1 tablet (5 mg total) by mouth once daily., Disp: 30 tablet, Rfl: 11    flaxseed oil 1,000 mg Cap, Take by mouth., Disp: , Rfl:     hydroCHLOROthiazide (MICROZIDE) 12.5 mg capsule, Take 1 capsule (12.5 mg total) by mouth once daily., Disp: 90 capsule, Rfl: 0    meloxicam (MOBIC) 15 MG tablet, Take 1 tablet (15 mg total) by mouth once daily., Disp: 30 tablet, Rfl: 0    olmesartan (BENICAR) 40 MG tablet, Take 1 tablet (40 mg total) by mouth once daily., Disp: 90 tablet, Rfl: 1    rosuvastatin (CRESTOR) 20 MG tablet, Take 1 tablet (20 mg total) by mouth every evening., Disp: 90 tablet, Rfl: 1    vitamin D (VITAMIN D3) 1000 units Tab, Take 185 mg by mouth 2 (two) times daily., Disp: , Rfl:     ALLERGIES:   Review of patient's allergies indicates:   Allergen Reactions    Lisinopril Other (See Comments)     Hearing loss        PHYSICAL EXAMINATION:  BP (!) 144/80   Ht 6' (1.829 m)   Wt 110.2 kg (243 lb)   BMI 32.96 kg/m²   Vitals signs and nursing note have been reviewed.  General: In no acute distress, well developed, well nourished, no diaphoresis  Eyes: EOM full and smooth, no eye redness or discharge  HENT: normocephalic and atraumatic, neck supple,  trachea midline, no nasal discharge, no external ear redness or discharge  Cardiovascular: 2+ and symmetric radial pulses bilaterally, no LE edema  Lungs: respirations non-labored, no conversational dyspnea   Abd: non-distended, no rigidity  MSK: no amputation or deformity, no swelling of extremities  Neuro: AAOx3, CN2-12 grossly intact  Skin: No rashes, warm and dry  Psychiatric: cooperative, pleasant, mood and affect appropriate for age    HAND: RIGHT  The affected hand is compared to the contralateral hand .    Observation:  No evidence of edema, erythema, ecchymosis, or effusion.  No asymmetries; muscle atrophy; ganglion cysts; or bony abnormalities including ulnar deviation.  + Heberdens and Brouchards nodes.  No swan-neck or boutonnière deformities.    No clubbing of the digits.    Tenderness:  No tenderness at radial styloid, Júnior's tubercle, ulnar styloid.  No tenderness at anatomic snuff box, scaphoid tubercle, scapholunate junction.  No tenderness at TFCC.  No tenderness at pisiform, hamate, metacarpals and phalanges.  No tenderness over median nerve at the carpal tunnel.  + tenderness along the hypothenar eminence    Range of Motion:  Active wrist extension to 70° on left and 70° on right.    Active wrist flexion to 80° on left and 80° on right.    Active radial deviation to 20° on left and 20° on right.    Active ulnar deviation to 30° on left and 30° on right.    Active pronation to 80° on left and 80° on right.    Active supination to 80° on left and 80° on right.    Full active flexion and extension at the MCP, PIP, and DIP bilaterally.   Active thumb motion full in all planes.    Strength Testing:  Wrist extension - 5/5 on left and 5/5 on right  Wrist flexion - 5/5 on left and 5/5 on right  Ulnar deviation - 5/5 on left and 5/5 on right  Radial deviation - 5/5 on left and 5/5 on right  Pronation - 5/5 on left and 5/5 on right  Supination - 5/5 on left and 5/5 on right    Finger flexion - 5/5 on  left and 4/5 on right  Finger extension - 5/5 on left and 4/5 on right  Finger abduction - 5/5 on left and 4/5 on right  Finger adduction - 5/5 on left and 4/5 on right   strength - 5/5 on left and 4/5 on right    Thumb flexion - 5/5 on left and 5/5 on right  Thumb extension - 5/5 on left and 5/5 on right  Thumb abduction - 5/5 on left and 5/5 on right  Thumb adduction - 5/5 on left and 5/5 on right  Thumb opposition - 5/5 on left and 5/5 on right    Special Tests:  Phalen's test - negative  Tinel's test at wrist - negative  Carpal compression test - negative    Ulnar compression test - negative    No laxity with distal radioulnar joint translation.  Negative Morgans test.     No laxity with phalangeal varus/valgus stress testing.    Axial grind of first CMC joint - negative  No laxity at the MCP UCL of the thumb    Normal fist alignment of the phalanges  No laxity at the RCL and UCL of the PIPs and DIPs of the phalanges.  Normal finger flexion of the FDP and FDS in all phalanges bilaterally.  Able to perform OK sign.    Neurovascular Exam:  Sensation intact to light touch in the median, ulnar, and radial distributions on the hands bilaterally.  Radial and ulnar pulses intact and symmetric.  Capillary refill intact to <2 seconds in all digits.        ASSESSMENT:      ICD-10-CM ICD-9-CM   1. Right hand pain M79.641 729.5   2. Heberden's nodes M15.1 715.04   3. Quita's nodes M15.2 715.04       PLAN:  1-3.  Right hand pain/Heberden's nodes/Quita's nodes -     - Abraham admits to right hand pain, weakness, and decreased range of motion.  Symptoms became more noticeable approximately 1 week ago when preparing food in the kitchen.  Since that time, he has noticed decreased  strength due to pain and overall stiffness in his fingers.  He has been unable to golf over the past several weeks due to back and hip issues, but as those have resolved his  strength is still affected and has negatively affected his  ability to golf.  He has been taking Aleve which does help.    - We reviewed the natural history of osteoarthritis and a multipronged treatment approach. We reviewed the importance of addressing three different aspects to best manage this condition. Controlling the intra-articular immune reaction through medications and/or injections, improving local stability through bracing and/or injection, and improving functional stability through hip, core, and ankle strength, stability and mobility which may benefit from formal physical therapy.    - Bilateral hand x-rays have been ordered.  Will call with the results.    - HEP for hand arthritis and putty exercises prescribed today. Handout provided, explained, and exercises were demonstrated as needed. Encouraged to do daily.    - Mobic 15 mg daily for 2 weeks followed by as needed.  Also advised icing to the affected area once to twice daily.      Future planning includes - occupational therapy if not improving    All questions were answered to the best of my ability and all concerns were addressed at this time.    Follow up in 4-6 weeks for above if needed.      This note is dictated using the M*Modal Fluency Direct word recognition program. There are word recognition mistakes that are occasionally missed on review.      As per the guidelines from [place], this patient's condition is considered time sensitive.  The visit could not be done via telemedicine. Treatment performed during this visit was medically necessary is to avoid further harm to the patient's underlying condition.

## 2020-05-13 ENCOUNTER — PATIENT MESSAGE (OUTPATIENT)
Dept: UROLOGY | Facility: CLINIC | Age: 70
End: 2020-05-13

## 2020-05-19 ENCOUNTER — PATIENT MESSAGE (OUTPATIENT)
Dept: ORTHOPEDICS | Facility: CLINIC | Age: 70
End: 2020-05-19

## 2020-05-26 DIAGNOSIS — M25.541 ARTHRALGIA OF BOTH HANDS: ICD-10-CM

## 2020-05-26 DIAGNOSIS — I10 ESSENTIAL HYPERTENSION: ICD-10-CM

## 2020-05-26 DIAGNOSIS — M25.542 ARTHRALGIA OF BOTH HANDS: ICD-10-CM

## 2020-05-26 RX ORDER — DICLOFENAC SODIUM 10 MG/G
2 GEL TOPICAL 2 TIMES DAILY PRN
Qty: 100 G | Refills: 1 | Status: SHIPPED | OUTPATIENT
Start: 2020-05-26 | End: 2021-03-02

## 2020-05-26 RX ORDER — OLMESARTAN MEDOXOMIL 40 MG/1
40 TABLET ORAL DAILY
Qty: 90 TABLET | Refills: 1 | Status: SHIPPED | OUTPATIENT
Start: 2020-05-26 | End: 2020-09-01

## 2020-05-26 NOTE — TELEPHONE ENCOUNTER
Pt would like for you to look over his medications to see if any of them can be causing his muscle soreness. He states they only new medication he has started taking is the Avodart.     lov 1/23/20    Upcoming appt 7/22 @ 8:20 am

## 2020-06-03 ENCOUNTER — PATIENT OUTREACH (OUTPATIENT)
Dept: ADMINISTRATIVE | Facility: OTHER | Age: 70
End: 2020-06-03

## 2020-06-08 ENCOUNTER — OFFICE VISIT (OUTPATIENT)
Dept: ORTHOPEDICS | Facility: CLINIC | Age: 70
End: 2020-06-08
Payer: MEDICARE

## 2020-06-08 VITALS
SYSTOLIC BLOOD PRESSURE: 148 MMHG | WEIGHT: 243 LBS | DIASTOLIC BLOOD PRESSURE: 80 MMHG | BODY MASS INDEX: 32.91 KG/M2 | HEIGHT: 72 IN

## 2020-06-08 DIAGNOSIS — G89.29 CHRONIC PAIN OF BOTH KNEES: Primary | ICD-10-CM

## 2020-06-08 DIAGNOSIS — M19.042 PRIMARY OSTEOARTHRITIS OF BOTH HANDS: ICD-10-CM

## 2020-06-08 DIAGNOSIS — M19.041 PRIMARY OSTEOARTHRITIS OF BOTH HANDS: ICD-10-CM

## 2020-06-08 DIAGNOSIS — M25.562 CHRONIC PAIN OF BOTH KNEES: Primary | ICD-10-CM

## 2020-06-08 DIAGNOSIS — M25.561 CHRONIC PAIN OF BOTH KNEES: Primary | ICD-10-CM

## 2020-06-08 PROCEDURE — 99214 PR OFFICE/OUTPT VISIT, EST, LEVL IV, 30-39 MIN: ICD-10-PCS | Mod: S$GLB,,, | Performed by: ORTHOPAEDIC SURGERY

## 2020-06-08 PROCEDURE — 99214 OFFICE O/P EST MOD 30 MIN: CPT | Mod: S$GLB,,, | Performed by: ORTHOPAEDIC SURGERY

## 2020-06-08 PROCEDURE — 99999 PR PBB SHADOW E&M-EST. PATIENT-LVL III: CPT | Mod: PBBFAC,,, | Performed by: ORTHOPAEDIC SURGERY

## 2020-06-08 PROCEDURE — 99999 PR PBB SHADOW E&M-EST. PATIENT-LVL III: ICD-10-PCS | Mod: PBBFAC,,, | Performed by: ORTHOPAEDIC SURGERY

## 2020-06-08 NOTE — PROGRESS NOTES
"CC: right hand pain    Bill is here today for follow up evaluation of his right hand pain. Patient states his pain is 3/10 today. Patient states he took Mobic 15 mg as prescribed for two weeks and did not appreciate an improvement in his pain with this treatment. He admits to switching to Alevel and Tylenol arthritis and believes this has been helpful towards reducing his pain. Patient states he has been performing his HEP daily and believes this has helped to improve his hand range of motion slightly. Patient indicates he is still unable to make a fist with his right hand. He states he is now able to make a fist with his left hand, although this is a painful activity. Patient reports he is sill unable to golf at this time. Patient also expressed concern over right knee pain and swelling. He denies mechanism of injury to this area but feels as though there is "water on the knee."     Recall from visit on 5/11/2020  69 y.o. Male presents today for evaluation of his right hand pain. Patient admits to right hand pain beginning approximately one week ago while making yesenia slaw. He recalls gripping a kitchen tool when making the yesenia slaw and believes this is what has contributed to his increased pain and stiffness. Patient states he has noticed decreased  strength in addition to decreased range of motion of the right hand. He admits to this complaint improving throughout the day, and being worst first thing in the morning. He denies recent falls or trauma.   How long: Patient admits to right hand pain for the past several days.  Symptoms have been slowly improving, but he has been unable to  a golf club due to the pain.  What makes it better: Patient reports his pain improves throughout the course of the day.   What makes it worse: Patient reports increased pain with gripping activities.   Does it radiate: Denies radiating pain.   Attempted treatments: Patient states he has been taking Tylenol as needed and will " occasionally take Aleve which he feels works better towards improving his pain. He has also been applying sodium cream as needed. Denies formal physical therapy to address his hand pain. Denies the application of ice or heat. Denies prior history of injection or surgery into either hand.   Pain score: 2/10  Any mechanical symptoms: Denies mechanical symptoms.  Feelings of instability: Denies feelings of instability.   Affecting ADL: Patient admits to this problem affecting his ability to perform ADLs.     REVIEW OF SYSTEMS:   Constitution: Patient denies fever, chills, night sweats, and weight changes.  Eyes: Patient denies eye pain or vision changes.  HENT: Patient denies headache, ear pain, sore throat, or nasal discharge.  CVS: Patient denies chest pain.  Lungs: Patient denies shortness of breath or cough.  Abd: Patient denies stomach pain, nausea, or vomiting.  Skin: Patient denies skin rash or itching.    Hematologic/Lymphatic: Patient denies easy bruising.   Musculoskeletal: Patient denies any falls. See HPI.  Psych: Patient denies any current anxiety or nervousness.    PAST MEDICAL HISTORY:   Past Medical History:   Diagnosis Date    CAD (coronary artery disease)     cabg    Disorder of kidney and ureter     Hyperlipidemia     Hypertension     Plantar fasciitis     Sleep apnea 2005    cpap, 2005    Urinary tract infection        PAST SURGICAL HISTORY:   Past Surgical History:   Procedure Laterality Date    CORONARY ARTERY BYPASS GRAFT  2007       FAMILY HISTORY:   Family History   Problem Relation Age of Onset    Depression Mother     Diabetes Mother     Cancer Father         prostate    Hypertension Brother     Heart disease Maternal Grandfather     Prostate cancer Neg Hx     Kidney disease Neg Hx        SOCIAL HISTORY:   Social History     Socioeconomic History    Marital status:      Spouse name: Not on file    Number of children: Not on file    Years of education: Not on file     Highest education level: Not on file   Occupational History    Not on file   Social Needs    Financial resource strain: Not hard at all    Food insecurity:     Worry: Never true     Inability: Never true    Transportation needs:     Medical: No     Non-medical: No   Tobacco Use    Smoking status: Former Smoker    Smokeless tobacco: Never Used   Substance and Sexual Activity    Alcohol use: Yes     Frequency: 2-4 times a month     Binge frequency: Never     Comment: socially    Drug use: Never    Sexual activity: Yes     Partners: Female   Lifestyle    Physical activity:     Days per week: 1 day     Minutes per session: 30 min    Stress: Only a little   Relationships    Social connections:     Talks on phone: Three times a week     Gets together: Three times a week     Attends [employer] service: Not on file     Active member of club or organization: Yes     Attends meetings of clubs or organizations: More than 4 times per year     Relationship status:    Other Topics Concern    Not on file   Social History Narrative    Not on file       MEDICATIONS:     Current Outpatient Medications:     amLODIPine (NORVASC) 10 MG tablet, Take 1 tablet (10 mg total) by mouth every evening., Disp: 30 tablet, Rfl: 1    aspirin 162.5 mg Cp24, Take 81 mg by mouth., Disp: , Rfl:     co-enzyme Q-10 30 mg capsule, Take 30 mg by mouth 3 (three) times daily., Disp: , Rfl:     diclofenac sodium (VOLTAREN) 1 % Gel, Apply 2 g topically 2 (two) times daily as needed., Disp: 100 g, Rfl: 1    dutasteride (AVODART) 0.5 mg capsule, Take 1 capsule (0.5 mg total) by mouth once daily., Disp: 90 capsule, Rfl: 3    finasteride (PROSCAR) 5 mg tablet, Take 1 tablet (5 mg total) by mouth once daily., Disp: 30 tablet, Rfl: 11    flaxseed oil 1,000 mg Cap, Take by mouth., Disp: , Rfl:     hydroCHLOROthiazide (MICROZIDE) 12.5 mg capsule, Take 1 capsule (12.5 mg total) by mouth once daily., Disp: 90 capsule, Rfl: 0    olmesartan  (BENICAR) 40 MG tablet, Take 1 tablet (40 mg total) by mouth once daily., Disp: 90 tablet, Rfl: 1    rosuvastatin (CRESTOR) 20 MG tablet, Take 1 tablet (20 mg total) by mouth every evening., Disp: 90 tablet, Rfl: 1    vitamin D (VITAMIN D3) 1000 units Tab, Take 185 mg by mouth 2 (two) times daily., Disp: , Rfl:     ALLERGIES:   Review of patient's allergies indicates:   Allergen Reactions    Lisinopril Other (See Comments)     Hearing loss        PHYSICAL EXAMINATION:  BP (!) 148/80   Ht 6' (1.829 m)   Wt 110.2 kg (243 lb)   BMI 32.96 kg/m²   Vitals signs and nursing note have been reviewed.  General: In no acute distress, well developed, well nourished, no diaphoresis  Eyes: EOM full and smooth, no eye redness or discharge  HENT: normocephalic and atraumatic, neck supple, trachea midline, no nasal discharge, no external ear redness or discharge  Cardiovascular: 2+ and symmetric radial pulses bilaterally, no LE edema  Lungs: respirations non-labored, no conversational dyspnea   Abd: non-distended, no rigidity  MSK: no amputation or deformity, no swelling of extremities  Neuro: AAOx3, CN2-12 grossly intact  Skin: No rashes, warm and dry  Psychiatric: cooperative, pleasant, mood and affect appropriate for age    HAND: RIGHT  The affected hand is compared to the contralateral hand .    Observation:  No evidence of edema, erythema, ecchymosis, or effusion.  No asymmetries; muscle atrophy; ganglion cysts; or bony abnormalities including ulnar deviation.  + Heberdens and Brouchards nodes.  No swan-neck or boutonnière deformities.    No clubbing of the digits.    Tenderness:  No tenderness at radial styloid, Júnior's tubercle, ulnar styloid.  No tenderness at anatomic snuff box, scaphoid tubercle, scapholunate junction.  No tenderness at TFCC.  No tenderness at pisiform, hamate, metacarpals and phalanges.  No tenderness over median nerve at the carpal tunnel.  + tenderness along the hypothenar eminence    Range of  Motion:  Active wrist extension to 70° on left and 70° on right.    Active wrist flexion to 80° on left and 80° on right.    Active radial deviation to 20° on left and 20° on right.    Active ulnar deviation to 30° on left and 30° on right.    Active pronation to 80° on left and 80° on right.    Active supination to 80° on left and 80° on right.    Full active flexion and extension at the MCP, PIP, and DIP bilaterally.   Active thumb motion full in all planes.    Strength Testing:  Wrist extension - 5/5 on left and 5/5 on right  Wrist flexion - 5/5 on left and 5/5 on right  Ulnar deviation - 5/5 on left and 5/5 on right  Radial deviation - 5/5 on left and 5/5 on right  Pronation - 5/5 on left and 5/5 on right  Supination - 5/5 on left and 5/5 on right    Finger flexion - 5/5 on left and 4/5 on right  Finger extension - 5/5 on left and 4/5 on right  Finger abduction - 5/5 on left and 4/5 on right  Finger adduction - 5/5 on left and 4/5 on right   strength - 5/5 on left and 4/5 on right    Thumb flexion - 5/5 on left and 5/5 on right  Thumb extension - 5/5 on left and 5/5 on right  Thumb abduction - 5/5 on left and 5/5 on right  Thumb adduction - 5/5 on left and 5/5 on right  Thumb opposition - 5/5 on left and 5/5 on right    Special Tests:  Phalen's test - negative  Tinel's test at wrist - negative  Carpal compression test - negative    Ulnar compression test - negative    No laxity with distal radioulnar joint translation.  Negative Morgans test.     No laxity with phalangeal varus/valgus stress testing.    Axial grind of first CMC joint - negative  No laxity at the MCP UCL of the thumb    Normal fist alignment of the phalanges  No laxity at the RCL and UCL of the PIPs and DIPs of the phalanges.  Normal finger flexion of the FDP and FDS in all phalanges bilaterally.  Able to perform OK sign.    Neurovascular Exam:  Sensation intact to light touch in the median, ulnar, and radial distributions on the hands  "bilaterally.  Radial and ulnar pulses intact and symmetric.  Capillary refill intact to <2 seconds in all digits.      1. X-ray ordered due to bilateral hand pain   2. X-ray images were reviewed personally by me and then directly with patient.  3. FINDINGS: X-ray images obtained demonstrate mild loss of joint space at the 1st carpometacarpal joint. There is loss of joint space involving the distal interphalangeal joints of the 2nd and 3rd digits on the left and 2nd, 3rd, and 5th digits on the right there is no fracture or dislocation.  4. IMPRESSION: As above.     ASSESSMENT:      ICD-10-CM ICD-9-CM   1. Chronic pain of both knees M25.561 719.46    M25.562 338.29    G89.29    2. Primary osteoarthritis of both hands M19.041 715.14    M19.042        PLAN:  1. Bilateral knee pain - new to provider    - Abraham admits to bilateral knee pain, right greater than left starting over the past few weeks.  He states that he feels as though there is "swelling on the knee".  He denies any specific injury or trauma but does state that he has had knee pain like this in the past.  He has noticed an increase in his pain when eating more wheat and feels as though this is contributing to inflammation.    - X-rays have been ordered and we will discuss results at next visit.    - Anti-inflammatories for the hands will likely help the knees.  Advised him to decrease his we had included in intake over the next 2 weeks to see if that helps with his overall pain and inflammation and fatigue.      2.  Bilateral hand osteoarthritis -  Minimally improved    - Recall that Abraham admits to right hand pain, weakness, and decreased range of motion.  Symptoms became more noticeable approximately 2 months ago when preparing food in the kitchen.  Since that time, he has noticed decreased  strength due to pain and overall stiffness in his fingers.  He has tried Mobic but did not notice much improvement from the medication.  He has been taking Tylenol " with better results and he has found improvement in his left hand from the putty exercises    - We reviewed the natural history of osteoarthritis and a multipronged treatment approach. We reviewed the importance of addressing three different aspects to best manage this condition. Controlling the intra-articular immune reaction through medications and/or injections, improving local stability through bracing and/or injection, and improving functional stability through hip, core, and ankle strength, stability and mobility which may benefit from formal physical therapy.    - XRs obtained 5/11/2020 and images were personally reviewed with the patient. See above for further detail.    - Continue with HEP for hand arthritis and putty exercises daily.   Message sent to his physical therapist to see if we can update his prescription and get him back into therapy for his hands as well.    - Continue with Tylenol but was advised to not exceed 3000 mg daily.  Continue with Voltaren gel to the hands and knees      Future planning includes -  Return to physical therapy, consider knee CSI pending XR results    All questions were answered to the best of my ability and all concerns were addressed at this time.    Follow up in 2-3 weeks for above if needed.      This note is dictated using the M*Modal Fluency Direct word recognition program. There are word recognition mistakes that are occasionally missed on review.

## 2020-06-09 DIAGNOSIS — M19.041 PRIMARY OSTEOARTHRITIS OF BOTH HANDS: Primary | ICD-10-CM

## 2020-06-09 DIAGNOSIS — M19.042 PRIMARY OSTEOARTHRITIS OF BOTH HANDS: Primary | ICD-10-CM

## 2020-06-10 PROBLEM — M79.642 BILATERAL HAND PAIN: Status: ACTIVE | Noted: 2020-06-10

## 2020-06-10 PROBLEM — M79.641 BILATERAL HAND PAIN: Status: ACTIVE | Noted: 2020-06-10

## 2020-06-16 PROBLEM — M79.641 PAIN IN BOTH HANDS: Status: ACTIVE | Noted: 2020-06-16

## 2020-06-16 PROBLEM — M79.642 PAIN IN BOTH HANDS: Status: ACTIVE | Noted: 2020-06-16

## 2020-06-16 PROBLEM — M79.643 CHRONIC HAND PAIN: Status: ACTIVE | Noted: 2020-06-10

## 2020-06-16 PROBLEM — G89.29 CHRONIC HAND PAIN: Status: ACTIVE | Noted: 2020-06-10

## 2020-06-22 ENCOUNTER — OFFICE VISIT (OUTPATIENT)
Dept: ORTHOPEDICS | Facility: CLINIC | Age: 70
End: 2020-06-22
Payer: MEDICARE

## 2020-06-22 VITALS
HEIGHT: 72 IN | SYSTOLIC BLOOD PRESSURE: 160 MMHG | BODY MASS INDEX: 32.91 KG/M2 | DIASTOLIC BLOOD PRESSURE: 70 MMHG | WEIGHT: 243 LBS

## 2020-06-22 DIAGNOSIS — M19.042 PRIMARY OSTEOARTHRITIS OF BOTH HANDS: Primary | ICD-10-CM

## 2020-06-22 DIAGNOSIS — M25.511 ACUTE PAIN OF RIGHT SHOULDER: ICD-10-CM

## 2020-06-22 DIAGNOSIS — M75.41 IMPINGEMENT SYNDROME OF RIGHT SHOULDER: ICD-10-CM

## 2020-06-22 DIAGNOSIS — M25.562 CHRONIC PAIN OF BOTH KNEES: ICD-10-CM

## 2020-06-22 DIAGNOSIS — M25.40 JOINT SWELLING: ICD-10-CM

## 2020-06-22 DIAGNOSIS — M25.561 CHRONIC PAIN OF BOTH KNEES: ICD-10-CM

## 2020-06-22 DIAGNOSIS — M79.10 MYALGIA: ICD-10-CM

## 2020-06-22 DIAGNOSIS — G89.29 CHRONIC PAIN OF BOTH KNEES: ICD-10-CM

## 2020-06-22 DIAGNOSIS — M19.041 PRIMARY OSTEOARTHRITIS OF BOTH HANDS: Primary | ICD-10-CM

## 2020-06-22 PROCEDURE — 99214 PR OFFICE/OUTPT VISIT, EST, LEVL IV, 30-39 MIN: ICD-10-PCS | Mod: S$GLB,,, | Performed by: ORTHOPAEDIC SURGERY

## 2020-06-22 PROCEDURE — 99999 PR PBB SHADOW E&M-EST. PATIENT-LVL III: ICD-10-PCS | Mod: PBBFAC,,, | Performed by: ORTHOPAEDIC SURGERY

## 2020-06-22 PROCEDURE — 99214 OFFICE O/P EST MOD 30 MIN: CPT | Mod: S$GLB,,, | Performed by: ORTHOPAEDIC SURGERY

## 2020-06-22 PROCEDURE — 99999 PR PBB SHADOW E&M-EST. PATIENT-LVL III: CPT | Mod: PBBFAC,,, | Performed by: ORTHOPAEDIC SURGERY

## 2020-06-22 RX ORDER — PREDNISONE 10 MG/1
40 TABLET ORAL DAILY
Qty: 20 TABLET | Refills: 0 | Status: SHIPPED | OUTPATIENT
Start: 2020-06-22 | End: 2020-07-14 | Stop reason: DRUGHIGH

## 2020-06-22 RX ORDER — TRIAMCINOLONE ACETONIDE 40 MG/ML
40 INJECTION, SUSPENSION INTRA-ARTICULAR; INTRAMUSCULAR
Status: DISCONTINUED | OUTPATIENT
Start: 2020-06-22 | End: 2022-01-12

## 2020-06-22 NOTE — PROGRESS NOTES
"CC: right hand pain    Abraham is here today for follow up evaluation of his right hand pain, right knee pain and right shoulder pain. Patient states he feels as though his pain has remained unchanged at this time. Patient indicates his largest complaint today is his right shoulder.  He is having difficulty lifting his arm above his head and states that internal and external rotation are causing him great pain. Patient states he feels as though his right knee has improved and he is feeling less swollen today. He states he has also started to wear a knee sleeve consistently and feels as though this has been helpful for him. He states he has started physical therapy and occupational therapy and has not appreciated improvement in his symptoms yet. He is wondering if there is anything that can be done to help with his "traveling pain".     Recall form visit on 06/08/2020  Bill is here today for follow up evaluation of his right hand pain. Patient states his pain is 3/10 today. Patient states he took Mobic 15 mg as prescribed for two weeks and did not appreciate an improvement in his pain with this treatment. He admits to switching to Alevel and Tylenol arthritis and believes this has been helpful towards reducing his pain. Patient states he has been performing his HEP daily and believes this has helped to improve his hand range of motion slightly. Patient indicates he is still unable to make a fist with his right hand. He states he is now able to make a fist with his left hand, although this is a painful activity. Patient reports he is sill unable to golf at this time. Patient also expressed concern over right knee pain and swelling. He denies mechanism of injury to this area but feels as though there is "water on the knee."     Recall from visit on 5/11/2020  70 y.o. Male presents today for evaluation of his right hand pain. Patient admits to right hand pain beginning approximately one week ago while making yesenia slaw. He " recalls gripping a kitchen tool when making the yesenia slaw and believes this is what has contributed to his increased pain and stiffness. Patient states he has noticed decreased  strength in addition to decreased range of motion of the right hand. He admits to this complaint improving throughout the day, and being worst first thing in the morning. He denies recent falls or trauma.   How long: Patient admits to right hand pain for the past several days.  Symptoms have been slowly improving, but he has been unable to  a golf club due to the pain.  What makes it better: Patient reports his pain improves throughout the course of the day.   What makes it worse: Patient reports increased pain with gripping activities.   Does it radiate: Denies radiating pain.   Attempted treatments: Patient states he has been taking Tylenol as needed and will occasionally take Aleve which he feels works better towards improving his pain. He has also been applying sodium cream as needed. Denies formal physical therapy to address his hand pain. Denies the application of ice or heat. Denies prior history of injection or surgery into either hand.   Pain score: 2/10  Any mechanical symptoms: Denies mechanical symptoms.  Feelings of instability: Denies feelings of instability.   Affecting ADL: Patient admits to this problem affecting his ability to perform ADLs.     REVIEW OF SYSTEMS:   Constitution: Patient denies fever, chills, night sweats, and weight changes.  Eyes: Patient denies eye pain or vision changes.  HENT: Patient denies headache, ear pain, sore throat, or nasal discharge.  CVS: Patient denies chest pain.  Lungs: Patient denies shortness of breath or cough.  Abd: Patient denies stomach pain, nausea, or vomiting.  Skin: Patient denies skin rash or itching.    Hematologic/Lymphatic: Patient denies easy bruising.   Musculoskeletal: Patient denies any falls. See HPI.  Psych: Patient denies any current anxiety or  nervousness.    PAST MEDICAL HISTORY:   Past Medical History:   Diagnosis Date    CAD (coronary artery disease)     cabg    Disorder of kidney and ureter     Hyperlipidemia     Hypertension     Plantar fasciitis     Sleep apnea 2005    cpap, 2005    Urinary tract infection        PAST SURGICAL HISTORY:   Past Surgical History:   Procedure Laterality Date    CORONARY ARTERY BYPASS GRAFT  2007       FAMILY HISTORY:   Family History   Problem Relation Age of Onset    Depression Mother     Diabetes Mother     Cancer Father         prostate    Hypertension Brother     Heart disease Maternal Grandfather     Prostate cancer Neg Hx     Kidney disease Neg Hx        SOCIAL HISTORY:   Social History     Socioeconomic History    Marital status:      Spouse name: Not on file    Number of children: Not on file    Years of education: Not on file    Highest education level: Not on file   Occupational History    Not on file   Social Needs    Financial resource strain: Not hard at all    Food insecurity     Worry: Never true     Inability: Never true    Transportation needs     Medical: No     Non-medical: No   Tobacco Use    Smoking status: Former Smoker    Smokeless tobacco: Never Used   Substance and Sexual Activity    Alcohol use: Yes     Frequency: 2-4 times a month     Binge frequency: Never     Comment: socially    Drug use: Never    Sexual activity: Yes     Partners: Female   Lifestyle    Physical activity     Days per week: 1 day     Minutes per session: 30 min    Stress: Only a little   Relationships    Social connections     Talks on phone: Three times a week     Gets together: Three times a week     Attends Restoration service: Not on file     Active member of club or organization: Yes     Attends meetings of clubs or organizations: More than 4 times per year     Relationship status:    Other Topics Concern    Not on file   Social History Narrative    Not on file        MEDICATIONS:     Current Outpatient Medications:     amLODIPine (NORVASC) 10 MG tablet, Take 1 tablet (10 mg total) by mouth every evening., Disp: 30 tablet, Rfl: 1    aspirin 162.5 mg Cp24, Take 81 mg by mouth., Disp: , Rfl:     co-enzyme Q-10 30 mg capsule, Take 30 mg by mouth 3 (three) times daily., Disp: , Rfl:     diclofenac sodium (VOLTAREN) 1 % Gel, Apply 2 g topically 2 (two) times daily as needed., Disp: 100 g, Rfl: 1    dutasteride (AVODART) 0.5 mg capsule, Take 1 capsule (0.5 mg total) by mouth once daily., Disp: 90 capsule, Rfl: 3    finasteride (PROSCAR) 5 mg tablet, Take 1 tablet (5 mg total) by mouth once daily., Disp: 30 tablet, Rfl: 11    flaxseed oil 1,000 mg Cap, Take by mouth., Disp: , Rfl:     hydroCHLOROthiazide (MICROZIDE) 12.5 mg capsule, Take 1 capsule (12.5 mg total) by mouth once daily., Disp: 90 capsule, Rfl: 0    olmesartan (BENICAR) 40 MG tablet, Take 1 tablet (40 mg total) by mouth once daily., Disp: 90 tablet, Rfl: 1    predniSONE (DELTASONE) 10 MG tablet, Take 4 tablets (40 mg total) by mouth once daily., Disp: 20 tablet, Rfl: 0    rosuvastatin (CRESTOR) 20 MG tablet, Take 1 tablet (20 mg total) by mouth every evening., Disp: 90 tablet, Rfl: 1    vitamin D (VITAMIN D3) 1000 units Tab, Take 185 mg by mouth 2 (two) times daily., Disp: , Rfl:     Current Facility-Administered Medications:     triamcinolone acetonide injection 40 mg, 40 mg, INTRABURSAL, 1 time in Clinic/HOD, Saad Obrien DO    ALLERGIES:   Review of patient's allergies indicates:   Allergen Reactions    Lisinopril Other (See Comments)     Hearing loss        PHYSICAL EXAMINATION:  BP (!) 160/70   Ht 6' (1.829 m)   Wt 110.2 kg (243 lb)   BMI 32.96 kg/m²   Vitals signs and nursing note have been reviewed.  General: In no acute distress, well developed, well nourished, no diaphoresis  Eyes: EOM full and smooth, no eye redness or discharge  HENT: normocephalic and atraumatic, neck supple, trachea  midline, no nasal discharge, no external ear redness or discharge  Cardiovascular: 2+ and symmetric radial pulses bilaterally, no LE edema  Lungs: respirations non-labored, no conversational dyspnea   Abd: non-distended, no rigidity  MSK: no amputation or deformity, no swelling of extremities  Neuro: AAOx3, CN2-12 grossly intact  Skin: No rashes, warm and dry  Psychiatric: cooperative, pleasant, mood and affect appropriate for age    HAND: RIGHT  The affected hand is compared to the contralateral hand .    Observation:  No evidence of edema, erythema, ecchymosis, or effusion.  No asymmetries; muscle atrophy; ganglion cysts; or bony abnormalities including ulnar deviation.  + Heberdens and Brouchards nodes.  No swan-neck or boutonnière deformities.    No clubbing of the digits.    Tenderness:  No tenderness at radial styloid, Júnior's tubercle, ulnar styloid.  No tenderness at anatomic snuff box, scaphoid tubercle, scapholunate junction.  No tenderness at TFCC.  No tenderness at pisiform, hamate, metacarpals and phalanges.  No tenderness over median nerve at the carpal tunnel.  + tenderness along the hypothenar eminence    Range of Motion:  Active wrist extension to 70° on left and 70° on right.    Active wrist flexion to 80° on left and 80° on right.    Active radial deviation to 20° on left and 20° on right.    Active ulnar deviation to 30° on left and 30° on right.    Active pronation to 80° on left and 80° on right.    Active supination to 80° on left and 80° on right.    Full active flexion and extension at the MCP, PIP, and DIP bilaterally.   Active thumb motion full in all planes.    Strength Testing:  Wrist extension - 5/5 on left and 5/5 on right  Wrist flexion - 5/5 on left and 5/5 on right  Ulnar deviation - 5/5 on left and 5/5 on right  Radial deviation - 5/5 on left and 5/5 on right  Pronation - 5/5 on left and 5/5 on right  Supination - 5/5 on left and 5/5 on right    Finger flexion - 5/5 on left and  4/5 on right  Finger extension - 5/5 on left and 4/5 on right  Finger abduction - 5/5 on left and 4/5 on right  Finger adduction - 5/5 on left and 4/5 on right   strength - 5/5 on left and 4/5 on right    Thumb flexion - 5/5 on left and 5/5 on right  Thumb extension - 5/5 on left and 5/5 on right  Thumb abduction - 5/5 on left and 5/5 on right  Thumb adduction - 5/5 on left and 5/5 on right  Thumb opposition - 5/5 on left and 5/5 on right    Special Tests:  Phalen's test - negative  Tinel's test at wrist - negative  Carpal compression test - negative    Ulnar compression test - negative    No laxity with distal radioulnar joint translation.  Negative Morgans test.     No laxity with phalangeal varus/valgus stress testing.    Axial grind of first CMC joint - negative  No laxity at the MCP UCL of the thumb    Normal fist alignment of the phalanges  No laxity at the RCL and UCL of the PIPs and DIPs of the phalanges.  Normal finger flexion of the FDP and FDS in all phalanges bilaterally.  Able to perform OK sign.    Shoulder: right  The affected shoulder is compared to the contralateral shoulder.    Observation:    CERVICAL SPINE  Normal head carriage. Normal thoracic kyphosis.  Full AROM in flexion, extension, sidebending, and rotation.    SHOULDER  No ecchymosis, edema, or erythema throughout the shoulder girdle.  No sternal, clavicular, or acromial deformities bilaterally.  No atrophy of the pectorals, deltoids, supraspinatus, infraspinatus, or biceps bilaterally.  No asymmetry of shoulders bilaterally.    Tenderness:  No tenderness at the SC or AC joint  No tenderness over the clavicle   No tenderness over biceps tendon or bicipital groove  + tenderness over subacromial space    Strength Testing:  Deltoid - 5/5  Biceps - 5/5  Triceps - 5/5  Wrist extension - 5/5  Wrist flexion - 5/5   - 5/5  Finger extension - 5/5  Finger abduction - 5/5    Special Tests:  Spurlings test - negative  Lhermittes test -  negative    Empty can test - negative for weakness, positive for pain  Full can test - negative for weakness, positive for pain  Bear hug test - negative for weakness, positive for pain  Belly press test - negative for weakness, positive for pain  Resisted internal rotation - negative for weakness, positive for pain  Resisted external rotation - negative for weakness, positive for pain    Neer's test - positive  Hawkin's-Nilo test - positive    OCalvins test - positive    Speed's test - negative  Yergason's test - negative    Sulcus sign - none  AP load and shift laxity - none      Neurovascular Exam:  Sensation intact to light touch in the median, ulnar, and radial distributions on the hands bilaterally.  Radial and ulnar pulses intact and symmetric.  Capillary refill intact to <2 seconds in all digits.        IMAGIN. X-ray ordered due to bilateral knee pain   2. X-ray images were reviewed personally by me and then directly with patient.  3. FINDINGS: X-ray images obtained demonstrate there is osseous spur at the superior aspect of the patella bilaterally.  There is no fracture, dislocation, or bony erosion.  There is moderate medial compartment joint space loss bilaterally.  4. IMPRESSION: As above.       PROCEDURE:  Large Joint Aspiration/Injection  Subacromial bursa, right  Performed by: MIKE KNUTSON.  Authorized by: MIKE KNUTSON  Consent Done?: Yes (Verbal and written)  Indications: Pain  Site marked: The procedure site was marked   Timeout: Prior to procedure the correct patient, procedure, and site was verified     Location: Subacromial bursa, right  Prep: Prep: Patient was prepped with Chlorhexidine and alcohol.  Skin anesthetic: Ethyl Chloride spray was used prior to skin puncture.  Ultrasound guidance for needle placement: yes  Needle size: 22 G, 2.5  Approach: Lateral  Procedure: After skin anesthetic was applied, the 22G, 2.5 needle was used to enter the subacromial bursa under US  guidance. A 3 cc mixture of 1 cc of 40 mg/ml triamcinolone acetonide and 2 cc of 0.25% bupivacaine was injected into the bursa.   Medications: 40 mg triamcinolone acetonide 40 mg/mL  Patient tolerance: Patient tolerated the procedure well with no immediate complications    Ultrasound guidance was used for needle localization. Images were saved and stored for documentation. The shoulder structures were well visualized during the procedure. Direct visualization of the 22g x 2.5 needles was continuous throughout the procedures.    Triamcinolone:  NDC: [address]-5  LOT: [id number]  EXP: 11/2021    ASSESSMENT:      ICD-10-CM ICD-9-CM   1. Primary osteoarthritis of both hands  M19.041 715.14    M19.042    2. Acute pain of right shoulder  M25.511 719.41   3. Impingement syndrome of right shoulder  M75.41 726.2   4. Joint swelling  M25.40 719.00   5. Myalgia  M79.10 729.1   6. Chronic pain of both knees  M25.561 719.46    M25.562 338.29    G89.29        PLAN:    1.  Bilateral hand osteoarthritis -     - Recall that Abraham admits to right hand pain, weakness, and decreased range of motion.  Symptoms became more noticeable approximately 2 months ago when preparing food in the kitchen.  Since that time, he has noticed decreased  strength due to pain and overall stiffness in his fingers.  He has tried Mobic but did not notice much improvement from the medication.  He has been taking Tylenol with better results and he has found improvement in his left hand from the putty exercises.    - We reviewed the natural history of osteoarthritis and a multipronged treatment approach. We reviewed the importance of addressing three different aspects to best manage this condition. Controlling the intra-articular immune reaction through medications and/or injections, improving local stability through bracing and/or injection, and improving functional stability through hip, core, and ankle strength, stability and mobility which may benefit  from formal physical therapy.    - Continue with OT and home exercises.    - Continue with Tylenol as prescribed.      2-3. Acute pain of right shoulder/impingement -  New to provider    -   Since his last visit he has had increasing pain in his right shoulder as well as decreased range of motion. Exam is consistent with subacromial bursitis/impingement and we discussed options for this today.    -  Subacromial bursa done under ultrasound guidance. See above for procedure detail.      4-5. Joint swelling/myalgia -     - Because of the persistent symptoms I recommend obtaining inflammatory labs. Will call with the results.  Possible polymyalgia rheumatica.    - Prednisone 40 mg daily for 5 days prescribed at this time.  Pending labs and response to medicine, we may consider a long-term taper of this.      6.  Bilateral knee pain -  improved    - XR images obtained on 06/08/2020 were personally reviewed with the patient. See above for further detail.    - Continue with compression sleeve knee brace for the right knee.      Future planning includes -  Return to physical therapy, consider knee CSI    All questions were answered to the best of my ability and all concerns were addressed at this time.    Follow up in 3-4 weeks for above if needed.      This note is dictated using the M*Modal Fluency Direct word recognition program. There are word recognition mistakes that are occasionally missed on review.

## 2020-06-23 DIAGNOSIS — R74.8 ELEVATED CPK: Primary | ICD-10-CM

## 2020-06-23 DIAGNOSIS — R70.0 ELEVATED SEDIMENTATION RATE: ICD-10-CM

## 2020-06-23 DIAGNOSIS — R79.82 ELEVATED C-REACTIVE PROTEIN (CRP): ICD-10-CM

## 2020-06-23 DIAGNOSIS — M79.10 MYALGIA: ICD-10-CM

## 2020-06-23 DIAGNOSIS — M25.40 JOINT SWELLING: ICD-10-CM

## 2020-06-23 NOTE — PROGRESS NOTES
Multiple lab abnormalities appreciated, specifically elevation in CRP and ESR.  Patient has symptoms consistent with polymyalgia rheumatica versus polymyositis and further lab evaluation is indicated to ensure that the proper treatment is initiated.

## 2020-06-26 ENCOUNTER — TELEPHONE (OUTPATIENT)
Dept: FAMILY MEDICINE | Facility: CLINIC | Age: 70
End: 2020-06-26

## 2020-06-26 ENCOUNTER — TELEPHONE (OUTPATIENT)
Dept: SPORTS MEDICINE | Facility: CLINIC | Age: 70
End: 2020-06-26

## 2020-06-26 DIAGNOSIS — R74.8 ELEVATED CPK: Primary | ICD-10-CM

## 2020-06-26 NOTE — TELEPHONE ENCOUNTER
----- Message from Dolores Stack sent at 6/26/2020 12:21 PM CDT -----  Regarding: Medication  Patient was told to have urine tested before taking prednisone.  He is already on the medication and has questions.  Please advise

## 2020-06-26 NOTE — TELEPHONE ENCOUNTER
Per Dr. Obrien, pt can complete the urine sample early next week. Spoke with pt and he would prefer to be scheduled for this prior to his OT appointment 7/1 at 9am. Advised pt that a staff member would schedule this once Dr. Obrien signs a new order.     Honey Gomez  Orthopedic Clinical Assistant to Dr. Obrien

## 2020-06-26 NOTE — TELEPHONE ENCOUNTER
Spoke with patient. He was contacted by the lab that his urine sample was inadequate and he needs to provide another one. Dr. Obrien had told him that being on prednisone would affect the results of his urinalysis. He is on the last day of his 5 day taper and is wondering if he should provide the urine sample.     Advised that I would reach out to Dr. Obrien and get back to pt once I know the answer.     Honey Gomez  Orthopedic Clinical Assistant to Dr. Saad Obrien

## 2020-06-26 NOTE — TELEPHONE ENCOUNTER
----- Message from Milvia Chan sent at 6/26/2020  9:30 AM CDT -----  Contact: John crook/ Angelica Salazar [phone]  John is requesting to speak with nurse regarding pt returning to lab with a fresh urine sample. Please advise.

## 2020-06-26 NOTE — TELEPHONE ENCOUNTER
Spoke to John on the phone. John states pt needs to come in to do a repeat urine sample. Called pt to inform him of repeat test that needs to be completed. Pt verbalized understanding and states he will contact them.

## 2020-06-29 ENCOUNTER — TELEPHONE (OUTPATIENT)
Dept: ORTHOPEDICS | Facility: CLINIC | Age: 70
End: 2020-06-29

## 2020-06-29 NOTE — TELEPHONE ENCOUNTER
Contacted patient to schedule appointment for urine sample as requested. Patient expressed understanding of appointment information.    Xochilt Guthrie MS, OTC  Clinical Assistant to Dr. Saad Obrien

## 2020-07-14 DIAGNOSIS — M35.3 POLYMYALGIA RHEUMATICA: Primary | ICD-10-CM

## 2020-07-14 RX ORDER — PREDNISONE 5 MG/1
15 TABLET ORAL DAILY
Qty: 180 TABLET | Refills: 2 | Status: SHIPPED | OUTPATIENT
Start: 2020-07-14 | End: 2021-10-06

## 2020-07-14 NOTE — PROGRESS NOTES
Abraham appreciated great pain improvement during his prednisone burst.  He has transitioned back to [facility] but is not appreciating as much relief.  His labs are consistent with a diagnosis of polymyalgia rheumatica and we will treat with glucocorticoid medication for an extended period of time. We will start with 15 mg of prednisone daily for the next 1-2 months and will make dose adjustments as needed while maintaining regular follow-up.

## 2020-07-15 ENCOUNTER — OFFICE VISIT (OUTPATIENT)
Dept: SPORTS MEDICINE | Facility: CLINIC | Age: 70
End: 2020-07-15
Payer: MEDICARE

## 2020-07-15 DIAGNOSIS — M35.3 POLYMYALGIA RHEUMATICA: Primary | ICD-10-CM

## 2020-07-15 PROCEDURE — 99214 OFFICE O/P EST MOD 30 MIN: CPT | Mod: 95,,, | Performed by: ORTHOPAEDIC SURGERY

## 2020-07-15 PROCEDURE — 99214 PR OFFICE/OUTPT VISIT, EST, LEVL IV, 30-39 MIN: ICD-10-PCS | Mod: 95,,, | Performed by: ORTHOPAEDIC SURGERY

## 2020-07-15 NOTE — PROGRESS NOTES
Telemedicine/Virtual Visit Documentation:      The patient location is home, using mobile device, safe     The chief complaint leading to consultation is: see HPI     VISIT TYPE X   Virtual visit with synchronous audio and video X   Telephone E/M service         CC:   Follow-up bilateral shoulder pain, bilateral hand swelling    HPI: Abraham is using the telemedicine service for follow-up on his bilateral shoulder and hand pain.  He states that he had near complete pain relief with the prednisone burst for 5 days, but upon stopping it in returning to [facility] his pain has returned.  He was started again on prednisone 15 mg daily yesterday and states that he is already noticing improvement in his pain.  He had labs obtained after a prior visit and would like to review those as well.  He also has some questions about his new diagnosis of polymyalgia rheumatica.    REVIEW OF SYSTEMS:   Constitution: Patient denies fever, chills, night sweats, and weight changes.  Eyes: Patient denies eye pain or vision changes.  HENT: Patient denies headache, ear pain, sore throat, or nasal discharge.  CVS: Patient denies chest pain.  Lungs: Patient denies shortness of breath or cough.  Abd: Patient denies stomach pain, nausea, or vomiting.  Skin: Patient denies skin rash or itching.    Hematologic/Lymphatic: Patient denies easy bruising.   Musculoskeletal: Patient denies recent falls. See HPI.  Psych: Patient denies any current anxiety or nervousness.    PHYSICAL EXAMINATION:  General: In no acute distress, well developed, well nourished, no diaphoresis  Eyes: EOM full and smooth, no eye redness or discharge  HENT: normocephalic and atraumatic, neck supple, trachea midline, no nasal discharge, no external ear redness or discharge  Lungs: respirations non-labored, no conversational dyspnea   Neuro: AAOx3, CN2-12 grossly intact  Skin: No rashes no face or neck, warm and dry  Psychiatric: cooperative, pleasant, mood and affect appropriate for  age    LABS:  Myoglobin, serum - 111  Myoglobin, urine - 25  CK - 202  ESR -58  CRP 45.4  RADHIKA, RhF negative      ASSESSMENT:  1. Polymyalgia rheumatica        PLAN:   1. Polymyalgia rheumatica - improved    - Abraham is found great improvement in his pain with prednisone and based on his response and symptoms and labs the diagnosis of polymyalgia rheumatica was made.    - I advised that generally this treatment will resolve with an extended course of prednisone.  We will start him on 15 mg a day for the next month and will plan on decreasing to 12.5 mg daily at his follow-up visit if we are able to tolerate the decrease in dose.    - Labs personally review with him at this time.    -  I advised for him to contact his cardiologist to ensure that it is appropriate for him to be on prednisone for an extended period of time, especially as treatment may last for over a year of a long and slow prednisone taper.      Future planning includes -  Reassess in 1 month over a telemedicine visit to attempt beginning of slow taper    All questions were answered to the best of my ability and all concerns were addressed at this time.      This note is dictated using the M*Modal Fluency Direct word recognition program. There are word recognition mistakes that are occasionally missed on review.

## 2020-07-22 ENCOUNTER — OFFICE VISIT (OUTPATIENT)
Dept: FAMILY MEDICINE | Facility: CLINIC | Age: 70
End: 2020-07-22
Payer: MEDICARE

## 2020-07-22 VITALS
HEART RATE: 75 BPM | HEIGHT: 72 IN | DIASTOLIC BLOOD PRESSURE: 72 MMHG | BODY MASS INDEX: 32.21 KG/M2 | TEMPERATURE: 99 F | OXYGEN SATURATION: 96 % | SYSTOLIC BLOOD PRESSURE: 138 MMHG | WEIGHT: 237.81 LBS

## 2020-07-22 DIAGNOSIS — K52.9 GASTROENTERITIS: ICD-10-CM

## 2020-07-22 DIAGNOSIS — B35.1 ONYCHOMYCOSIS: ICD-10-CM

## 2020-07-22 DIAGNOSIS — I25.10 CORONARY ARTERY DISEASE INVOLVING NATIVE CORONARY ARTERY WITHOUT ANGINA PECTORIS, UNSPECIFIED WHETHER NATIVE OR TRANSPLANTED HEART: Primary | ICD-10-CM

## 2020-07-22 DIAGNOSIS — E55.9 VITAMIN D DEFICIENCY: ICD-10-CM

## 2020-07-22 DIAGNOSIS — E78.2 MIXED HYPERLIPIDEMIA: ICD-10-CM

## 2020-07-22 DIAGNOSIS — I10 ESSENTIAL HYPERTENSION: ICD-10-CM

## 2020-07-22 DIAGNOSIS — M35.3 POLYMYALGIA RHEUMATICA: ICD-10-CM

## 2020-07-22 DIAGNOSIS — N40.1 BENIGN PROSTATIC HYPERPLASIA WITH LOWER URINARY TRACT SYMPTOMS, SYMPTOM DETAILS UNSPECIFIED: ICD-10-CM

## 2020-07-22 PROCEDURE — 99999 PR PBB SHADOW E&M-EST. PATIENT-LVL V: CPT | Mod: PBBFAC,,, | Performed by: INTERNAL MEDICINE

## 2020-07-22 PROCEDURE — 99214 OFFICE O/P EST MOD 30 MIN: CPT | Mod: S$GLB,,, | Performed by: INTERNAL MEDICINE

## 2020-07-22 PROCEDURE — 99999 PR PBB SHADOW E&M-EST. PATIENT-LVL V: ICD-10-PCS | Mod: PBBFAC,,, | Performed by: INTERNAL MEDICINE

## 2020-07-22 PROCEDURE — 99214 PR OFFICE/OUTPT VISIT, EST, LEVL IV, 30-39 MIN: ICD-10-PCS | Mod: S$GLB,,, | Performed by: INTERNAL MEDICINE

## 2020-07-22 RX ORDER — CICLOPIROX OLAMINE 7.7 MG/100ML
SUSPENSION TOPICAL 2 TIMES DAILY
Qty: 60 ML | Refills: 2 | Status: SHIPPED | OUTPATIENT
Start: 2020-07-22 | End: 2022-01-12

## 2020-07-22 RX ORDER — IBUPROFEN 200 MG
1 CAPSULE ORAL DAILY
COMMUNITY
End: 2020-11-25

## 2020-07-22 NOTE — PATIENT INSTRUCTIONS
We have reviewed your prescription medications.   We will order routine labs.   Continue to follow-up with Dr Obrien about PMR. I am glad that you are feeling better.  I will send you some ciclopirox drops for the toenail fungus. Use it twice a day.  Please restart the avodart as recommended by Dr Gonzalez or start the finasteride. These do not seem to be related to your joint pains.

## 2020-07-29 NOTE — PROGRESS NOTES
Subjective:       Patient ID: Carlos Morgan is a 70 y.o. male.    Chief Complaint: Follow-up (PMR) and Toe Pain (Toe nail fungus)     I have reviewed the PMH,  and  for this patient    He  has a past medical history of CAD (coronary artery disease), Disorder of kidney and ureter, Hyperlipidemia, Hypertension, Plantar fasciitis, Sleep apnea (2005), and Urinary tract infection.     The patient presents today for follow-up of chronic conditions.  He reports that he has been doing well.  He has been diagnosed with polymyalgia rheumatica by the sports medicine doctor.  They have been treating him with prednisone and trying to wean down the treatment.  He has a history of seeing Dr. Gonzalez for BPH.  He was put on Avodart and finasteride.  He stopped these medications because he was having joint pains.  The joint pains are apparently not related to the medications so I encouraged him to restart them.  He also complains of thickening of his nails on his toes.  We will give him a medication for that.  He reports that I sent him to physical therapy for his neck in hip pain and he had good results.  He hurt his back and also started having groin pain so he saw Dr. Obrien.  This is how he was diagnosed with polymyalgia rheumatica.  He has been to occupational therapy about problems with his hands.  He seems to be getting better with steroid treatment.  He had blood work done in June which looked good.  His blood counts and blood chemistries were normal.  His liver tests were normal.  His cholesterol was good with an LDL of 61.  His blood pressure today is normal at 138/72.    The patient denies chest pain, shortness of breath, nausea, or dizziness.     Active Ambulatory Problems     Diagnosis Date Noted    Gastroenteritis 09/12/2014    Hyperlipemia 09/12/2014    CAD (coronary artery disease) 09/12/2014    Obesity 09/12/2014    HTN (hypertension) 03/05/2015    Total bilirubin, elevated 03/19/2015    Elevated CPK  06/16/2016    Class 1 obesity with serious comorbidity and body mass index (BMI) of 32.0 to 32.9 in adult 06/16/2016    Onychomycosis 12/28/2016    Vitamin D deficiency 12/28/2016    Colon cancer screening 10/04/2018    Allergic rhinitis 10/04/2018    Fatty liver 11/05/2018    Cyst of left kidney 11/05/2018    Arthralgia of both hands 11/05/2018    Hearing loss of right ear 11/14/2018    Acute otitis externa of right ear 11/14/2018    Tinnitus of right ear 11/14/2018    Plantar fasciitis 06/19/2019    Low serum HDL 07/19/2019    Hx of CABG 01/23/2020    Sleep apnea 01/23/2020    Pain in neck 01/29/2020    Pain in left hip 01/29/2020    Weakness 01/29/2020    Family history of prostate cancer in father 02/18/2020    Benign prostatic hyperplasia with weak urinary stream 02/18/2020    Bilateral hand pain 06/10/2020    Pain in both hands 06/16/2020    Benign prostatic hyperplasia with lower urinary tract symptoms 07/22/2020    Polymyalgia rheumatica 07/22/2020     Resolved Ambulatory Problems     Diagnosis Date Noted    Acute upper respiratory infection 09/12/2014    URI, acute 01/27/2015    Bronchitis 03/05/2015    Prostate cancer screening 06/16/2016    Preventative health care 06/16/2016    Needs flu shot 10/04/2018    Need for prophylactic vaccination with Streptococcus pneumoniae (Pneumococcus) and Influenza vaccines 11/05/2018    Viral URI with cough 02/12/2019    Bronchitis, mucopurulent recurrent 02/22/2019    Flu-like symptoms 02/22/2019    Pneumonia of left lower lobe due to infectious organism 03/14/2019    Dehydration 06/19/2019     Past Medical History:   Diagnosis Date    Disorder of kidney and ureter     Hyperlipidemia     Hypertension     Urinary tract infection          MEDICATIONS:  Current Outpatient Medications:     amLODIPine (NORVASC) 10 MG tablet, Take 1 tablet (10 mg total) by mouth every evening., Disp: 30 tablet, Rfl: 1    aspirin 162.5 mg Cp24, Take 81  mg by mouth., Disp: , Rfl:     calcium citrate (CALCITRATE) 200 mg (950 mg) tablet, Take 1 tablet by mouth once daily., Disp: , Rfl:     co-enzyme Q-10 30 mg capsule, Take 30 mg by mouth 3 (three) times daily., Disp: , Rfl:     diclofenac sodium (VOLTAREN) 1 % Gel, Apply 2 g topically 2 (two) times daily as needed., Disp: 100 g, Rfl: 1    dutasteride (AVODART) 0.5 mg capsule, Take 1 capsule (0.5 mg total) by mouth once daily., Disp: 90 capsule, Rfl: 3    flaxseed oil 1,000 mg Cap, Take by mouth., Disp: , Rfl:     hydroCHLOROthiazide (MICROZIDE) 12.5 mg capsule, Take 1 capsule (12.5 mg total) by mouth once daily., Disp: 90 capsule, Rfl: 0    olmesartan (BENICAR) 40 MG tablet, Take 1 tablet (40 mg total) by mouth once daily., Disp: 90 tablet, Rfl: 1    predniSONE (DELTASONE) 5 MG tablet, Take 3 tablets (15 mg total) by mouth once daily., Disp: 180 tablet, Rfl: 2    rosuvastatin (CRESTOR) 20 MG tablet, Take 1 tablet (20 mg total) by mouth every evening., Disp: 90 tablet, Rfl: 1    vitamin D (VITAMIN D3) 1000 units Tab, Take 185 mg by mouth 2 (two) times daily., Disp: , Rfl:     ciclopirox (LOPROX) 0.77 % Susp, Apply topically 2 (two) times daily., Disp: 60 mL, Rfl: 2    Current Facility-Administered Medications:     triamcinolone acetonide injection 40 mg, 40 mg, INTRABURSAL, 1 time in Clinic/HOD, Saad Obrien DO      HEALTH MAINTENANCE:   Health Maintenance   Topic Date Due    Aspirin/Antiplatelet Therapy  02/22/2020    Lipid Panel  07/23/2025    TETANUS VACCINE  10/04/2028    Hepatitis C Screening  Completed    Pneumococcal Vaccine (65+ High/Highest Risk)  Completed    Abdominal Aortic Aneurysm Screening  Completed       Review of Systems   Constitutional: Positive for activity change. Negative for chills, fatigue, fever and unexpected weight change.   HENT: Negative for congestion, ear discharge, ear pain, hearing loss, rhinorrhea, sore throat and trouble swallowing.    Eyes: Negative for  discharge, redness, itching and visual disturbance.   Respiratory: Negative for cough, chest tightness, shortness of breath and wheezing.    Cardiovascular: Negative for chest pain, palpitations and leg swelling.   Gastrointestinal: Negative for abdominal pain, blood in stool, constipation, diarrhea, nausea and vomiting.   Endocrine: Negative for cold intolerance, heat intolerance, polydipsia and polyuria.   Genitourinary: Negative for difficulty urinating, dysuria, flank pain, frequency, hematuria and urgency.   Musculoskeletal: Positive for arthralgias and joint swelling. Negative for back pain, myalgias and neck pain.   Skin: Negative for color change and rash.   Neurological: Negative for dizziness, tremors, weakness, numbness and headaches.   Psychiatric/Behavioral: Negative for confusion, dysphoric mood and sleep disturbance. The patient is not nervous/anxious.        Objective:          A1C:  Recent Labs   Lab 06/17/19  0731   Hemoglobin A1C 5.3     CBC:  Recent Labs   Lab 05/25/18  0644 11/01/18  0703 06/17/19  0731 06/22/20  1418 07/23/20  0831   WBC 4.91 5.25 6.23 8.50 7.44   RBC 4.87 4.80 5.00 4.35 L 4.27 L   Hemoglobin 15.3 15.0 15.6 12.6 L 12.6 L   Hematocrit 45.0 44.2 45.4 39.6 L 39.1 L   Platelets 194 182 210 334 278   Mean Corpuscular Volume 92 92 91 91 92   Mean Corpuscular Hemoglobin 31.4 H 31.3 H 31.2 H 29.0 29.5   Mean Corpuscular Hemoglobin Conc 34.0 33.9 34.4 31.8 L 32.2     CMP:  Recent Labs   Lab 11/21/17  0753 05/25/18  0644 11/01/18  0703 06/17/19  0731 07/17/19  0808 11/07/19  0623 06/24/20  0754 07/23/20  0831   Glucose 103 110 109 113 H 108 100  --  96   Calcium 9.6 9.5 9.3 9.7 9.3 9.3  --  9.5   Albumin 4.5 4.5 4.4 4.5 4.4 4.3 4.5 4.2   Total Protein 7.4 7.3 7.1 7.3 7.3 6.7 7.6 6.7   Sodium 145 145 140 142 144 141  --  143   Potassium 4.7 4.7 5.0 4.2 4.3 4.0  --  3.7   CO2 29 29 27 27 24 29  --  27   Chloride 106 106 106 104 107 107  --  106   BUN, Bld 16 23 H 18 21 H 23 H 17  --  25 H    Creatinine 0.80 0.72 0.78 0.82 0.72 0.69  --  0.64   Alkaline Phosphatase 64 67 57 60 56 55 72 58   ALT 40 30 23 25 25 25 27 21   AST 31 29 28 25 26 29 28 22   Total Bilirubin 1.6 H 1.3 H 1.1 H 1.2 H 1.2 H 1.5 H 0.6 0.8     LIPIDS:  Recent Labs   Lab 11/21/17  0753 05/25/18  0644 11/01/18  0703 07/17/19  0808 11/07/19  0623 07/23/20  0831   TSH  --   --  2.650  --   --   --    HDL 48 38 L 42 39 L 45 51   Cholesterol 116 L 123 106 L 109 L 124 111 L   Triglycerides 80 64 69 75 87 93   LDL Cholesterol 52.0 L 72.2 50.2 L 55.0 L 61.6 L 41.4 L   Hdl/Cholesterol Ratio 41.4 30.9 39.6 35.8 36.3 45.9   Non-HDL Cholesterol 68 85 64 70 79 60   Total Cholesterol/HDL Ratio 2.4 3.2 2.5 2.8 2.8 2.2     TSH:  Recent Labs   Lab 11/01/18  0703   TSH 2.650           Physical Exam  Constitutional:       Appearance: He is well-developed.   HENT:      Head: Normocephalic and atraumatic.      Right Ear: External ear normal. No middle ear effusion. Tympanic membrane is not erythematous.      Left Ear: External ear normal.  No middle ear effusion. Tympanic membrane is not erythematous.      Nose: No rhinorrhea.      Right Sinus: No maxillary sinus tenderness or frontal sinus tenderness.      Left Sinus: No maxillary sinus tenderness or frontal sinus tenderness.      Mouth/Throat:      Pharynx: No posterior oropharyngeal erythema.      Tonsils: No tonsillar exudate.   Eyes:      General:         Right eye: No discharge.         Left eye: No discharge.      Conjunctiva/sclera: Conjunctivae normal.      Right eye: Right conjunctiva is not injected.      Left eye: Left conjunctiva is not injected.      Pupils: Pupils are equal, round, and reactive to light.   Neck:      Thyroid: No thyromegaly.   Cardiovascular:      Rate and Rhythm: Normal rate and regular rhythm.      Heart sounds: Normal heart sounds. No murmur.   Pulmonary:      Effort: Pulmonary effort is normal.      Breath sounds: Normal breath sounds. No wheezing, rhonchi or rales.    Abdominal:      General: Bowel sounds are normal. There is no distension.      Tenderness: There is no abdominal tenderness.   Musculoskeletal:         General: No tenderness.   Lymphadenopathy:      Cervical: No cervical adenopathy.   Skin:     General: Skin is warm and dry.      Findings: No lesion or rash.   Neurological:      Cranial Nerves: No cranial nerve deficit.      Deep Tendon Reflexes: Reflexes normal.   Psychiatric:         Mood and Affect: Mood is not anxious or depressed.         Speech: Speech is not rapid and pressured.         Behavior: Behavior normal. Behavior is not agitated or aggressive.               Assessment and Plan:     Problem List Items Addressed This Visit        Derm    Onychomycosis - we will treat this with ciclopirox drops.  Use them twice a day.       Cardiac/Vascular    Hyperlipemia - on Crestor.  Check labs.    Relevant Orders    Lipid Panel (Completed)    CAD (coronary artery disease) - Primary- stable.  He sees Dr. Hernandez at [facility].      HTN (hypertension)- BP looks good. Continue current medications. We will check labs. Continue to work on diet to reduce salt and exercise 3 times a week for 30 minutes a day.    Relevant Orders    CBC auto differential (Completed)    Comprehensive metabolic panel (Completed)       Renal/    Benign prostatic hyperplasia with lower urinary tract symptoms- I recommend restarting the Avodart or the Proscar that was prescribed by Urology.  These are not causing the joint pain.       Immunology/Multi System    Polymyalgia rheumatica- continue prednisone and follow-up with Dr. Obrien.  Brittany.       Endocrine    Vitamin D deficiency- vitamin-D treatment ordered.  Check vitamin-D level    Relevant Orders    Vitamin D (Completed)       GI    Gastroenteritis- this is resolved.  Stable.          Orders Placed This Encounter    CBC auto differential    Comprehensive metabolic panel    Lipid Panel    Vitamin D    ciclopirox (LOPROX)  0.77 % Susp         Follow-up  in  6 months.       Alisha Amador MD,  FACP  Internal Medicine

## 2020-08-26 ENCOUNTER — PATIENT OUTREACH (OUTPATIENT)
Dept: ADMINISTRATIVE | Facility: OTHER | Age: 70
End: 2020-08-26

## 2020-08-26 DIAGNOSIS — Z12.11 ENCOUNTER FOR FIT (FECAL IMMUNOCHEMICAL TEST) SCREENING: Primary | ICD-10-CM

## 2020-08-28 ENCOUNTER — OFFICE VISIT (OUTPATIENT)
Dept: UROLOGY | Facility: CLINIC | Age: 70
End: 2020-08-28
Payer: MEDICARE

## 2020-08-28 VITALS
BODY MASS INDEX: 32.1 KG/M2 | HEART RATE: 74 BPM | DIASTOLIC BLOOD PRESSURE: 66 MMHG | TEMPERATURE: 98 F | OXYGEN SATURATION: 98 % | SYSTOLIC BLOOD PRESSURE: 132 MMHG | WEIGHT: 237 LBS | RESPIRATION RATE: 18 BRPM | HEIGHT: 72 IN

## 2020-08-28 DIAGNOSIS — Z80.42 FAMILY HISTORY OF PROSTATE CANCER IN FATHER: ICD-10-CM

## 2020-08-28 DIAGNOSIS — T50.905A DRUG-INDUCED GYNECOMASTIA: ICD-10-CM

## 2020-08-28 DIAGNOSIS — M35.3 POLYMYALGIA RHEUMATICA: ICD-10-CM

## 2020-08-28 DIAGNOSIS — N28.1 CYST OF LEFT KIDNEY: ICD-10-CM

## 2020-08-28 DIAGNOSIS — N40.1 BENIGN PROSTATIC HYPERPLASIA WITH LOWER URINARY TRACT SYMPTOMS, SYMPTOM DETAILS UNSPECIFIED: Primary | ICD-10-CM

## 2020-08-28 DIAGNOSIS — N62 DRUG-INDUCED GYNECOMASTIA: ICD-10-CM

## 2020-08-28 PROCEDURE — 99214 PR OFFICE/OUTPT VISIT, EST, LEVL IV, 30-39 MIN: ICD-10-PCS | Mod: S$GLB,,, | Performed by: UROLOGY

## 2020-08-28 PROCEDURE — 99214 OFFICE O/P EST MOD 30 MIN: CPT | Mod: S$GLB,,, | Performed by: UROLOGY

## 2020-08-28 PROCEDURE — 99999 PR PBB SHADOW E&M-EST. PATIENT-LVL V: ICD-10-PCS | Mod: PBBFAC,,, | Performed by: UROLOGY

## 2020-08-28 PROCEDURE — 99999 PR PBB SHADOW E&M-EST. PATIENT-LVL V: CPT | Mod: PBBFAC,,, | Performed by: UROLOGY

## 2020-08-28 NOTE — PROGRESS NOTES
Subjective:       Patient ID: Carlos Morgan is a 70 y.o. male.    Chief Complaint: Other (Cyst of left kidney)    69 yo WM with BPH on Avodart. No more nocturia. Developed mild gynecomastia.  Mr. Morgan developed polymyalgia rheumatica starting in March.  He has been being treated with prednisone by Dr. Obrien with good response.  Patient has had a good response to Avodart he had stopped it for a month when he originally developed the PMR but has restarted and is voiding well with no nocturia, normal frequency and no obstruction.  He does relate that when he needs to urinate he feels pressure or tenderness at the tip of the penis no signs of infection burning or split stream.  To the gynecomastia we will check estrogen levels and if estrogen is elevated because of the dutasteride then we will start Arimidex and see the patient responds.  The only other option is to stop the Avodart but then the patient would eventually require resection of his prostate.    Benign Prostatic Hypertrophy  This is a chronic problem. The problem is unchanged. Irritative symptoms do not include frequency (x6), nocturia (x0) or urgency. Obstructive symptoms do not include dribbling, incomplete emptying, an intermittent stream, a slower stream, straining or a weak stream. Pertinent negatives include no chills, dysuria, genital pain, hematuria, hesitancy, nausea or vomiting. AUA score is 0-7. The symptoms are aggravated by caffeine and diuretics. Past treatments include dutasteride. The treatment provided significant relief. He has been using treatment for 2 or more years.     Review of Systems   Constitutional: Negative for activity change, appetite change, chills, diaphoresis, fatigue, fever and unexpected weight change.   HENT: Negative for congestion, hearing loss, sinus pressure and trouble swallowing.    Eyes: Negative for photophobia, pain, discharge and visual disturbance.   Respiratory: Negative for apnea, cough and shortness  of breath.    Cardiovascular: Negative for chest pain, palpitations and leg swelling.   Gastrointestinal: Negative for abdominal distention, abdominal pain, anal bleeding, blood in stool, constipation, diarrhea, nausea, rectal pain and vomiting.   Endocrine: Negative for cold intolerance, heat intolerance, polydipsia, polyphagia and polyuria.   Genitourinary: Negative for decreased urine volume, difficulty urinating, discharge, dysuria, enuresis, flank pain, frequency (x6), genital sores, hematuria, hesitancy, incomplete emptying, nocturia (x0), penile pain, penile swelling, scrotal swelling, testicular pain and urgency.   Musculoskeletal: Negative for arthralgias, back pain and myalgias.   Skin: Negative for color change, pallor, rash and wound.   Allergic/Immunologic: Negative for environmental allergies, food allergies and immunocompromised state.   Neurological: Negative for dizziness, seizures, weakness and headaches.   Hematological: Negative for adenopathy. Does not bruise/bleed easily.   Psychiatric/Behavioral: Negative.        Objective:      Physical Exam   Nursing note and vitals reviewed.  Constitutional: He is oriented to person, place, and time. He appears well-developed.  Non-toxic appearance. He does not appear ill. No distress.   HENT:   Head: Normocephalic and atraumatic.   Right Ear: External ear normal.   Left Ear: External ear normal.   Nose: Nose normal. No rhinorrhea or congestion.   Mouth/Throat: Mucous membranes are moist. No oropharyngeal exudate or posterior oropharyngeal erythema. Oropharynx is clear.   Eyes: Conjunctivae are normal. Pupils are equal, round, and reactive to light. Right eye exhibits no discharge. Left eye exhibits no discharge. No scleral icterus.   Neck: Normal range of motion. Neck supple. No muscular tenderness present. Carotid bruit is not present. No neck rigidity.   Cardiovascular: Normal rate, regular rhythm, normal heart sounds and normal pulses.     Pulmonary/Chest: Effort normal and breath sounds normal. No stridor. No respiratory distress. He has no wheezes. He has no rhonchi. He has no rales. He exhibits no tenderness.   Abdominal: Soft. Normal appearance and bowel sounds are normal. He exhibits no distension and no mass. There is no abdominal tenderness. There is no rebound and no guarding. No hernia.   Genitourinary:    Penis normal.     Musculoskeletal: Normal range of motion. No swelling, tenderness, deformity or signs of injury.      Right lower leg: No edema.      Left lower leg: No edema.   Lymphadenopathy:     He has no cervical adenopathy.   Neurological: He is alert and oriented to person, place, and time. He has normal reflexes. He displays no weakness and normal reflexes. No cranial nerve deficit or sensory deficit. Coordination and gait normal.   Skin: Skin is warm and dry. Capillary refill takes less than 2 seconds. No bruising, no lesion and no rash noted. He is not diaphoretic. No erythema. No jaundice or pallor.     Psychiatric: His behavior is normal. Mood, judgment and thought content normal.       Assessment:       1. Benign prostatic hyperplasia with lower urinary tract symptoms, symptom details unspecified    2. Cyst of left kidney    3. Polymyalgia rheumatica    4. Family history of prostate cancer in father    5. Drug-induced gynecomastia        Plan:       Patient Instructions   Will obtain baseline estrogen level today and repeat PSA due to family history of prostate cancer.  Patient is to notify me if breast continue enlarged or become tender.  Wall only start Arimidex of symptomatic but need a baseline estrogen level.  Patient will continue Avodart daily and follow-up in 6 months and get back on a yearly schedule if everything remains stable.  Will repeat PSA in estrogen at the 6 month visit if everything is stable was just to yearly PSA test after.

## 2020-09-01 ENCOUNTER — TELEPHONE (OUTPATIENT)
Dept: UROLOGY | Facility: CLINIC | Age: 70
End: 2020-09-01

## 2020-09-01 NOTE — TELEPHONE ENCOUNTER
----- Message from Piotr Gonzalez MD sent at 8/31/2020  8:38 PM CDT -----  Estrogen is slightly elevated.  Arimidex 1 mg daily called into pharmacy.  Will see if this helps with gynecomastia.  Would like to see estrogen level between 40 and 80 and see if this helps with the gynecomastia.

## 2020-09-16 ENCOUNTER — OFFICE VISIT (OUTPATIENT)
Dept: SPORTS MEDICINE | Facility: CLINIC | Age: 70
End: 2020-09-16
Payer: MEDICARE

## 2020-09-16 DIAGNOSIS — M35.3 POLYMYALGIA RHEUMATICA: Primary | ICD-10-CM

## 2020-09-16 PROCEDURE — 99214 PR OFFICE/OUTPT VISIT, EST, LEVL IV, 30-39 MIN: ICD-10-PCS | Mod: 95,,, | Performed by: ORTHOPAEDIC SURGERY

## 2020-09-16 PROCEDURE — 99214 OFFICE O/P EST MOD 30 MIN: CPT | Mod: 95,,, | Performed by: ORTHOPAEDIC SURGERY

## 2020-09-16 RX ORDER — PREDNISONE 2.5 MG/1
2.5 TABLET ORAL DAILY
Qty: 180 TABLET | Refills: 1 | Status: SHIPPED | OUTPATIENT
Start: 2020-09-16 | End: 2022-01-12

## 2020-09-16 NOTE — PROGRESS NOTES
Telemedicine/Virtual Visit Documentation:      The patient location is home, using mobile device, safe     The chief complaint leading to consultation is: see HPI     VISIT TYPE X   Virtual visit with synchronous audio and video X   Telephone E/M service         CC:  Follow-up on polymyalgia rheumatica    HPI:  Abraham is using telemedicine for follow-up on his recently diagnosed polymyalgia rheumatica.  He has been on prednisone 15 mg daily since July and states that his pain has remained completely resolved.  He is able to golf and exercise with no pain and is very pleased with how he has been feeling.  He denies any problems with the medication.  He states that he is having no nighttime awakenings due to his pain as he was before and he has not needed any other pain medication while on the prednisone.  He has missed a few days of the medication and has still experienced benefit from the steroid.    REVIEW OF SYSTEMS:   Constitution: Patient denies fever, chills, night sweats, and weight changes.  Eyes: Patient denies eye pain or vision changes.  HENT: Patient denies headache, ear pain, sore throat, or nasal discharge.  CVS: Patient denies chest pain.  Lungs: Patient denies shortness of breath or cough.  Abd: Patient denies stomach pain, nausea, or vomiting.  Skin: Patient denies skin rash or itching.    Hematologic/Lymphatic: Patient denies easy bruising.   Musculoskeletal: Patient denies recent falls. See HPI.  Psych: Patient denies any current anxiety or nervousness.    PAST MEDICAL HISTORY:  Past Medical History:   Diagnosis Date    CAD (coronary artery disease)     cabg    Disorder of kidney and ureter     Hyperlipidemia     Hypertension     Plantar fasciitis     Sleep apnea 2005    cpap, 2005    Urinary tract infection        FAMILY HISTORY:  Family History   Problem Relation Age of Onset    Depression Mother     Diabetes Mother     Cancer Father         prostate    Hypertension Brother     Heart  disease Maternal Grandfather     Prostate cancer Neg Hx     Kidney disease Neg Hx        SURGICAL HISTORY:  Past Surgical History:   Procedure Laterality Date    CORONARY ARTERY BYPASS GRAFT  2007       SOCIAL HISTORY:  Social History     Socioeconomic History    Marital status:      Spouse name: Not on file    Number of children: Not on file    Years of education: Not on file    Highest education level: Not on file   Occupational History    Not on file   Social Needs    Financial resource strain: Not hard at all    Food insecurity     Worry: Never true     Inability: Never true    Transportation needs     Medical: No     Non-medical: No   Tobacco Use    Smoking status: Former Smoker    Smokeless tobacco: Never Used   Substance and Sexual Activity    Alcohol use: Yes     Frequency: 2-4 times a month     Binge frequency: Never     Comment: socially    Drug use: Never    Sexual activity: Yes     Partners: Female   Lifestyle    Physical activity     Days per week: 1 day     Minutes per session: 30 min    Stress: Only a little   Relationships    Social connections     Talks on phone: Three times a week     Gets together: Three times a week     Attends Denominational service: Not on file     Active member of club or organization: Yes     Attends meetings of clubs or organizations: More than 4 times per year     Relationship status:    Other Topics Concern    Not on file   Social History Narrative    Not on file       ALLERGIES:  Review of patient's allergies indicates:   Allergen Reactions    Lisinopril Other (See Comments)     Hearing loss         PHYSICAL EXAMINATION:  General: In no acute distress, well developed, well nourished, no diaphoresis  Eyes: EOM full and smooth, no eye redness or discharge  HENT: normocephalic and atraumatic, neck supple, trachea midline, no nasal discharge, no external ear redness or discharge  Lungs: respirations non-labored, no conversational dyspnea    Neuro: AAOx3, CN2-12 grossly intact  Skin: No rashes no face or neck, warm and dry  Psychiatric: cooperative, pleasant, mood and affect appropriate for age      ASSESSMENT:  1. Polymyalgia rheumatica          PLAN:   1.  Polymyalgia rheumatica - symptoms improved    - Abraham admits to doing great while on prednisone.  He has remained completely pain free for the past 2 months and is able to golf and exercise with no pain.  He is very pleased with how he has been feeling and denies any complications with the medications.    -   As his pain has remained well controlled, it is time to start decreasing the dose of his prednisone.  I recommend he go to 12.5 mg daily for the next 2 weeks and then attempt to decrease again further to 10 mg daily.  If his pain returns during this decrease, he is to increased back to his prior does for another week and then attempt a weaning again.  He will message us if weaning exacerbates his pain.    -  Advised for him to discuss his current prednisone dosing with his cardiologist and if his cardiologist deems necessary we will adjust the dosing as indicated.    - Prednisone  2.5 mg tabs prescribed at this time to dosing easier.      Future planning includes -  Continue with slow prednisone wean as tolerated    All questions were answered to the best of my ability and all concerns were addressed at this time.    Follow up in 4-6 weeks for continued medication management.      This note is dictated using the M*Modal Fluency Direct word recognition program. There are word recognition mistakes that are occasionally missed on review.

## 2020-10-20 ENCOUNTER — PATIENT MESSAGE (OUTPATIENT)
Dept: SPORTS MEDICINE | Facility: CLINIC | Age: 70
End: 2020-10-20

## 2020-11-25 ENCOUNTER — PATIENT MESSAGE (OUTPATIENT)
Dept: SPORTS MEDICINE | Facility: CLINIC | Age: 70
End: 2020-11-25

## 2020-11-25 ENCOUNTER — PATIENT MESSAGE (OUTPATIENT)
Dept: FAMILY MEDICINE | Facility: CLINIC | Age: 70
End: 2020-11-25

## 2020-11-25 ENCOUNTER — OFFICE VISIT (OUTPATIENT)
Dept: URGENT CARE | Facility: CLINIC | Age: 70
End: 2020-11-25
Payer: MEDICARE

## 2020-11-25 VITALS
RESPIRATION RATE: 17 BRPM | HEIGHT: 72 IN | BODY MASS INDEX: 32.51 KG/M2 | DIASTOLIC BLOOD PRESSURE: 89 MMHG | SYSTOLIC BLOOD PRESSURE: 142 MMHG | HEART RATE: 78 BPM | TEMPERATURE: 99 F | WEIGHT: 240 LBS | OXYGEN SATURATION: 99 %

## 2020-11-25 DIAGNOSIS — R05.9 COUGH: ICD-10-CM

## 2020-11-25 DIAGNOSIS — U07.1 COVID-19: Primary | ICD-10-CM

## 2020-11-25 DIAGNOSIS — U07.1 COVID-19 VIRUS DETECTED: ICD-10-CM

## 2020-11-25 LAB
CTP QC/QA: YES
SARS-COV-2 RDRP RESP QL NAA+PROBE: POSITIVE

## 2020-11-25 PROCEDURE — 99214 OFFICE O/P EST MOD 30 MIN: CPT | Mod: S$GLB,,, | Performed by: PHYSICIAN ASSISTANT

## 2020-11-25 PROCEDURE — 99214 PR OFFICE/OUTPT VISIT, EST, LEVL IV, 30-39 MIN: ICD-10-PCS | Mod: S$GLB,,, | Performed by: PHYSICIAN ASSISTANT

## 2020-11-25 PROCEDURE — U0002: ICD-10-PCS | Mod: QW,S$GLB,, | Performed by: PHYSICIAN ASSISTANT

## 2020-11-25 PROCEDURE — U0002 COVID-19 LAB TEST NON-CDC: HCPCS | Mod: QW,S$GLB,, | Performed by: PHYSICIAN ASSISTANT

## 2020-11-25 NOTE — PROGRESS NOTES
Subjective:       Patient ID: Carlos Morgan is a 70 y.o. male.    Vitals:  height is 6' (1.829 m) and weight is 108.9 kg (240 lb). His temporal temperature is 98.9 °F (37.2 °C). His blood pressure is 142/89 (abnormal) and his pulse is 78. His respiration is 17 and oxygen saturation is 99%.     Chief Complaint: Sinus Problem    Sinus Problem  The current episode started yesterday. The problem has been gradually worsening since onset. There has been no fever. His pain is at a severity of 2/10. The pain is mild. Associated symptoms include chills, congestion and coughing. Pertinent negatives include no diaphoresis, ear pain, shortness of breath, sinus pressure or sore throat. Past treatments include acetaminophen (dayquil). The treatment provided mild relief.       Constitution: Positive for chills. Negative for sweating and fever.   HENT: Positive for congestion, postnasal drip and sinus pain. Negative for ear pain, sinus pressure, sore throat and voice change.    Neck: Negative for painful lymph nodes.   Eyes: Negative for eye redness.   Respiratory: Positive for cough. Negative for chest tightness, sputum production, bloody sputum, COPD, shortness of breath, stridor, wheezing and asthma.    Gastrointestinal: Negative for nausea and vomiting.   Musculoskeletal: Positive for muscle ache.   Skin: Negative for rash and erythema.   Allergic/Immunologic: Negative for seasonal allergies and asthma.   Hematologic/Lymphatic: Negative for swollen lymph nodes.       Objective:      Physical Exam   Constitutional: He is oriented to person, place, and time. He appears well-developed.   HENT:   Head: Normocephalic and atraumatic. Head is without abrasion, without contusion and without laceration.   Ears:   Right Ear: External ear normal.   Left Ear: External ear normal.   Nose: Nose normal.   Eyes: Pupils are equal, round, and reactive to light. Conjunctivae, EOM and lids are normal.   Neck: Trachea normal, full passive  range of motion without pain and phonation normal. Neck supple.   Cardiovascular: Normal rate, regular rhythm and normal heart sounds.   Pulmonary/Chest: Effort normal and breath sounds normal. No stridor. No respiratory distress.   Musculoskeletal: Normal range of motion.   Neurological: He is alert and oriented to person, place, and time. He has intact cranial nerves.      Comments: Cn's grossly intact   Skin: Skin is warm, dry, intact and no rash. Capillary refill takes less than 2 seconds. abrasion, burn, bruising, erythema and ecchymosisPsychiatric: His speech is normal and behavior is normal. Judgment and thought content normal.   Nursing note and vitals reviewed.          Results for orders placed or performed in visit on 11/25/20   POCT COVID-19 Rapid Screening   Result Value Ref Range    POC Rapid COVID Positive (A) Negative     Acceptable Yes      Results reviewed with pt.  Assessment:       1. COVID-19    2. Cough        Plan:         COVID-19    Cough  -     POCT COVID-19 Rapid Screening         Patient Instructions   You have tested positive for COVID-19 today.  Per the CDC, you are now in a 10 day isolation.         This isolation starts from the day you first developed symptoms, not the day of your positive test. For example, if your symptoms began on a Monday, and you waited until Friday of the same week to get tested, and it was positive, your 10 day isolation begins from that Monday, not the Friday you tested positive.         However, if you are asymptomatic (a person who does not have any symptoms), and received a COVID-19 test that was positive, your 10 day isolation begins on the day you tested positive.  This is regardless if you were exposed to a known positive days earlier.  So for example, if you test positive as an asymptomatic on day 7 from exposure, you have now extended your 14 day quarantine to a 17 day quarantine.         Also, per the CDC guidelines, once your 10 days  have passed, and you have not had fever greater than 100.4F in the last 24 hours without taking any fever reducers such as Tylenol (Acetaminophen) or Motrin (Ibuprofen), you may return to your normal activities including social distancing, wearing masks, and frequent handwashing - YOU DO NOT NEED ANOTHER TEST, OR TO TEST NEGATIVE, IN ORDER TO END YOUR QUARANTINE!      Instructions for Patients with Confirmed or Suspected COVID-19    If you are awaiting your test result, you will either be called or it will be released to the patient portal.  If you have any questions about your test, please visit [email or web address] or call our COVID-19 information line at [phone].      Instructions for non-hospitalized or discharged patients with confirmed or suspected COVID-19:       Stay home except to get medical care.    Separate yourself from other people and animals in your home.    Call ahead before visiting your doctor.    Wear a face mask.    Cover your coughs and sneezes.    Clean your hands often.    Avoid sharing personal household items.    Clean all high-touch surfaces every day.    Monitor your symptoms. Seek prompt medical attention if your illness is worsening (e.g., difficulty breathing). Before seeking care, call your healthcare provider.    If you have a medical emergency and must call 911, notify the dispatcher that you have or are being evaluated for COVID-19. If possible, put on a face mask before emergency medical services arrive.    Use the following symptom-based strategy to return to normal activity following a suspected or confirmed case of COVID-19. Continue isolation until:   o At least 3 days (72 hours) have passed since recovery defined as resolution of fever without the use of fever-reducing medications and improvement in respiratory symptoms (e.g. cough, shortness of breath), and   o At least 10 days have passed since the first positive test.       As one of the next  steps, you will receive a call or text from the [place] Department of Health ([facility]) COVID-19 Tracing Team. See the contact information below so you know not to ignore the health departments call. It is important that you contact them back immediately so they can help.     Contact Tracer Number:  [phone]  Caller ID for most carriers: [place] Dept [facility]    What is contact tracing?   Contact tracing is a process that helps identify everyone who has been in close contact with an infected person. Contact tracers let those people know they may have been exposed and guide them on next steps. Confidentiality is important for everyone; no one will be told who may have exposed them to the virus.   Your involvement is important. The more we know about where and how this virus is spreading, the better chance we have at stopping it from spreading further.  What does exposure mean?   Exposure means you have been within 6 feet for more than 15 minutes with a person who has or had COVID-19.  What kind of questions do the contact tracers ask?   A contact tracer will confirm your basic contact information including name, address, phone number, and next of kin, as well as asking about any symptoms you may have had. Theyll also ask you how you think you may have gotten sick, such as places where you may have been exposed to the virus, and people you were with. Those names will never be shared with anyone outside of that call, and will only be used to help trace and stop the spread of the virus.   I have privacy concerns. How will the state use my information?   Your privacy about your health is important. All calls are completed using call centers that use the appropriate health privacy protection measures (HIPAA compliance), meaning that your patient information is safe. No one will ever ask you any questions related to immigration status. Your health comes first.   Do I have to participate?   You do not have to  participate, but we strongly encourage you to. Contact tracing can help us catch and control new outbreaks as theyre developing to keep your friends and family safe.   What if I dont hear from anyone?   If you dont receive a call within 24 hours, you can call the number above right away to inquire about your status. That line is open from 8:00 am - 8:00 p.m., 7 days a week.  Contact tracing saves lives! Together, we have the power to beat this virus and keep our loved ones and neighbors safe.       Instructions for household members, intimate partners and caregivers in a non-healthcare setting of a patient with confirmed or suspected COVID-19:         Close contacts should monitor their health and call their healthcare provider right away if they develop symptoms suggestive of COVID-19 (e.g., fever, cough, shortness of breath).    Stay home except to get medical care. Separate yourself from other people and animals in the home.   Monitor the patients symptoms. If the patient is getting sicker, call his or her healthcare provider. If the patient has a medical emergency and you need to call 911, notify the dispatch personnel that the patient has or is being evaluated for COVID-19.    Wear a facemask when around other people such as sharing a room or vehicle and before entering a healthcare provider's office.   Cover coughs and sneezes with a tissue. Throw used tissues in a lined trash can immediately and wash hands.   Clean hands often with soap and water for at least 20 seconds or with an alcohol-based hand , rubbing hands together until they feel dry. Avoid touching your eyes, nose, and mouth with unwashed hands.   Clean all high-touch; surfaces every day, including counters, tabletops, doorknobs, bathroom fixtures, toilets, phones, keyboards, tablets, bedside tables, etc. Use a household cleaning spray or wipe according to label instructions.   Avoid sharing personal household items such as  dishes, drinking glasses, cups, towels, bedding, etc. After these items are used, they should be washed thoroughly with soap and water.   Continue isolation until:   At least 3 days (72 hours) have passed since recovery defined as resolution of fever without the use of fever-reducing medications and improvement in respiratory symptoms (e.g. cough, shortness of breath), and    At least 10 days have passed since the patients first positive test.    https://www.cdc.gov/coronavirus/2019-ncov/your-health/index.htm      You must understand that you've received an Urgent Care treatment only and that you may be released before all your medical problems are known or treated. You, the patient, will arrange for follow up care as instructed.    Follow up with your PCP or specialty clinic as directed in the next 1-2 weeks if not improved or as needed. You can call [phone] to schedule an appointment with the appropriate provider.    If your condition worsens we recommend that you receive another evaluation at the emergency room immediately or contact your primary medical clinic's after hours call service to discuss your concerns.    Please go to the Emergency Department for any concerns or worsening of condition.

## 2020-11-25 NOTE — PATIENT INSTRUCTIONS
You have tested positive for COVID-19 today.  Per the CDC, you are now in a 10 day isolation.         This isolation starts from the day you first developed symptoms, not the day of your positive test. For example, if your symptoms began on a Monday, and you waited until Friday of the same week to get tested, and it was positive, your 10 day isolation begins from that Monday, not the Friday you tested positive.         However, if you are asymptomatic (a person who does not have any symptoms), and received a COVID-19 test that was positive, your 10 day isolation begins on the day you tested positive.  This is regardless if you were exposed to a known positive days earlier.  So for example, if you test positive as an asymptomatic on day 7 from exposure, you have now extended your 14 day quarantine to a 17 day quarantine.         Also, per the CDC guidelines, once your 10 days have passed, and you have not had fever greater than 100.4F in the last 24 hours without taking any fever reducers such as Tylenol (Acetaminophen) or Motrin (Ibuprofen), you may return to your normal activities including social distancing, wearing masks, and frequent handwashing - YOU DO NOT NEED ANOTHER TEST, OR TO TEST NEGATIVE, IN ORDER TO END YOUR QUARANTINE!      Instructions for Patients with Confirmed or Suspected COVID-19    If you are awaiting your test result, you will either be called or it will be released to the patient portal.  If you have any questions about your test, please visit [email or web address] or call our COVID-19 information line at [phone].      Instructions for non-hospitalized or discharged patients with confirmed or suspected COVID-19:       Stay home except to get medical care.    Separate yourself from other people and animals in your home.    Call ahead before visiting your doctor.    Wear a face mask.    Cover your coughs and sneezes.    Clean your hands often.    Avoid sharing personal  household items.    Clean all high-touch surfaces every day.    Monitor your symptoms. Seek prompt medical attention if your illness is worsening (e.g., difficulty breathing). Before seeking care, call your healthcare provider.    If you have a medical emergency and must call 911, notify the dispatcher that you have or are being evaluated for COVID-19. If possible, put on a face mask before emergency medical services arrive.    Use the following symptom-based strategy to return to normal activity following a suspected or confirmed case of COVID-19. Continue isolation until:   o At least 3 days (72 hours) have passed since recovery defined as resolution of fever without the use of fever-reducing medications and improvement in respiratory symptoms (e.g. cough, shortness of breath), and   o At least 10 days have passed since the first positive test.       As one of the next steps, you will receive a call or text from the [place] [facility] ([facility]) COVID-19 Tracing Team. See the contact information below so you know not to ignore the health departments call. It is important that you contact them back immediately so they can help.     Contact Tracer Number:  [phone]  Caller ID for most carriers: [facility]    What is contact tracing?   Contact tracing is a process that helps identify everyone who has been in close contact with an infected person. Contact tracers let those people know they may have been exposed and guide them on next steps. Confidentiality is important for everyone; no one will be told who may have exposed them to the virus.   Your involvement is important. The more we know about where and how this virus is spreading, the better chance we have at stopping it from spreading further.  What does exposure mean?   Exposure means you have been within 6 feet for more than 15 minutes with a person who has or had COVID-19.  What kind of questions do the contact tracers ask?   A  contact tracer will confirm your basic contact information including name, address, phone number, and next of kin, as well as asking about any symptoms you may have had. Theyll also ask you how you think you may have gotten sick, such as places where you may have been exposed to the virus, and people you were with. Those names will never be shared with anyone outside of that call, and will only be used to help trace and stop the spread of the virus.   I have privacy concerns. How will the state use my information?   Your privacy about your health is important. All calls are completed using call centers that use the appropriate health privacy protection measures (HIPAA compliance), meaning that your patient information is safe. No one will ever ask you any questions related to immigration status. Your health comes first.   Do I have to participate?   You do not have to participate, but we strongly encourage you to. Contact tracing can help us catch and control new outbreaks as theyre developing to keep your friends and family safe.   What if I dont hear from anyone?   If you dont receive a call within 24 hours, you can call the number above right away to inquire about your status. That line is open from 8:00 am - 8:00 p.m., 7 days a week.  Contact tracing saves lives! Together, we have the power to beat this virus and keep our loved ones and neighbors safe.       Instructions for household members, intimate partners and caregivers in a non-healthcare setting of a patient with confirmed or suspected COVID-19:         Close contacts should monitor their health and call their healthcare provider right away if they develop symptoms suggestive of COVID-19 (e.g., fever, cough, shortness of breath).    Stay home except to get medical care. Separate yourself from other people and animals in the home.   Monitor the patients symptoms. If the patient is getting sicker, call his or her healthcare provider. If the  patient has a medical emergency and you need to call 911, notify the dispatch personnel that the patient has or is being evaluated for COVID-19.    Wear a facemask when around other people such as sharing a room or vehicle and before entering a healthcare provider's office.   Cover coughs and sneezes with a tissue. Throw used tissues in a lined trash can immediately and wash hands.   Clean hands often with soap and water for at least 20 seconds or with an alcohol-based hand , rubbing hands together until they feel dry. Avoid touching your eyes, nose, and mouth with unwashed hands.   Clean all high-touch; surfaces every day, including counters, tabletops, doorknobs, bathroom fixtures, toilets, phones, keyboards, tablets, bedside tables, etc. Use a household cleaning spray or wipe according to label instructions.   Avoid sharing personal household items such as dishes, drinking glasses, cups, towels, bedding, etc. After these items are used, they should be washed thoroughly with soap and water.   Continue isolation until:   At least 3 days (72 hours) have passed since recovery defined as resolution of fever without the use of fever-reducing medications and improvement in respiratory symptoms (e.g. cough, shortness of breath), and    At least 10 days have passed since the patients first positive test.    https://www.cdc.gov/coronavirus/2019-ncov/your-health/index.htm      You must understand that you've received an Urgent Care treatment only and that you may be released before all your medical problems are known or treated. You, the patient, will arrange for follow up care as instructed.    Follow up with your PCP or specialty clinic as directed in the next 1-2 weeks if not improved or as needed. You can call [phone] to schedule an appointment with the appropriate provider.    If your condition worsens we recommend that you receive another evaluation at the emergency room immediately or  contact your primary medical clinic's after hours call service to discuss your concerns.    Please go to the Emergency Department for any concerns or worsening of condition.

## 2020-12-09 ENCOUNTER — PATIENT MESSAGE (OUTPATIENT)
Dept: SPORTS MEDICINE | Facility: CLINIC | Age: 70
End: 2020-12-09

## 2021-01-06 ENCOUNTER — OFFICE VISIT (OUTPATIENT)
Dept: FAMILY MEDICINE | Facility: CLINIC | Age: 71
End: 2021-01-06
Payer: MEDICARE

## 2021-01-06 VITALS
OXYGEN SATURATION: 95 % | HEART RATE: 83 BPM | HEIGHT: 72 IN | BODY MASS INDEX: 33.07 KG/M2 | DIASTOLIC BLOOD PRESSURE: 68 MMHG | SYSTOLIC BLOOD PRESSURE: 126 MMHG | WEIGHT: 244.19 LBS | TEMPERATURE: 98 F

## 2021-01-06 DIAGNOSIS — G47.33 OBSTRUCTIVE SLEEP APNEA SYNDROME: ICD-10-CM

## 2021-01-06 DIAGNOSIS — E78.2 MIXED HYPERLIPIDEMIA: ICD-10-CM

## 2021-01-06 DIAGNOSIS — Z12.11 SCREEN FOR COLON CANCER: ICD-10-CM

## 2021-01-06 DIAGNOSIS — B35.1 ONYCHOMYCOSIS: ICD-10-CM

## 2021-01-06 DIAGNOSIS — I11.9 HYPERTENSIVE HEART DISEASE WITHOUT HEART FAILURE: Primary | ICD-10-CM

## 2021-01-06 DIAGNOSIS — I25.10 CORONARY ARTERY DISEASE INVOLVING NATIVE CORONARY ARTERY WITHOUT ANGINA PECTORIS, UNSPECIFIED WHETHER NATIVE OR TRANSPLANTED HEART: ICD-10-CM

## 2021-01-06 DIAGNOSIS — M35.3 POLYMYALGIA RHEUMATICA: ICD-10-CM

## 2021-01-06 DIAGNOSIS — R39.12 BENIGN PROSTATIC HYPERPLASIA WITH WEAK URINARY STREAM: ICD-10-CM

## 2021-01-06 DIAGNOSIS — E66.01 MORBID OBESITY: ICD-10-CM

## 2021-01-06 DIAGNOSIS — N40.1 BENIGN PROSTATIC HYPERPLASIA WITH WEAK URINARY STREAM: ICD-10-CM

## 2021-01-06 PROBLEM — M79.642 BILATERAL HAND PAIN: Status: RESOLVED | Noted: 2020-06-10 | Resolved: 2021-01-06

## 2021-01-06 PROBLEM — R53.1 WEAKNESS: Status: RESOLVED | Noted: 2020-01-29 | Resolved: 2021-01-06

## 2021-01-06 PROBLEM — M54.2 PAIN IN NECK: Status: RESOLVED | Noted: 2020-01-29 | Resolved: 2021-01-06

## 2021-01-06 PROBLEM — H60.501 ACUTE OTITIS EXTERNA OF RIGHT EAR: Status: RESOLVED | Noted: 2018-11-14 | Resolved: 2021-01-06

## 2021-01-06 PROBLEM — M79.642 PAIN IN BOTH HANDS: Status: RESOLVED | Noted: 2020-06-16 | Resolved: 2021-01-06

## 2021-01-06 PROBLEM — M79.641 PAIN IN BOTH HANDS: Status: RESOLVED | Noted: 2020-06-16 | Resolved: 2021-01-06

## 2021-01-06 PROBLEM — M25.552 PAIN IN LEFT HIP: Status: RESOLVED | Noted: 2020-01-29 | Resolved: 2021-01-06

## 2021-01-06 PROBLEM — M79.641 BILATERAL HAND PAIN: Status: RESOLVED | Noted: 2020-06-10 | Resolved: 2021-01-06

## 2021-01-06 PROCEDURE — 99214 OFFICE O/P EST MOD 30 MIN: CPT | Mod: S$GLB,,, | Performed by: INTERNAL MEDICINE

## 2021-01-06 PROCEDURE — 99214 PR OFFICE/OUTPT VISIT, EST, LEVL IV, 30-39 MIN: ICD-10-PCS | Mod: S$GLB,,, | Performed by: INTERNAL MEDICINE

## 2021-01-06 PROCEDURE — 99999 PR PBB SHADOW E&M-EST. PATIENT-LVL IV: CPT | Mod: PBBFAC,,, | Performed by: INTERNAL MEDICINE

## 2021-01-06 PROCEDURE — 99999 PR PBB SHADOW E&M-EST. PATIENT-LVL IV: ICD-10-PCS | Mod: PBBFAC,,, | Performed by: INTERNAL MEDICINE

## 2021-01-06 RX ORDER — ASPIRIN 81 MG/1
81 TABLET ORAL DAILY
Qty: 90 TABLET | Refills: 3 | COMMUNITY
Start: 2021-01-06 | End: 2023-03-07

## 2021-01-06 RX ORDER — CICLOPIROX 80 MG/ML
SOLUTION TOPICAL NIGHTLY
Qty: 6.6 ML | Refills: 11 | Status: SHIPPED | OUTPATIENT
Start: 2021-01-06 | End: 2022-01-12

## 2021-01-10 ENCOUNTER — LAB VISIT (OUTPATIENT)
Dept: LAB | Facility: HOSPITAL | Age: 71
End: 2021-01-10
Payer: MEDICARE

## 2021-01-10 DIAGNOSIS — Z12.11 SCREEN FOR COLON CANCER: ICD-10-CM

## 2021-01-10 PROCEDURE — 82274 ASSAY TEST FOR BLOOD FECAL: CPT

## 2021-01-13 LAB — HEMOCCULT STL QL IA: NEGATIVE

## 2021-01-14 ENCOUNTER — PATIENT MESSAGE (OUTPATIENT)
Dept: FAMILY MEDICINE | Facility: CLINIC | Age: 71
End: 2021-01-14

## 2021-01-25 ENCOUNTER — PATIENT MESSAGE (OUTPATIENT)
Dept: FAMILY MEDICINE | Facility: CLINIC | Age: 71
End: 2021-01-25

## 2021-01-28 ENCOUNTER — IMMUNIZATION (OUTPATIENT)
Dept: PHARMACY | Facility: CLINIC | Age: 71
End: 2021-01-28
Payer: MEDICARE

## 2021-01-28 DIAGNOSIS — Z23 NEED FOR VACCINATION: Primary | ICD-10-CM

## 2021-02-05 ENCOUNTER — PATIENT MESSAGE (OUTPATIENT)
Dept: SPORTS MEDICINE | Facility: CLINIC | Age: 71
End: 2021-02-05

## 2021-02-24 ENCOUNTER — PATIENT OUTREACH (OUTPATIENT)
Dept: ADMINISTRATIVE | Facility: OTHER | Age: 71
End: 2021-02-24

## 2021-02-25 ENCOUNTER — IMMUNIZATION (OUTPATIENT)
Dept: PHARMACY | Facility: CLINIC | Age: 71
End: 2021-02-25
Payer: MEDICARE

## 2021-02-25 DIAGNOSIS — Z23 NEED FOR VACCINATION: Primary | ICD-10-CM

## 2021-03-02 ENCOUNTER — OFFICE VISIT (OUTPATIENT)
Dept: UROLOGY | Facility: CLINIC | Age: 71
End: 2021-03-02
Payer: MEDICARE

## 2021-03-02 VITALS
BODY MASS INDEX: 33.74 KG/M2 | DIASTOLIC BLOOD PRESSURE: 66 MMHG | HEIGHT: 72 IN | WEIGHT: 249.13 LBS | OXYGEN SATURATION: 98 % | TEMPERATURE: 98 F | SYSTOLIC BLOOD PRESSURE: 132 MMHG | HEART RATE: 78 BPM

## 2021-03-02 DIAGNOSIS — N28.1 CYST OF LEFT KIDNEY: ICD-10-CM

## 2021-03-02 DIAGNOSIS — R39.12 BENIGN PROSTATIC HYPERPLASIA WITH WEAK URINARY STREAM: Primary | ICD-10-CM

## 2021-03-02 DIAGNOSIS — N40.1 BENIGN PROSTATIC HYPERPLASIA WITH WEAK URINARY STREAM: Primary | ICD-10-CM

## 2021-03-02 DIAGNOSIS — Z80.42 FAMILY HISTORY OF PROSTATE CANCER IN FATHER: ICD-10-CM

## 2021-03-02 PROCEDURE — 99214 OFFICE O/P EST MOD 30 MIN: CPT | Mod: S$GLB,,, | Performed by: UROLOGY

## 2021-03-02 PROCEDURE — 99999 PR PBB SHADOW E&M-EST. PATIENT-LVL V: ICD-10-PCS | Mod: PBBFAC,,, | Performed by: UROLOGY

## 2021-03-02 PROCEDURE — 99214 PR OFFICE/OUTPT VISIT, EST, LEVL IV, 30-39 MIN: ICD-10-PCS | Mod: S$GLB,,, | Performed by: UROLOGY

## 2021-03-02 PROCEDURE — 99999 PR PBB SHADOW E&M-EST. PATIENT-LVL V: CPT | Mod: PBBFAC,,, | Performed by: UROLOGY

## 2021-03-02 RX ORDER — DUTASTERIDE 0.5 MG/1
0.5 CAPSULE, LIQUID FILLED ORAL DAILY
Qty: 90 CAPSULE | Refills: 3 | Status: SHIPPED | OUTPATIENT
Start: 2021-03-02 | End: 2021-10-06

## 2021-03-04 ENCOUNTER — PATIENT MESSAGE (OUTPATIENT)
Dept: SPORTS MEDICINE | Facility: CLINIC | Age: 71
End: 2021-03-04

## 2021-03-29 DIAGNOSIS — I10 ESSENTIAL HYPERTENSION: ICD-10-CM

## 2021-03-29 RX ORDER — OLMESARTAN MEDOXOMIL 40 MG/1
40 TABLET ORAL DAILY
Qty: 90 TABLET | Refills: 1 | Status: SHIPPED | OUTPATIENT
Start: 2021-03-29 | End: 2021-12-06

## 2021-04-15 ENCOUNTER — OFFICE VISIT (OUTPATIENT)
Dept: URGENT CARE | Facility: CLINIC | Age: 71
End: 2021-04-15
Payer: MEDICARE

## 2021-04-15 VITALS
DIASTOLIC BLOOD PRESSURE: 67 MMHG | RESPIRATION RATE: 18 BRPM | TEMPERATURE: 98 F | BODY MASS INDEX: 33.72 KG/M2 | HEART RATE: 75 BPM | SYSTOLIC BLOOD PRESSURE: 153 MMHG | WEIGHT: 249 LBS | HEIGHT: 72 IN | OXYGEN SATURATION: 96 %

## 2021-04-15 DIAGNOSIS — R04.0 EPISTAXIS: Primary | ICD-10-CM

## 2021-04-15 PROCEDURE — 99214 OFFICE O/P EST MOD 30 MIN: CPT | Mod: S$GLB,,, | Performed by: PHYSICIAN ASSISTANT

## 2021-04-15 PROCEDURE — 99214 PR OFFICE/OUTPT VISIT, EST, LEVL IV, 30-39 MIN: ICD-10-PCS | Mod: S$GLB,,, | Performed by: PHYSICIAN ASSISTANT

## 2021-06-14 ENCOUNTER — PATIENT MESSAGE (OUTPATIENT)
Dept: FAMILY MEDICINE | Facility: CLINIC | Age: 71
End: 2021-06-14

## 2021-06-22 ENCOUNTER — PATIENT MESSAGE (OUTPATIENT)
Dept: FAMILY MEDICINE | Facility: CLINIC | Age: 71
End: 2021-06-22

## 2021-07-05 ENCOUNTER — PATIENT MESSAGE (OUTPATIENT)
Dept: SPORTS MEDICINE | Facility: CLINIC | Age: 71
End: 2021-07-05

## 2021-07-07 ENCOUNTER — OFFICE VISIT (OUTPATIENT)
Dept: FAMILY MEDICINE | Facility: CLINIC | Age: 71
End: 2021-07-07
Payer: MEDICARE

## 2021-07-07 VITALS
OXYGEN SATURATION: 97 % | DIASTOLIC BLOOD PRESSURE: 74 MMHG | SYSTOLIC BLOOD PRESSURE: 136 MMHG | WEIGHT: 253.31 LBS | HEIGHT: 72 IN | BODY MASS INDEX: 34.31 KG/M2 | HEART RATE: 87 BPM | TEMPERATURE: 98 F

## 2021-07-07 DIAGNOSIS — I25.10 CORONARY ARTERY DISEASE INVOLVING NATIVE CORONARY ARTERY WITHOUT ANGINA PECTORIS, UNSPECIFIED WHETHER NATIVE OR TRANSPLANTED HEART: ICD-10-CM

## 2021-07-07 DIAGNOSIS — G47.33 OBSTRUCTIVE SLEEP APNEA SYNDROME: ICD-10-CM

## 2021-07-07 DIAGNOSIS — I11.9 HYPERTENSIVE HEART DISEASE WITHOUT HEART FAILURE: ICD-10-CM

## 2021-07-07 DIAGNOSIS — Z01.818 PREOP EXAMINATION: Primary | ICD-10-CM

## 2021-07-07 DIAGNOSIS — M35.3 POLYMYALGIA RHEUMATICA: ICD-10-CM

## 2021-07-07 DIAGNOSIS — H25.9 AGE-RELATED CATARACT OF BOTH EYES, UNSPECIFIED AGE-RELATED CATARACT TYPE: ICD-10-CM

## 2021-07-07 PROCEDURE — 99214 PR OFFICE/OUTPT VISIT, EST, LEVL IV, 30-39 MIN: ICD-10-PCS | Mod: S$GLB,,, | Performed by: INTERNAL MEDICINE

## 2021-07-07 PROCEDURE — 99214 OFFICE O/P EST MOD 30 MIN: CPT | Mod: S$GLB,,, | Performed by: INTERNAL MEDICINE

## 2021-07-07 PROCEDURE — 99999 PR PBB SHADOW E&M-EST. PATIENT-LVL V: ICD-10-PCS | Mod: PBBFAC,,, | Performed by: INTERNAL MEDICINE

## 2021-07-07 PROCEDURE — 99999 PR PBB SHADOW E&M-EST. PATIENT-LVL V: CPT | Mod: PBBFAC,,, | Performed by: INTERNAL MEDICINE

## 2021-07-07 RX ORDER — FLUCONAZOLE 200 MG/1
200 TABLET ORAL
COMMUNITY
Start: 2021-07-02 | End: 2022-05-09

## 2021-08-10 ENCOUNTER — PATIENT MESSAGE (OUTPATIENT)
Dept: FAMILY MEDICINE | Facility: CLINIC | Age: 71
End: 2021-08-10

## 2021-08-11 ENCOUNTER — PATIENT MESSAGE (OUTPATIENT)
Dept: FAMILY MEDICINE | Facility: CLINIC | Age: 71
End: 2021-08-11

## 2021-08-11 ENCOUNTER — LAB VISIT (OUTPATIENT)
Dept: FAMILY MEDICINE | Facility: CLINIC | Age: 71
End: 2021-08-11
Payer: MEDICARE

## 2021-08-11 DIAGNOSIS — R05.9 COUGH: ICD-10-CM

## 2021-08-11 PROCEDURE — U0005 INFEC AGEN DETEC AMPLI PROBE: HCPCS | Performed by: NURSE PRACTITIONER

## 2021-08-11 PROCEDURE — U0003 INFECTIOUS AGENT DETECTION BY NUCLEIC ACID (DNA OR RNA); SEVERE ACUTE RESPIRATORY SYNDROME CORONAVIRUS 2 (SARS-COV-2) (CORONAVIRUS DISEASE [COVID-19]), AMPLIFIED PROBE TECHNIQUE, MAKING USE OF HIGH THROUGHPUT TECHNOLOGIES AS DESCRIBED BY CMS-2020-01-R: HCPCS | Performed by: NURSE PRACTITIONER

## 2021-08-12 LAB
SARS-COV-2 RNA RESP QL NAA+PROBE: NOT DETECTED
SARS-COV-2- CYCLE NUMBER: -1

## 2021-08-20 ENCOUNTER — TELEPHONE (OUTPATIENT)
Dept: UROLOGY | Facility: CLINIC | Age: 71
End: 2021-08-20

## 2021-10-05 ENCOUNTER — PATIENT OUTREACH (OUTPATIENT)
Dept: ADMINISTRATIVE | Facility: OTHER | Age: 71
End: 2021-10-05

## 2021-10-06 ENCOUNTER — OFFICE VISIT (OUTPATIENT)
Dept: CARDIOLOGY | Facility: CLINIC | Age: 71
End: 2021-10-06
Payer: MEDICARE

## 2021-10-06 ENCOUNTER — PATIENT MESSAGE (OUTPATIENT)
Dept: FAMILY MEDICINE | Facility: CLINIC | Age: 71
End: 2021-10-06

## 2021-10-06 ENCOUNTER — PATIENT MESSAGE (OUTPATIENT)
Dept: SPORTS MEDICINE | Facility: CLINIC | Age: 71
End: 2021-10-06

## 2021-10-06 ENCOUNTER — OFFICE VISIT (OUTPATIENT)
Dept: UROLOGY | Facility: CLINIC | Age: 71
End: 2021-10-06
Payer: MEDICARE

## 2021-10-06 VITALS
OXYGEN SATURATION: 96 % | DIASTOLIC BLOOD PRESSURE: 82 MMHG | SYSTOLIC BLOOD PRESSURE: 142 MMHG | WEIGHT: 237.63 LBS | HEIGHT: 73 IN | BODY MASS INDEX: 31.49 KG/M2 | HEART RATE: 86 BPM

## 2021-10-06 VITALS
WEIGHT: 237.44 LBS | SYSTOLIC BLOOD PRESSURE: 142 MMHG | BODY MASS INDEX: 31.47 KG/M2 | HEIGHT: 73 IN | HEART RATE: 72 BPM | DIASTOLIC BLOOD PRESSURE: 86 MMHG

## 2021-10-06 DIAGNOSIS — R31.29 MICROSCOPIC HEMATURIA: ICD-10-CM

## 2021-10-06 DIAGNOSIS — N62 GYNECOMASTIA, MALE: ICD-10-CM

## 2021-10-06 DIAGNOSIS — R35.0 URINARY FREQUENCY: ICD-10-CM

## 2021-10-06 DIAGNOSIS — M79.675 TOE PAIN, LEFT: ICD-10-CM

## 2021-10-06 DIAGNOSIS — I11.9 HYPERTENSIVE HEART DISEASE WITHOUT HEART FAILURE: ICD-10-CM

## 2021-10-06 DIAGNOSIS — I25.10 CORONARY ARTERY DISEASE INVOLVING NATIVE CORONARY ARTERY OF NATIVE HEART WITHOUT ANGINA PECTORIS: Primary | ICD-10-CM

## 2021-10-06 DIAGNOSIS — Z95.1 HX OF CABG: ICD-10-CM

## 2021-10-06 DIAGNOSIS — N40.1 BENIGN PROSTATIC HYPERPLASIA WITH WEAK URINARY STREAM: Primary | ICD-10-CM

## 2021-10-06 DIAGNOSIS — R35.1 NOCTURIA: ICD-10-CM

## 2021-10-06 DIAGNOSIS — B35.1 ONYCHOMYCOSIS: Primary | ICD-10-CM

## 2021-10-06 DIAGNOSIS — R39.12 BENIGN PROSTATIC HYPERPLASIA WITH WEAK URINARY STREAM: Primary | ICD-10-CM

## 2021-10-06 DIAGNOSIS — E78.2 MIXED HYPERLIPIDEMIA: ICD-10-CM

## 2021-10-06 DIAGNOSIS — I10 ESSENTIAL HYPERTENSION: ICD-10-CM

## 2021-10-06 PROCEDURE — 99214 OFFICE O/P EST MOD 30 MIN: CPT | Mod: S$GLB,,, | Performed by: INTERNAL MEDICINE

## 2021-10-06 PROCEDURE — 99214 OFFICE O/P EST MOD 30 MIN: CPT | Mod: S$GLB,,, | Performed by: UROLOGY

## 2021-10-06 PROCEDURE — 99214 PR OFFICE/OUTPT VISIT, EST, LEVL IV, 30-39 MIN: ICD-10-PCS | Mod: S$GLB,,, | Performed by: UROLOGY

## 2021-10-06 PROCEDURE — 99999 PR PBB SHADOW E&M-EST. PATIENT-LVL IV: CPT | Mod: PBBFAC,,, | Performed by: INTERNAL MEDICINE

## 2021-10-06 PROCEDURE — 99999 PR PBB SHADOW E&M-EST. PATIENT-LVL IV: ICD-10-PCS | Mod: PBBFAC,,, | Performed by: INTERNAL MEDICINE

## 2021-10-06 PROCEDURE — 99214 PR OFFICE/OUTPT VISIT, EST, LEVL IV, 30-39 MIN: ICD-10-PCS | Mod: S$GLB,,, | Performed by: INTERNAL MEDICINE

## 2021-10-06 PROCEDURE — 99999 PR PBB SHADOW E&M-EST. PATIENT-LVL IV: CPT | Mod: PBBFAC,,, | Performed by: UROLOGY

## 2021-10-06 PROCEDURE — 99999 PR PBB SHADOW E&M-EST. PATIENT-LVL IV: ICD-10-PCS | Mod: PBBFAC,,, | Performed by: UROLOGY

## 2021-10-06 RX ORDER — DUTASTERIDE 0.5 MG/1
0.5 CAPSULE, LIQUID FILLED ORAL DAILY
Qty: 90 CAPSULE | Refills: 3 | Status: SHIPPED | OUTPATIENT
Start: 2021-10-06 | End: 2023-01-11

## 2021-10-08 ENCOUNTER — PATIENT MESSAGE (OUTPATIENT)
Dept: UROLOGY | Facility: CLINIC | Age: 71
End: 2021-10-08

## 2021-10-11 ENCOUNTER — TELEPHONE (OUTPATIENT)
Dept: UROLOGY | Facility: CLINIC | Age: 71
End: 2021-10-11

## 2021-10-14 ENCOUNTER — OFFICE VISIT (OUTPATIENT)
Dept: PODIATRY | Facility: CLINIC | Age: 71
End: 2021-10-14
Payer: MEDICARE

## 2021-10-14 ENCOUNTER — TELEPHONE (OUTPATIENT)
Dept: UROLOGY | Facility: CLINIC | Age: 71
End: 2021-10-14

## 2021-10-14 VITALS
BODY MASS INDEX: 31.65 KG/M2 | RESPIRATION RATE: 18 BRPM | DIASTOLIC BLOOD PRESSURE: 80 MMHG | HEART RATE: 88 BPM | WEIGHT: 238.81 LBS | HEIGHT: 73 IN | OXYGEN SATURATION: 98 % | SYSTOLIC BLOOD PRESSURE: 140 MMHG

## 2021-10-14 DIAGNOSIS — M79.675 TOE PAIN, LEFT: ICD-10-CM

## 2021-10-14 DIAGNOSIS — L60.0 INGROWN NAIL: Primary | ICD-10-CM

## 2021-10-14 DIAGNOSIS — B35.1 ONYCHOMYCOSIS: ICD-10-CM

## 2021-10-14 DIAGNOSIS — R35.0 URINARY FREQUENCY: Primary | ICD-10-CM

## 2021-10-14 PROCEDURE — 99999 PR PBB SHADOW E&M-EST. PATIENT-LVL V: ICD-10-PCS | Mod: PBBFAC,,, | Performed by: PODIATRIST

## 2021-10-14 PROCEDURE — 99999 PR PBB SHADOW E&M-EST. PATIENT-LVL V: CPT | Mod: PBBFAC,,, | Performed by: PODIATRIST

## 2021-10-14 PROCEDURE — 99203 OFFICE O/P NEW LOW 30 MIN: CPT | Mod: S$GLB,,, | Performed by: PODIATRIST

## 2021-10-14 PROCEDURE — 99203 PR OFFICE/OUTPT VISIT, NEW, LEVL III, 30-44 MIN: ICD-10-PCS | Mod: S$GLB,,, | Performed by: PODIATRIST

## 2021-10-19 ENCOUNTER — PATIENT MESSAGE (OUTPATIENT)
Dept: PODIATRY | Facility: CLINIC | Age: 71
End: 2021-10-19
Payer: MEDICARE

## 2021-11-01 ENCOUNTER — TELEPHONE (OUTPATIENT)
Dept: CARDIOLOGY | Facility: CLINIC | Age: 71
End: 2021-11-01
Payer: MEDICARE

## 2021-11-08 ENCOUNTER — CLINICAL SUPPORT (OUTPATIENT)
Dept: AUDIOLOGY | Facility: CLINIC | Age: 71
End: 2021-11-08
Payer: MEDICARE

## 2021-11-08 ENCOUNTER — OFFICE VISIT (OUTPATIENT)
Dept: OTOLARYNGOLOGY | Facility: CLINIC | Age: 71
End: 2021-11-08
Payer: MEDICARE

## 2021-11-08 VITALS — SYSTOLIC BLOOD PRESSURE: 151 MMHG | DIASTOLIC BLOOD PRESSURE: 80 MMHG | HEART RATE: 78 BPM

## 2021-11-08 DIAGNOSIS — H90.3 HEARING LOSS, SENSORINEURAL, HIGH FREQUENCY, BILATERAL: ICD-10-CM

## 2021-11-08 DIAGNOSIS — H91.8X3 ASYMMETRICAL HEARING LOSS: ICD-10-CM

## 2021-11-08 DIAGNOSIS — R42 DIZZINESS: ICD-10-CM

## 2021-11-08 DIAGNOSIS — H93.19 SUBJECTIVE TINNITUS, UNSPECIFIED LATERALITY: ICD-10-CM

## 2021-11-08 DIAGNOSIS — G47.33 OSA ON CPAP: ICD-10-CM

## 2021-11-08 DIAGNOSIS — Z86.69 HISTORY OF SUDDEN HEARING LOSS: Primary | ICD-10-CM

## 2021-11-08 DIAGNOSIS — H90.A21 SENSORINEURAL HEARING LOSS (SNHL) OF RIGHT EAR WITH RESTRICTED HEARING OF LEFT EAR: Primary | ICD-10-CM

## 2021-11-08 DIAGNOSIS — H93.13 TINNITUS, SUBJECTIVE, BILATERAL: ICD-10-CM

## 2021-11-08 DIAGNOSIS — M35.3 PMR (POLYMYALGIA RHEUMATICA): ICD-10-CM

## 2021-11-08 PROCEDURE — 92567 TYMPANOMETRY: CPT | Mod: S$GLB,,,

## 2021-11-08 PROCEDURE — 92557 COMPREHENSIVE HEARING TEST: CPT | Mod: S$GLB,,,

## 2021-11-08 PROCEDURE — 99999 PR PBB SHADOW E&M-EST. PATIENT-LVL III: ICD-10-PCS | Mod: PBBFAC,,, | Performed by: OTOLARYNGOLOGY

## 2021-11-08 PROCEDURE — 99999 PR PBB SHADOW E&M-EST. PATIENT-LVL III: CPT | Mod: PBBFAC,,, | Performed by: OTOLARYNGOLOGY

## 2021-11-08 PROCEDURE — 92567 PR TYMPA2METRY: ICD-10-PCS | Mod: S$GLB,,,

## 2021-11-08 PROCEDURE — 92557 PR COMPREHENSIVE HEARING TEST: ICD-10-PCS | Mod: S$GLB,,,

## 2021-11-08 PROCEDURE — 99213 PR OFFICE/OUTPT VISIT, EST, LEVL III, 20-29 MIN: ICD-10-PCS | Mod: S$GLB,,, | Performed by: OTOLARYNGOLOGY

## 2021-11-08 PROCEDURE — 99213 OFFICE O/P EST LOW 20 MIN: CPT | Mod: S$GLB,,, | Performed by: OTOLARYNGOLOGY

## 2022-01-12 ENCOUNTER — OFFICE VISIT (OUTPATIENT)
Dept: FAMILY MEDICINE | Facility: CLINIC | Age: 72
End: 2022-01-12
Payer: MEDICARE

## 2022-01-12 VITALS
HEIGHT: 73 IN | WEIGHT: 237 LBS | DIASTOLIC BLOOD PRESSURE: 70 MMHG | OXYGEN SATURATION: 98 % | TEMPERATURE: 99 F | HEART RATE: 65 BPM | BODY MASS INDEX: 31.41 KG/M2 | SYSTOLIC BLOOD PRESSURE: 134 MMHG

## 2022-01-12 DIAGNOSIS — N62 GYNECOMASTIA, MALE: ICD-10-CM

## 2022-01-12 DIAGNOSIS — M35.3 POLYMYALGIA RHEUMATICA: ICD-10-CM

## 2022-01-12 DIAGNOSIS — I11.9 HYPERTENSIVE HEART DISEASE WITHOUT HEART FAILURE: ICD-10-CM

## 2022-01-12 DIAGNOSIS — E55.9 VITAMIN D DEFICIENCY: ICD-10-CM

## 2022-01-12 DIAGNOSIS — G47.33 OBSTRUCTIVE SLEEP APNEA SYNDROME: ICD-10-CM

## 2022-01-12 DIAGNOSIS — E78.2 MIXED HYPERLIPIDEMIA: Chronic | ICD-10-CM

## 2022-01-12 DIAGNOSIS — I25.10 CORONARY ARTERY DISEASE INVOLVING NATIVE CORONARY ARTERY OF NATIVE HEART WITHOUT ANGINA PECTORIS: Primary | Chronic | ICD-10-CM

## 2022-01-12 DIAGNOSIS — R39.12 BENIGN PROSTATIC HYPERPLASIA WITH WEAK URINARY STREAM: ICD-10-CM

## 2022-01-12 DIAGNOSIS — E66.01 MORBID OBESITY: ICD-10-CM

## 2022-01-12 DIAGNOSIS — Z12.11 SCREEN FOR COLON CANCER: ICD-10-CM

## 2022-01-12 DIAGNOSIS — N40.1 BENIGN PROSTATIC HYPERPLASIA WITH WEAK URINARY STREAM: ICD-10-CM

## 2022-01-12 PROCEDURE — 99999 PR PBB SHADOW E&M-EST. PATIENT-LVL IV: CPT | Mod: PBBFAC,,, | Performed by: INTERNAL MEDICINE

## 2022-01-12 PROCEDURE — 99214 OFFICE O/P EST MOD 30 MIN: CPT | Mod: S$GLB,,, | Performed by: INTERNAL MEDICINE

## 2022-01-12 PROCEDURE — 99999 PR PBB SHADOW E&M-EST. PATIENT-LVL IV: ICD-10-PCS | Mod: PBBFAC,,, | Performed by: INTERNAL MEDICINE

## 2022-01-12 PROCEDURE — 99214 PR OFFICE/OUTPT VISIT, EST, LEVL IV, 30-39 MIN: ICD-10-PCS | Mod: S$GLB,,, | Performed by: INTERNAL MEDICINE

## 2022-01-12 NOTE — PROGRESS NOTES
[facility] Note    Chief Complaint      Chief Complaint   Patient presents with    Annual Exam       History of Present Illness      Carlos Morgan is a 71 y.o. male with chronic conditions of CAD, HTN, HLD, PMR, BPH, CAROLINE, vit D deficiency who presents today for: annual follow up chronic conditions.  Recently had squamous cell carcinoma of the skin on right dorsal hand resected by Dr. Jacobs.  Sees Dr. Garcia for general dermatology.  CAD: Sees Dr. Hernandez.  Had nuclear stress test this year which was normal. S/P CABG 2007.  On ASA, olmesartan, crestor.    HTN: BP at goal on amlodipine, olmesartan, hydrochlorothiazide.     HLD: Controlled on crestor.  LDL 41 last check, due for repeat.    BPH, gynecomastia: Controlled on avodart.  Sees Dr. Gonzalez.  Off arimidex, previously on to prevent elevated estrogen and worsening breast tissue development.  PMR: Sees Dr. Obrien, PMR.  Started with symptoms of hand pains that disseminated in 2019.  Worsened early 2020.  Had significant improvement with prednisone Summer 2020.  Off prednisone.  Still with soreness after 30 minutes or more of immobility.    CAROLINE: Compliant with CPAP which improves sleep quality and energy levels.  Last CAROLINE evaluation 2005.    Vit D deficiency: Controlled on vit d supplement.  Flu shot UTD.  Prevnar UTD.  Pneumovax UTD.  Zostavax 2014.  COVID vaccine UTD w/ booster.  Shingrix discussed.  Cscope previously 2007.  Previous hx of polyps.  FOBT negative 2021, due for repeat.      Past Medical History:  Past Medical History:   Diagnosis Date    Coronary artery disease     Disorder of kidney and ureter     Hyperlipidemia     Hypertension     Plantar fasciitis     Sleep apnea 2005    cpap, 2005    Urinary tract infection        Past Surgical History:   has a past surgical history that includes Coronary artery bypass graft (2007); Tonsillectomy (1955); Cataract extraction, bilateral (Bilateral, 2021); and Hand surgery  (Right, ).    Family History:  family history includes Cancer in his father; Depression in his mother; Diabetes in his mother; Heart disease in his maternal grandfather; Hypertension in his brother and mother.     Social History:  Social History     Tobacco Use    Smoking status: Former Smoker     Packs/day: 0.50     Years: 10.00     Pack years: 5.00     Types: Cigarettes     Start date: 1967     Quit date: 1983     Years since quittin.0    Smokeless tobacco: Never Used   Substance Use Topics    Alcohol use: Yes     Alcohol/week: 1.0 standard drink     Types: 1 Glasses of wine per week     Comment: socially    Drug use: Never       I personally reviewed all past medical, surgical, social and family history.    Review of Systems   HENT: Positive for hearing loss.    Eyes: Negative for discharge.   Respiratory: Negative for wheezing.    Cardiovascular: Negative for chest pain and palpitations.   Gastrointestinal: Negative for blood in stool, constipation, diarrhea and vomiting.   Genitourinary: Negative for hematuria and urgency.   Musculoskeletal: Negative for neck pain.   Neurological: Positive for weakness. Negative for headaches.   Endo/Heme/Allergies: Positive for polydipsia.        Medications:  Outpatient Encounter Medications as of 2022   Medication Sig Dispense Refill    amLODIPine (NORVASC) 10 MG tablet TAKE 1 TABLET AT BEDTIME 90 tablet 3    aspirin (ECOTRIN) 81 MG EC tablet Take 1 tablet (81 mg total) by mouth once daily. 90 tablet 3    co-enzyme Q-10 30 mg capsule Take 500 mg by mouth once daily.       dutasteride (AVODART) 0.5 mg capsule Take 1 capsule (0.5 mg total) by mouth once daily. 90 capsule 3    flaxseed oil 1,000 mg Cap Take by mouth.      hydroCHLOROthiazide (MICROZIDE) 12.5 mg capsule TAKE 1 CAPSULE BEFORE      BREAKFAST 90 capsule 3    olmesartan (BENICAR) 40 MG tablet TAKE 1 TABLET ONCE DAILY 90 tablet 1    rosuvastatin (CRESTOR) 20 MG tablet TAKE ONE  TABLET BY MOUTH NIGHTLY AT BEDTIME 90 tablet 1    vitamin D (VITAMIN D3) 1000 units Tab Take 185 mg by mouth once daily.       fluconazole (DIFLUCAN) 200 MG Tab Take 200 mg by mouth as needed.       [DISCONTINUED] ciclopirox (LOPROX) 0.77 % Susp Apply topically 2 (two) times daily. (Patient not taking: Reported on 1/12/2022) 60 mL 2    [DISCONTINUED] ciclopirox (PENLAC) 8 % Soln Apply topically nightly. (Patient not taking: Reported on 1/12/2022) 6.6 mL 11    [DISCONTINUED] predniSONE (DELTASONE) 2.5 MG tablet Take 1 tablet (2.5 mg total) by mouth once daily. (Patient not taking: Reported on 1/12/2022) 180 tablet 1     Facility-Administered Encounter Medications as of 1/12/2022   Medication Dose Route Frequency Provider Last Rate Last Admin    [DISCONTINUED] triamcinolone acetonide injection 40 mg  40 mg INTRABURSAL 1 time in Clinic/HOD Saad Obrien DO           Allergies:  Review of patient's allergies indicates:   Allergen Reactions    Lisinopril Other (See Comments)     Hearing loss       Health Maintenance:  Immunization History   Administered Date(s) Administered    COVID-19, MRNA, LN-S, PF (MODERNA FULL 0.5 ML DOSE) 01/28/2021, 02/25/2021, 11/21/2021    Influenza (FLUAD) - Quadrivalent - Adjuvanted - PF *Preferred* (65+) 09/18/2020, 11/05/2021    Influenza (FLUAD) - Trivalent - Adjuvanted - PF (65+) 10/08/2019    Influenza - High Dose - PF (65 years and older) 10/31/2014, 10/06/2015, 11/03/2016, 10/09/2017, 10/04/2018    Influenza - Quadrivalent - PF *Preferred* (6 months and older) 12/03/2021    Pneumococcal Conjugate - 13 Valent 11/05/2018    Pneumococcal Polysaccharide - 23 Valent 11/18/2014    Zoster 11/18/2014      Health Maintenance   Topic Date Due    Aspirin/Antiplatelet Therapy  02/22/2020    High Dose Statin  12/12/2022    Lipid Panel  07/23/2025    TETANUS VACCINE  10/04/2028    Hepatitis C Screening  Completed    Abdominal Aortic Aneurysm Screening  Completed     "    Physical Exam      Vital Signs  Temp: 98.5 °F (36.9 °C)  Pulse: 65  SpO2: 98 %  BP: 134/70  BP Location: Right arm  Patient Position: Sitting  Pain Score: 0-No pain  Height and Weight  Height: 6' 1" (185.4 cm)  Weight: 107.5 kg (236 lb 15.9 oz)  BSA (Calculated - sq m): 2.35 sq meters  BMI (Calculated): 31.3  Weight in (lb) to have BMI = 25: 189.1]    Physical Exam  Vitals reviewed.   Constitutional:       Appearance: He is well-developed and well-nourished.   HENT:      Head: Normocephalic and atraumatic.      Right Ear: External ear normal.      Left Ear: External ear normal.      Mouth/Throat:      Mouth: Oropharynx is clear and moist.   Cardiovascular:      Rate and Rhythm: Normal rate and regular rhythm.      Heart sounds: Normal heart sounds. No murmur heard.      Pulmonary:      Effort: Pulmonary effort is normal.      Breath sounds: Normal breath sounds. No wheezing or rales.   Abdominal:      General: Bowel sounds are normal.      Palpations: Abdomen is soft.          Laboratory:  CBC:  Recent Labs   Lab 06/17/19  0731 06/17/19  0731 06/22/20  1418 06/22/20  1418 07/23/20  0831   WBC 6.23   < > 8.50   < > 7.44   RBC 5.00   < > 4.35 L   < > 4.27 L   Hemoglobin 15.6   < > 12.6 L   < > 12.6 L   Hematocrit 45.4   < > 39.6 L   < > 39.1 L   Platelets 210   < > 334   < > 278   MCV 91   < > 91   < > 92   MCH 31.2 H   < > 29.0   < > 29.5   MCHC 34.4  --  31.8 L  --  32.2    < > = values in this interval not displayed.     CMP:  Recent Labs   Lab 11/07/19  0623 11/07/19  0623 06/24/20  0754 06/24/20  0754 07/23/20  0831   Glucose 100   < >  --   --  96   Calcium 9.3   < >  --   --  9.5   Albumin 4.3   < > 4.5   < > 4.2   Total Protein 6.7   < > 7.6   < > 6.7   Sodium 141   < >  --   --  143   Potassium 4.0   < >  --   --  3.7   CO2 29   < >  --   --  27   Chloride 107   < >  --   --  106   BUN 17   < >  --   --  25 H   Alkaline Phosphatase 55   < > 72   < > 58   ALT 25   < > 27   < > 21   AST 29   < > 28   < > 22 "   Total Bilirubin 1.5 H  --  0.6  --  0.8    < > = values in this interval not displayed.     URINALYSIS:  Recent Labs   Lab 10/14/21  1051   Color, UA Yellow   Specific Gravity, UA 1.025   pH, UA 8.0   Protein, UA Negative   Nitrite, UA Negative   Leukocytes, UA Negative   Urobilinogen, UA Negative      LIPIDS:  Recent Labs   Lab 07/17/19  0808 11/07/19  0623 07/23/20  0831   HDL 39 L 45 51   Cholesterol 109 L 124 111 L   Triglycerides 75 87 93   LDL Cholesterol 55.0 L 61.6 L 41.4 L   HDL/Cholesterol Ratio 35.8 36.3 45.9   Non-HDL Cholesterol 70 79 60   Total Cholesterol/HDL Ratio 2.8 2.8 2.2     TSH:      A1C:  Recent Labs   Lab 06/17/19  0731   Hemoglobin A1C 5.3       Assessment/Plan     Carlos Morgan is a 71 y.o.male with:    1. Coronary artery disease involving native coronary artery of native heart without angina pectoris  - CBC Auto Differential; Future  Continue current meds.  F/u with Dr. Hernandez.   2. Hypertensive heart disease without heart failure  - Comprehensive Metabolic Panel; Future  Continue current meds.    3. Mixed hyperlipidemia  - Comprehensive Metabolic Panel; Future  - Lipid Panel; Future  Continue current meds.  Update labs  4. Benign prostatic hyperplasia with weak urinary stream  5. Gynecomastia, male  Continue current meds.  F/U with Dr. Gonzalez.  6. Polymyalgia rheumatica  Cont to monitor for recurrence.   7. Obstructive sleep apnea syndrome  - Ambulatory referral/consult to Sleep Disorders; Future  Refer to sleep medicine for CPAP setting evaluation.  Hasn't had since 2005.  8. Morbid obesity  Counseled on diet and exercise  9. Vitamin D deficiency  - CALCITRIOL; Future  Cont supplement.  Update labs.  10. Screen for colon cancer  - Fecal Immunochemical Test (iFOBT); Future       Chronic conditions status updated as per HPI.  Other than changes above, cont current medications and maintain follow up with specialists.  Follow up in about 1 year (around 1/12/2023) for Annual  preventative visit.    No future appointments.    Saad Bella MD  [facility]    Answers for HPI/ROS submitted by the patient on 1/11/2022  activity change: Yes  unexpected weight change: No  rhinorrhea: No  trouble swallowing: No  visual disturbance: Yes  chest tightness: No  polyuria: Yes  difficulty urinating: No  joint swelling: No  arthralgias: Yes  confusion: No  dysphoric mood: No

## 2022-01-17 ENCOUNTER — LAB VISIT (OUTPATIENT)
Dept: LAB | Facility: HOSPITAL | Age: 72
End: 2022-01-17
Attending: INTERNAL MEDICINE
Payer: MEDICARE

## 2022-01-17 DIAGNOSIS — Z12.11 SCREEN FOR COLON CANCER: ICD-10-CM

## 2022-01-17 PROCEDURE — 82274 ASSAY TEST FOR BLOOD FECAL: CPT | Performed by: INTERNAL MEDICINE

## 2022-01-25 LAB — HEMOCCULT STL QL IA: NEGATIVE

## 2022-01-26 ENCOUNTER — PATIENT MESSAGE (OUTPATIENT)
Dept: FAMILY MEDICINE | Facility: CLINIC | Age: 72
End: 2022-01-26
Payer: MEDICARE

## 2022-01-27 ENCOUNTER — PATIENT OUTREACH (OUTPATIENT)
Dept: ADMINISTRATIVE | Facility: OTHER | Age: 72
End: 2022-01-27
Payer: MEDICARE

## 2022-01-27 NOTE — PROGRESS NOTES
Health Maintenance Due   Topic Date Due    Shingles Vaccine (2 of 3) 01/13/2015    Aspirin/Antiplatelet Therapy  02/22/2020     Updates were requested from care everywhere.  Chart was reviewed for overdue Proactive [facility] En[facility] (Е[facility]) topics (CRS, Breast Cancer Screening, Eye exam)  Health Maintenance has been updated.  LINKS immunization registry triggered.  Immunizations were reconciled.

## 2022-01-28 ENCOUNTER — OFFICE VISIT (OUTPATIENT)
Dept: SLEEP MEDICINE | Facility: CLINIC | Age: 72
End: 2022-01-28
Payer: MEDICARE

## 2022-01-28 ENCOUNTER — TELEPHONE (OUTPATIENT)
Dept: SLEEP MEDICINE | Facility: CLINIC | Age: 72
End: 2022-01-28
Payer: MEDICARE

## 2022-01-28 ENCOUNTER — PATIENT MESSAGE (OUTPATIENT)
Dept: SLEEP MEDICINE | Facility: CLINIC | Age: 72
End: 2022-01-28
Payer: MEDICARE

## 2022-01-28 VITALS — BODY MASS INDEX: 31.83 KG/M2 | WEIGHT: 235 LBS | HEIGHT: 72 IN

## 2022-01-28 DIAGNOSIS — G47.33 OBSTRUCTIVE SLEEP APNEA SYNDROME: ICD-10-CM

## 2022-01-28 PROCEDURE — 3008F BODY MASS INDEX DOCD: CPT | Mod: CPTII,95,, | Performed by: NURSE PRACTITIONER

## 2022-01-28 PROCEDURE — 99204 OFFICE O/P NEW MOD 45 MIN: CPT | Mod: 95,,, | Performed by: NURSE PRACTITIONER

## 2022-01-28 PROCEDURE — 3008F PR BODY MASS INDEX (BMI) DOCUMENTED: ICD-10-PCS | Mod: CPTII,95,, | Performed by: NURSE PRACTITIONER

## 2022-01-28 PROCEDURE — 99204 PR OFFICE/OUTPT VISIT, NEW, LEVL IV, 45-59 MIN: ICD-10-PCS | Mod: 95,,, | Performed by: NURSE PRACTITIONER

## 2022-01-28 NOTE — TELEPHONE ENCOUNTER
Staff reached out to the pt and he did not answer, so I left a message to confirm is appt for today.

## 2022-01-28 NOTE — PROGRESS NOTES
The patient location is: home/[place]  The chief complaint leading to consultation is: CAROLINE mgt  Visit type: TELE AUDIOVISUAL:[id number]    Face to Face time with patient: 15 minutes of total time spent on the encounter, which includes face to face time and non-face to face time preparing to see the patient (eg, review of tests), Obtaining and/or reviewing separately obtained history, Documenting clinical information in the electronic or other health record, Independently interpreting results (not separately reported) and communicating results to the patient/family/caregiver, or Care coordination (not separately reported).Each patient to whom he or she provides medical services by telemedicine is:  (1) informed of the relationship between the physician and patient and the respective role of any other health care provider with respect to management of the patient; and (2) notified that he or she may decline to receive medical services by telemedicine and may withdraw from such care at any time.    Referred by Dr. Bella    HISTORY OF PRESENT ILLNESS: He was diagnosed with sleep apnea by study done in [place] 2005. Remains adherent with CPAP since this time. Snoring resolved and continued resolution of daytime sleepiness (chronic since age 4). Using borrowed machine (old white sleep style machine)  from friend 8yr ago at his settings, and sleeping fine. No study reports available. Not using humidifier, using nose mask w/o chin strap     On todays [place] Sleepiness Scale the patient scores a 14/24    SH: .     Interrogation- sleep style cpap 9cm.     PHYSICAL EXAM:   Ht 6' (1.829 m)   Wt 106.6 kg (235 lb)   BMI 31.87 kg/m²       ASSESSMENT:   CAROLINE, previous dx 2005. Remains adherent with PAP, benefits from therapy  He has  medical comorbidities of hypertension, CAD hx CABG. Warrants further investigation for untreated sleep apnea.     PLAN:   1.  HST (unable to obtain old study), continue cpap 9cm in interim until get  NEW machine with AHI capability

## 2022-02-01 ENCOUNTER — PATIENT MESSAGE (OUTPATIENT)
Dept: INTERNAL MEDICINE | Facility: CLINIC | Age: 72
End: 2022-02-01
Payer: MEDICARE

## 2022-02-04 ENCOUNTER — TELEPHONE (OUTPATIENT)
Dept: SLEEP MEDICINE | Facility: OTHER | Age: 72
End: 2022-02-04
Payer: MEDICARE

## 2022-02-08 ENCOUNTER — TELEPHONE (OUTPATIENT)
Dept: SLEEP MEDICINE | Facility: OTHER | Age: 72
End: 2022-02-08
Payer: MEDICARE

## 2022-02-09 ENCOUNTER — HOSPITAL ENCOUNTER (OUTPATIENT)
Dept: SLEEP MEDICINE | Facility: OTHER | Age: 72
Discharge: HOME | End: 2022-02-09
Attending: NURSE PRACTITIONER
Payer: MEDICARE

## 2022-02-09 DIAGNOSIS — G47.33 OBSTRUCTIVE SLEEP APNEA SYNDROME: ICD-10-CM

## 2022-02-09 PROCEDURE — 95806 SLEEP STUDY UNATT&RESP EFFT: CPT | Mod: 26,,, | Performed by: PSYCHIATRY & NEUROLOGY

## 2022-02-09 PROCEDURE — 95800 SLP STDY UNATTENDED: CPT

## 2022-02-09 PROCEDURE — 95806 PR SLEEP STUDY, UNATTENDED, SIMUL RECORD HR/O2 SAT/RESP FLOW/RESP EFFT: ICD-10-PCS | Mod: 26,,, | Performed by: PSYCHIATRY & NEUROLOGY

## 2022-02-09 NOTE — PROGRESS NOTES
Per physician orders, patient was given home sleep testing device and instructed on how to apply the device before going to bed tonight. I sized the device and showed the patient using a mirror how the device fits and what it should look like so they can use a mirror when putting it on themselves at home. We reviewed the instruction booklet and the number to call if they have any questions at night. Patient understood and was instructed to return the device the next day back to the sleep lab.

## 2022-02-14 ENCOUNTER — PATIENT MESSAGE (OUTPATIENT)
Dept: SLEEP MEDICINE | Facility: CLINIC | Age: 72
End: 2022-02-14
Payer: MEDICARE

## 2022-02-14 NOTE — PROCEDURES
PHYSICIAN INTERPRETATION AND COMMENTS: Findings are consistent with severe, obstructive sleep apnea (CAROLINE) (G47.33),  by overall AHI (apnea hypopnea index). 2. There is elevated concern for sleep related hypoxemia or hypoventilation, based  on finding of large %time SpO2 <90%. This study was technically adequate to allow for interpretation.  CLINICAL HISTORY: 71 year old male presented with: 18 inch neck, BMI of 30.5, an [place] sleepiness score of 12, history of  hypertension, heart disease, a previous diagnosis of CAROLINE and symptoms of nocturnal snoring. Based on the clinical  history, the patient has a high pre-test probability of having Severe CAROLINE.  SLEEP STUDY FINDINGS: Patient underwent a 1 night Home Sleep Test and by behavioral criteria, slept for approximately  6.28 hours, with a sleep latency of 3 minutes and a sleep efficiency of 89.6%. Severe sleep disordered breathing (AHI=33) is  noted based on a 4% hypopnea desaturation criteria, predominantly in the supine position (41 events/hour). The patient  slept supine 67.8% of the night based on valid recording time of 6.28 hours and is 2.6 times as likely to have  apneas/hypopneas when supine. When considering more subtle measures of sleep disordered breathing, the overall  respiratory disturbance index is severe(RDI=55) based on a 1% hypopnea desaturation criteria with confirmation by  surrogate arousal indicators. The apneas/hypopneas are accompanied by minimal oxygen desaturation (percent time  below 90% SpO2: 1.8%, Min SpO2: 84.2%). The average desaturation across all sleep disordered breathing events is 3.2%.  Snoring occurs for 25.9% (30 dB) of the study, 12.7% is very loud. The mean pulse rate is 65.4 BPM, with very frequent pulse  rate variability (85 events with >= 6 BPM increase/decrease per hour).  TREATMENT CONSIDERATIONS: Consider trial of Auto-titrating CPAP 6-20 cm, mask of patient's choice, and heated  humidification. If patient has difficulty  with CPAP adherence or ongoing CAROLINE symptoms or despite CPAP adherence, then  consider an in-lab titration sleep study in order to determine optimal fixed CPAP setting. Alternatively consider oral  appliance fitted by a dentist specializing in these devices, or surgical consultation for uvulopalatopharyngoplasty (UPPP)  for treatment of obstructive sleep apnea. Consider nasal continuous positive airway pressure (CPAP) as the initial  treatment choice for severe obstructive sleep apnea. Consider an attended in-lab CPAP titration study, given the possibility  of co-morbid sleep related hypoventilation. If CPAP trial is unsuccessful, refer to Sleep Clinic for further evaluation and  management.  DISEASE MANAGEMENT CONSIDERATIONS: Definitive treatment for CAROLINE is recommended. Consider Sleep Clinic referral for  CAROLINE management.  Signature:

## 2022-02-16 DIAGNOSIS — G47.33 OSA (OBSTRUCTIVE SLEEP APNEA): Primary | ICD-10-CM

## 2022-03-22 ENCOUNTER — PATIENT MESSAGE (OUTPATIENT)
Dept: SPORTS MEDICINE | Facility: CLINIC | Age: 72
End: 2022-03-22
Payer: MEDICARE

## 2022-04-04 ENCOUNTER — PATIENT MESSAGE (OUTPATIENT)
Dept: SPORTS MEDICINE | Facility: CLINIC | Age: 72
End: 2022-04-04
Payer: MEDICARE

## 2022-04-14 ENCOUNTER — OFFICE VISIT (OUTPATIENT)
Dept: SPORTS MEDICINE | Facility: CLINIC | Age: 72
End: 2022-04-14
Payer: MEDICARE

## 2022-04-14 DIAGNOSIS — M35.3 POLYMYALGIA RHEUMATICA: Primary | ICD-10-CM

## 2022-04-14 PROCEDURE — 4010F ACE/ARB THERAPY RXD/TAKEN: CPT | Mod: CPTII,95,, | Performed by: ORTHOPAEDIC SURGERY

## 2022-04-14 PROCEDURE — 1160F PR REVIEW ALL MEDS BY PRESCRIBER/CLIN PHARMACIST DOCUMENTED: ICD-10-PCS | Mod: CPTII,95,, | Performed by: ORTHOPAEDIC SURGERY

## 2022-04-14 PROCEDURE — 4010F PR ACE/ARB THEARPY RXD/TAKEN: ICD-10-PCS | Mod: CPTII,95,, | Performed by: ORTHOPAEDIC SURGERY

## 2022-04-14 PROCEDURE — 1160F RVW MEDS BY RX/DR IN RCRD: CPT | Mod: CPTII,95,, | Performed by: ORTHOPAEDIC SURGERY

## 2022-04-14 PROCEDURE — 99214 PR OFFICE/OUTPT VISIT, EST, LEVL IV, 30-39 MIN: ICD-10-PCS | Mod: 95,,, | Performed by: ORTHOPAEDIC SURGERY

## 2022-04-14 PROCEDURE — 1159F PR MEDICATION LIST DOCUMENTED IN MEDICAL RECORD: ICD-10-PCS | Mod: CPTII,95,, | Performed by: ORTHOPAEDIC SURGERY

## 2022-04-14 PROCEDURE — 1159F MED LIST DOCD IN RCRD: CPT | Mod: CPTII,95,, | Performed by: ORTHOPAEDIC SURGERY

## 2022-04-14 PROCEDURE — 99214 OFFICE O/P EST MOD 30 MIN: CPT | Mod: 95,,, | Performed by: ORTHOPAEDIC SURGERY

## 2022-04-14 NOTE — PROGRESS NOTES
Telemedicine/Virtual Visit Documentation:      The patient location is at home in [place], using mobile device, safe     The chief complaint leading to consultation is: see HPI     VISIT TYPE X   Virtual visit with synchronous audio and video X   Telephone E/M service         CC: Follow up on polymyalgia rheumatica     HPI: Abraham is here today via telemedicine to follow up on his polymyalgia rheumatica.  He was able to successfully decrease his dose of prednisone to 2mg daily and stopped this several months ago.  Since that time, he has had a slow return of symptoms of stiffness and muscle soreness when sitting for too long or trying to get out of a chair.  He was also having some pain at night which would wake him up from sleep.  He started taking turmeric which has helped with symptoms.  He also takes Aleve as needed.    REVIEW OF SYSTEMS:   Constitution: Patient denies fever, chills, night sweats, and weight changes.  Eyes: Patient denies eye pain or vision changes.  HENT: Patient denies headache, ear pain, sore throat, or nasal discharge.  CVS: Patient denies chest pain.  Lungs: Patient denies shortness of breath or cough.  Abd: Patient denies stomach pain, nausea, or vomiting.  Skin: Patient denies skin rash or itching.    Hematologic/Lymphatic: Patient denies easy bruising.   Musculoskeletal: Patient denies recent falls. See HPI.  Psych: Patient denies any current anxiety or nervousness.    PAST MEDICAL HISTORY:  Past Medical History:   Diagnosis Date    Coronary artery disease     Disorder of kidney and ureter     Hyperlipidemia     Hypertension     Plantar fasciitis     Sleep apnea 2005    cpap, 2005    Urinary tract infection        FAMILY HISTORY:  Family History   Problem Relation Age of Onset    Depression Mother     Diabetes Mother     Hypertension Mother     Cancer Father         prostate    Hypertension Brother     Heart disease Maternal Grandfather     Prostate cancer Neg Hx      Kidney disease Neg Hx        SURGICAL HISTORY:  Past Surgical History:   Procedure Laterality Date    CATARACT EXTRACTION, BILATERAL Bilateral     CORONARY ARTERY BYPASS GRAFT      HAND SURGERY Right     TONSILLECTOMY         SOCIAL HISTORY:  Social History     Socioeconomic History    Marital status:    Tobacco Use    Smoking status: Former Smoker     Packs/day: 0.50     Years: 10.00     Pack years: 5.00     Types: Cigarettes     Start date: 1967     Quit date: 1983     Years since quittin.3    Smokeless tobacco: Never Used   Substance and Sexual Activity    Alcohol use: Yes     Alcohol/week: 1.0 standard drink     Types: 1 Glasses of wine per week     Comment: socially    Drug use: Never    Sexual activity: Not Currently     Partners: Female       ALLERGIES:  Review of patient's allergies indicates:   Allergen Reactions    Lisinopril Other (See Comments)     Hearing loss         PHYSICAL EXAMINATION:  General: In no acute distress, well developed, well nourished, no diaphoresis  Eyes: EOM full and smooth, no eye redness or discharge  HENT: normocephalic and atraumatic, neck supple, trachea midline, no nasal discharge, no external ear redness or discharge  Lungs: respirations non-labored, no conversational dyspnea   Neuro: AAOx3, CN2-12 grossly intact  Skin: No rashes on face or neck, warm and dry  Psychiatric: cooperative, pleasant, mood and affect appropriate for age      ASSESSMENT:  1. Polymyalgia rheumatica          PLAN:   1. Polymyalgia rheumatica - stable    - Symptoms have improved with turmeric, and it is reasonable to stay on that with as needed Aleve.  He is planning to try this route first before trying prednisone again.    - He has prednisone 2 mg tablets at home and he was advised that is also reason take half to 1 tablet for 7-14 days, then transition to every other day.    Future planning includes -     All questions were answered to the best of my  ability and all concerns were addressed at this time.    This note is dictated using the M*Modal Fluency Direct word recognition program. There are word recognition mistakes that are occasionally missed on review.      Total time spent with patient: see X gela on chart below.   More than half of the time was spent counseling or coordinating care including prognosis, differential diagnosis, risks and benefits of treatment, instructions, compliance risk reductions.    EST MINUTES X   [id number] 5    [id number] 10    [id number] 15    [id number] 25 X   [id number] 40    NEW     82915 10    76918 20    57770 30    [id number] 45    [id number] 60    PHONE      5-10    [id number] 11-20    [id number] 21-30

## 2022-05-02 ENCOUNTER — PATIENT MESSAGE (OUTPATIENT)
Dept: INTERNAL MEDICINE | Facility: CLINIC | Age: 72
End: 2022-05-02
Payer: MEDICARE

## 2022-05-09 ENCOUNTER — OFFICE VISIT (OUTPATIENT)
Dept: INTERNAL MEDICINE | Facility: CLINIC | Age: 72
End: 2022-05-09
Payer: MEDICARE

## 2022-05-09 VITALS
HEART RATE: 90 BPM | BODY MASS INDEX: 31.83 KG/M2 | DIASTOLIC BLOOD PRESSURE: 64 MMHG | SYSTOLIC BLOOD PRESSURE: 126 MMHG | HEIGHT: 72 IN | WEIGHT: 235 LBS | OXYGEN SATURATION: 96 % | TEMPERATURE: 99 F

## 2022-05-09 DIAGNOSIS — S01.111D RIGHT EYELID LACERATION, SUBSEQUENT ENCOUNTER: ICD-10-CM

## 2022-05-09 DIAGNOSIS — I10 ESSENTIAL HYPERTENSION: ICD-10-CM

## 2022-05-09 DIAGNOSIS — Z48.02 VISIT FOR SUTURE REMOVAL: Primary | ICD-10-CM

## 2022-05-09 PROCEDURE — 3074F SYST BP LT 130 MM HG: CPT | Mod: CPTII,S$GLB,, | Performed by: NURSE PRACTITIONER

## 2022-05-09 PROCEDURE — 99999 PR PBB SHADOW E&M-EST. PATIENT-LVL III: CPT | Mod: PBBFAC,,, | Performed by: NURSE PRACTITIONER

## 2022-05-09 PROCEDURE — 1159F PR MEDICATION LIST DOCUMENTED IN MEDICAL RECORD: ICD-10-PCS | Mod: CPTII,S$GLB,, | Performed by: NURSE PRACTITIONER

## 2022-05-09 PROCEDURE — 3288F FALL RISK ASSESSMENT DOCD: CPT | Mod: CPTII,S$GLB,, | Performed by: NURSE PRACTITIONER

## 2022-05-09 PROCEDURE — 1159F MED LIST DOCD IN RCRD: CPT | Mod: CPTII,S$GLB,, | Performed by: NURSE PRACTITIONER

## 2022-05-09 PROCEDURE — 4010F PR ACE/ARB THEARPY RXD/TAKEN: ICD-10-PCS | Mod: CPTII,S$GLB,, | Performed by: NURSE PRACTITIONER

## 2022-05-09 PROCEDURE — 1101F PR PT FALLS ASSESS DOC 0-1 FALLS W/OUT INJ PAST YR: ICD-10-PCS | Mod: CPTII,S$GLB,, | Performed by: NURSE PRACTITIONER

## 2022-05-09 PROCEDURE — 1126F PR PAIN SEVERITY QUANTIFIED, NO PAIN PRESENT: ICD-10-PCS | Mod: CPTII,S$GLB,, | Performed by: NURSE PRACTITIONER

## 2022-05-09 PROCEDURE — 99999 PR PBB SHADOW E&M-EST. PATIENT-LVL III: ICD-10-PCS | Mod: PBBFAC,,, | Performed by: NURSE PRACTITIONER

## 2022-05-09 PROCEDURE — 1101F PT FALLS ASSESS-DOCD LE1/YR: CPT | Mod: CPTII,S$GLB,, | Performed by: NURSE PRACTITIONER

## 2022-05-09 PROCEDURE — 3008F PR BODY MASS INDEX (BMI) DOCUMENTED: ICD-10-PCS | Mod: CPTII,S$GLB,, | Performed by: NURSE PRACTITIONER

## 2022-05-09 PROCEDURE — 3078F PR MOST RECENT DIASTOLIC BLOOD PRESSURE < 80 MM HG: ICD-10-PCS | Mod: CPTII,S$GLB,, | Performed by: NURSE PRACTITIONER

## 2022-05-09 PROCEDURE — 4010F ACE/ARB THERAPY RXD/TAKEN: CPT | Mod: CPTII,S$GLB,, | Performed by: NURSE PRACTITIONER

## 2022-05-09 PROCEDURE — 3074F PR MOST RECENT SYSTOLIC BLOOD PRESSURE < 130 MM HG: ICD-10-PCS | Mod: CPTII,S$GLB,, | Performed by: NURSE PRACTITIONER

## 2022-05-09 PROCEDURE — 99213 OFFICE O/P EST LOW 20 MIN: CPT | Mod: S$GLB,,, | Performed by: NURSE PRACTITIONER

## 2022-05-09 PROCEDURE — 3288F PR FALLS RISK ASSESSMENT DOCUMENTED: ICD-10-PCS | Mod: CPTII,S$GLB,, | Performed by: NURSE PRACTITIONER

## 2022-05-09 PROCEDURE — 3078F DIAST BP <80 MM HG: CPT | Mod: CPTII,S$GLB,, | Performed by: NURSE PRACTITIONER

## 2022-05-09 PROCEDURE — 99213 PR OFFICE/OUTPT VISIT, EST, LEVL III, 20-29 MIN: ICD-10-PCS | Mod: S$GLB,,, | Performed by: NURSE PRACTITIONER

## 2022-05-09 PROCEDURE — 1126F AMNT PAIN NOTED NONE PRSNT: CPT | Mod: CPTII,S$GLB,, | Performed by: NURSE PRACTITIONER

## 2022-05-09 PROCEDURE — 3008F BODY MASS INDEX DOCD: CPT | Mod: CPTII,S$GLB,, | Performed by: NURSE PRACTITIONER

## 2022-05-09 NOTE — PROGRESS NOTES
[facility] Note    Chief Complaint      Chief Complaint   Patient presents with    Suture / Staple Removal     History of Present Illness       Carlos Morgan is a 71 y.o. male patient of Dr. Bella's who is new to me and  presents today for f/u from ER visit and suture removal. Pt feeling well, no complaints, denies fever, visual changes, sob or cp.   3 interrupted sutures removed from right upper eye lid/eye brow w/o difficulty. Pt tolerated well    ER note:  Pt to Ed reports laceration to right brow, hit by 4x4 that fel. Pt denies vision changes, LOC bleeding controlled       This is a 70 y/o WM that presents to the ED with c/o a laceration noted just below his R eyebrow that occurred after being sturck by a 4x4 while working today.  He denies LOC.  He states he debated on whether or not to come but wanted to make sure.  No other injuries or complaints      Health Maintenance   Topic Date Due    Aspirin/Antiplatelet Therapy  02/22/2020    High Dose Statin  05/09/2023    Lipid Panel  01/13/2027    TETANUS VACCINE  05/02/2032    Hepatitis C Screening  Completed    Abdominal Aortic Aneurysm Screening  Completed       Past Medical History:   Diagnosis Date    Coronary artery disease     Disorder of kidney and ureter     Hyperlipidemia     Hypertension     Plantar fasciitis     Sleep apnea 2005    cpap, 2005    Urinary tract infection        Past Surgical History:   Procedure Laterality Date    CATARACT EXTRACTION, BILATERAL Bilateral 2021    CORONARY ARTERY BYPASS GRAFT  2007    HAND SURGERY Right 2021    TONSILLECTOMY  1955       family history includes Cancer in his father; Depression in his mother; Diabetes in his mother; Heart disease in his maternal grandfather; Hypertension in his brother and mother.    Social History     Tobacco Use    Smoking status: Former Smoker     Packs/day: 0.50     Years: 10.00     Pack years: 5.00     Types: Cigarettes     Start date: 8/11/1967      Quit date: 1983     Years since quittin.3    Smokeless tobacco: Never Used   Substance Use Topics    Alcohol use: Yes     Alcohol/week: 1.0 standard drink     Types: 1 Glasses of wine per week     Comment: socially    Drug use: Never       Review of Systems   Constitutional: Negative for chills and fever.   Eyes: Negative for blurred vision, double vision, photophobia, pain, discharge and redness.   Respiratory: Negative for shortness of breath.    Cardiovascular: Negative for chest pain and palpitations.   Gastrointestinal: Negative for nausea.   Neurological: Negative for dizziness and headaches.        Outpatient Encounter Medications as of 2022   Medication Sig Dispense Refill    amLODIPine (NORVASC) 10 MG tablet TAKE 1 TABLET AT BEDTIME 90 tablet 3    co-enzyme Q-10 30 mg capsule Take 500 mg by mouth once daily.       dutasteride (AVODART) 0.5 mg capsule Take 1 capsule (0.5 mg total) by mouth once daily. 90 capsule 3    flaxseed oil 1,000 mg Cap Take by mouth.      hydroCHLOROthiazide (MICROZIDE) 12.5 mg capsule TAKE 1 CAPSULE BEFORE      BREAKFAST 90 capsule 3    olmesartan (BENICAR) 40 MG tablet TAKE 1 TABLET ONCE DAILY 90 tablet 1    rosuvastatin (CRESTOR) 20 MG tablet TAKE ONE TABLET BY MOUTH NIGHTLY AT BEDTIME 90 tablet 1    tumeric-ging-olive-oreg-capryl 100 mg-150 mg- 50 mg-150 mg Cap Take by mouth.      vitamin D (VITAMIN D3) 1000 units Tab Take 185 mg by mouth once daily.       aspirin (ECOTRIN) 81 MG EC tablet Take 1 tablet (81 mg total) by mouth once daily. 90 tablet 3    [DISCONTINUED] fluconazole (DIFLUCAN) 200 MG Tab Take 200 mg by mouth as needed.        No facility-administered encounter medications on file as of 2022.        Review of patient's allergies indicates:   Allergen Reactions    Lisinopril Other (See Comments)     Hearing loss       Physical Exam      Vital Signs  Temp: 98.7 °F (37.1 °C)  Temp src: Oral  Pulse: 90  SpO2: 96 %  BP: 126/64  BP  Location: Left arm  Patient Position: Sitting  Pain Score: 0-No pain  Height and Weight  Height: 6' (182.9 cm)  Weight: 106.6 kg (235 lb 0.2 oz)  BSA (Calculated - sq m): 2.33 sq meters  BMI (Calculated): 31.9  Weight in (lb) to have BMI = 25: 183.9    Physical Exam  Vitals and nursing note reviewed.   Constitutional:       General: He is not in acute distress.     Appearance: Normal appearance. He is not ill-appearing.   HENT:      Head: Normocephalic and atraumatic.   Eyes:     Cardiovascular:      Rate and Rhythm: Normal rate and regular rhythm.      Heart sounds: Normal heart sounds.   Pulmonary:      Effort: Pulmonary effort is normal. No respiratory distress.   Skin:     General: Skin is warm and dry.      Comments: Right upper eye lid with a 3 cm well approximated laceration, redness inferior to the laceration, bruising noted underneath eye- looks much better   Neurological:      Mental Status: He is alert and oriented to person, place, and time.   Psychiatric:         Mood and Affect: Mood normal.         Behavior: Behavior normal.         Thought Content: Thought content normal.         Judgment: Judgment normal.          Laboratory:  CBC:  Lab Results   Component Value Date    WBC 5.88 01/13/2022    RBC 4.61 01/13/2022    HGB 14.0 01/13/2022    HCT 42.2 01/13/2022     01/13/2022    MCV 92 01/13/2022    MCH 30.4 01/13/2022    MCHC 33.2 01/13/2022    MCHC 32.2 07/23/2020    MCHC 31.8 (L) 06/22/2020     CMP:  Lab Results   Component Value Date    GLU 95 01/13/2022    CALCIUM 9.7 01/13/2022    ALBUMIN 4.6 01/13/2022    PROT 7.3 01/13/2022     01/13/2022    K 4.2 01/13/2022    CO2 26 01/13/2022     01/13/2022    BUN 20 01/13/2022    ALKPHOS 61 01/13/2022    ALT 19 01/13/2022    AST 35 01/13/2022    BILITOT 0.9 01/13/2022    BILITOT 0.8 07/23/2020    BILITOT 0.6 06/24/2020     URINALYSIS:  Lab Results   Component Value Date    COLORU Yellow 10/14/2021    SPECGRAV 1.025 10/14/2021    PHUR 8.0  10/14/2021    PROTEINUA Negative 10/14/2021    NITRITE Negative 10/14/2021    LEUKOCYTESUR Negative 10/14/2021    UROBILINOGEN Negative 10/14/2021      LIPIDS:  Lab Results   Component Value Date    TSH 2.650 11/01/2018    TSH 1.561 03/06/2015    HDL 39 (L) 01/13/2022    HDL 51 07/23/2020    HDL 45 11/07/2019    CHOL 102 (L) 01/13/2022    CHOL 111 (L) 07/23/2020    CHOL 124 11/07/2019    TRIG 60 01/13/2022    TRIG 93 07/23/2020    TRIG 87 11/07/2019    LDLCALC 51.0 (L) 01/13/2022    LDLCALC 41.4 (L) 07/23/2020    LDLCALC 61.6 (L) 11/07/2019    CHOLHDL 38.2 01/13/2022    CHOLHDL 45.9 07/23/2020    CHOLHDL 36.3 11/07/2019    NONHDLCHOL 63 01/13/2022    NONHDLCHOL 60 07/23/2020    NONHDLCHOL 79 11/07/2019    TOTALCHOLEST 2.6 01/13/2022    TOTALCHOLEST 2.2 07/23/2020    TOTALCHOLEST 2.8 11/07/2019     TSH:  Lab Results   Component Value Date    TSH 2.650 11/01/2018    TSH 1.561 03/06/2015     A1C:  Lab Results   Component Value Date    HGBA1C 5.3 06/17/2019         Assessment/Plan     Carlos Morgan is a 71 y.o.male with:    Visit for suture removal    Right eyelid laceration, subsequent encounter      Can continue to wash with unscented mil soap and pat dry. Leave emile    I spent 30 minutes on the day of this encounter for preparing for, evaluating, treating, and managing this patient.      -Continue current medications and maintain follow up with specialists.  Return to clinic a needed for any concerns   No follow-ups on file.       MILES Santana  [facility]

## 2022-05-09 NOTE — TELEPHONE ENCOUNTER
Refill Routing Note   Medication(s) are not appropriate for processing by [facility] for the following reason(s):      - Patient has been seen in the ED/Hospital since the last PCP visit    [facility] action(s):  Defer          Medication reconciliation completed: No     Appointments  past 12m or future 3m with PCP    Date Provider   Last Visit   1/12/2022 Saad Bella MD   Next Visit   Visit date not found Saad Bella MD   ED visits in past 90 days: 1        Note composed:6:33 PM 05/09/2022

## 2022-05-09 NOTE — TELEPHONE ENCOUNTER
No new care gaps identified.  [facility] Embedded Care Gaps. Reference number: [id number]. 5/09/2022   5:39:17 PM CDT

## 2022-05-10 RX ORDER — OLMESARTAN MEDOXOMIL 40 MG/1
TABLET ORAL
Qty: 90 TABLET | Refills: 1 | Status: SHIPPED | OUTPATIENT
Start: 2022-05-10 | End: 2023-03-06

## 2022-05-23 NOTE — PATIENT INSTRUCTIONS
Will obtain baseline estrogen level today and repeat PSA due to family history of prostate cancer.  Patient is to notify me if breast continue enlarged or become tender.  Wall only start Arimidex of symptomatic but need a baseline estrogen level.  Patient will continue Avodart daily and follow-up in 6 months and get back on a yearly schedule if everything remains stable.  Will repeat PSA in estrogen at the 6 month visit if everything is stable was just to yearly PSA test after.   ambulate

## 2022-06-21 ENCOUNTER — OFFICE VISIT (OUTPATIENT)
Dept: SLEEP MEDICINE | Facility: CLINIC | Age: 72
End: 2022-06-21
Payer: MEDICARE

## 2022-06-21 VITALS
BODY MASS INDEX: 32.05 KG/M2 | WEIGHT: 236.31 LBS | DIASTOLIC BLOOD PRESSURE: 70 MMHG | SYSTOLIC BLOOD PRESSURE: 131 MMHG | HEART RATE: 92 BPM

## 2022-06-21 DIAGNOSIS — G47.33 OBSTRUCTIVE SLEEP APNEA SYNDROME: ICD-10-CM

## 2022-06-21 DIAGNOSIS — I10 ESSENTIAL HYPERTENSION: Primary | Chronic | ICD-10-CM

## 2022-06-21 DIAGNOSIS — I25.10 CORONARY ARTERY DISEASE INVOLVING NATIVE CORONARY ARTERY OF NATIVE HEART WITHOUT ANGINA PECTORIS: ICD-10-CM

## 2022-06-21 PROCEDURE — 3008F PR BODY MASS INDEX (BMI) DOCUMENTED: ICD-10-PCS | Mod: CPTII,S$GLB,, | Performed by: NURSE PRACTITIONER

## 2022-06-21 PROCEDURE — 3288F FALL RISK ASSESSMENT DOCD: CPT | Mod: CPTII,S$GLB,, | Performed by: NURSE PRACTITIONER

## 2022-06-21 PROCEDURE — 3008F BODY MASS INDEX DOCD: CPT | Mod: CPTII,S$GLB,, | Performed by: NURSE PRACTITIONER

## 2022-06-21 PROCEDURE — 1126F AMNT PAIN NOTED NONE PRSNT: CPT | Mod: CPTII,S$GLB,, | Performed by: NURSE PRACTITIONER

## 2022-06-21 PROCEDURE — 1159F PR MEDICATION LIST DOCUMENTED IN MEDICAL RECORD: ICD-10-PCS | Mod: CPTII,S$GLB,, | Performed by: NURSE PRACTITIONER

## 2022-06-21 PROCEDURE — 1101F PT FALLS ASSESS-DOCD LE1/YR: CPT | Mod: CPTII,S$GLB,, | Performed by: NURSE PRACTITIONER

## 2022-06-21 PROCEDURE — 1126F PR PAIN SEVERITY QUANTIFIED, NO PAIN PRESENT: ICD-10-PCS | Mod: CPTII,S$GLB,, | Performed by: NURSE PRACTITIONER

## 2022-06-21 PROCEDURE — 1101F PR PT FALLS ASSESS DOC 0-1 FALLS W/OUT INJ PAST YR: ICD-10-PCS | Mod: CPTII,S$GLB,, | Performed by: NURSE PRACTITIONER

## 2022-06-21 PROCEDURE — 3078F PR MOST RECENT DIASTOLIC BLOOD PRESSURE < 80 MM HG: ICD-10-PCS | Mod: CPTII,S$GLB,, | Performed by: NURSE PRACTITIONER

## 2022-06-21 PROCEDURE — 4010F ACE/ARB THERAPY RXD/TAKEN: CPT | Mod: CPTII,S$GLB,, | Performed by: NURSE PRACTITIONER

## 2022-06-21 PROCEDURE — 99214 PR OFFICE/OUTPT VISIT, EST, LEVL IV, 30-39 MIN: ICD-10-PCS | Mod: S$GLB,,, | Performed by: NURSE PRACTITIONER

## 2022-06-21 PROCEDURE — 4010F PR ACE/ARB THEARPY RXD/TAKEN: ICD-10-PCS | Mod: CPTII,S$GLB,, | Performed by: NURSE PRACTITIONER

## 2022-06-21 PROCEDURE — 99999 PR PBB SHADOW E&M-EST. PATIENT-LVL III: ICD-10-PCS | Mod: PBBFAC,,, | Performed by: NURSE PRACTITIONER

## 2022-06-21 PROCEDURE — 3078F DIAST BP <80 MM HG: CPT | Mod: CPTII,S$GLB,, | Performed by: NURSE PRACTITIONER

## 2022-06-21 PROCEDURE — 3075F SYST BP GE 130 - 139MM HG: CPT | Mod: CPTII,S$GLB,, | Performed by: NURSE PRACTITIONER

## 2022-06-21 PROCEDURE — 3288F PR FALLS RISK ASSESSMENT DOCUMENTED: ICD-10-PCS | Mod: CPTII,S$GLB,, | Performed by: NURSE PRACTITIONER

## 2022-06-21 PROCEDURE — 1159F MED LIST DOCD IN RCRD: CPT | Mod: CPTII,S$GLB,, | Performed by: NURSE PRACTITIONER

## 2022-06-21 PROCEDURE — 3075F PR MOST RECENT SYSTOLIC BLOOD PRESS GE 130-139MM HG: ICD-10-PCS | Mod: CPTII,S$GLB,, | Performed by: NURSE PRACTITIONER

## 2022-06-21 PROCEDURE — 99214 OFFICE O/P EST MOD 30 MIN: CPT | Mod: S$GLB,,, | Performed by: NURSE PRACTITIONER

## 2022-06-21 PROCEDURE — 99999 PR PBB SHADOW E&M-EST. PATIENT-LVL III: CPT | Mod: PBBFAC,,, | Performed by: NURSE PRACTITIONER

## 2022-06-21 NOTE — PROGRESS NOTES
Cc: CAROLINE mgt     He underwent requal HST 2/2022 revealing ongoing severe CAROLINE and got new cpap machine. Not using humidifier, using nose mask w/o chin strapand finds hose bit more noisier than former one. Sleep more consolidated and doesn't sleep well w/o machine if for instance falls asleep in chair. He uses cpap qhs. Has 2 outdated machines w/o ahi capability he may donate.       Remote 30d avg 8:27h/n AHI 0.6/[place]    EXAM:   /70 (BP Location: Right arm, Patient Position: Sitting, BP Method: Medium (Automatic))   Pulse 92   Wt 107.2 kg (236 lb 5.3 oz)   BMI 32.05 kg/m²     HST 2/2022 AHI 33/Min SpO2: 84.2%    ASSESSMENT:   CAROLINE, severe. Remains adherent with PAP, benefits from therapy. AHI<5. Meeting medicare guidelines  He has  medical comorbidities of hypertension, CAD hx CABG    PLAN:   1. cpap 9cm continue, supplies via [facility]  See cards re: CAD mgt/continue ASA and pcp re: HTN mgt/continue meds  rtc 2 yr, sooner if needed

## 2022-07-18 ENCOUNTER — PATIENT MESSAGE (OUTPATIENT)
Dept: INTERNAL MEDICINE | Facility: CLINIC | Age: 72
End: 2022-07-18
Payer: MEDICARE

## 2022-07-18 NOTE — TELEPHONE ENCOUNTER
"We prescribe everything that is recommended by standard of care.  However, these screenings offer testing that is not usually "standard of care".  If his means allow for it, then I would certainly use any information that was discovered.  "

## 2022-10-20 ENCOUNTER — OFFICE VISIT (OUTPATIENT)
Dept: CARDIOLOGY | Facility: CLINIC | Age: 72
End: 2022-10-20
Payer: MEDICARE

## 2022-10-20 VITALS
SYSTOLIC BLOOD PRESSURE: 148 MMHG | HEIGHT: 72 IN | WEIGHT: 242.75 LBS | BODY MASS INDEX: 32.88 KG/M2 | DIASTOLIC BLOOD PRESSURE: 77 MMHG | HEART RATE: 76 BPM

## 2022-10-20 DIAGNOSIS — E78.2 MIXED HYPERLIPIDEMIA: Chronic | ICD-10-CM

## 2022-10-20 DIAGNOSIS — I25.10 CORONARY ARTERY DISEASE INVOLVING NATIVE CORONARY ARTERY OF NATIVE HEART WITHOUT ANGINA PECTORIS: Primary | Chronic | ICD-10-CM

## 2022-10-20 DIAGNOSIS — Z95.1 HX OF CABG: Chronic | ICD-10-CM

## 2022-10-20 DIAGNOSIS — I10 ESSENTIAL HYPERTENSION: Chronic | ICD-10-CM

## 2022-10-20 PROCEDURE — 3078F DIAST BP <80 MM HG: CPT | Mod: CPTII,S$GLB,, | Performed by: INTERNAL MEDICINE

## 2022-10-20 PROCEDURE — 1126F PR PAIN SEVERITY QUANTIFIED, NO PAIN PRESENT: ICD-10-PCS | Mod: CPTII,S$GLB,, | Performed by: INTERNAL MEDICINE

## 2022-10-20 PROCEDURE — 3077F PR MOST RECENT SYSTOLIC BLOOD PRESSURE >= 140 MM HG: ICD-10-PCS | Mod: CPTII,S$GLB,, | Performed by: INTERNAL MEDICINE

## 2022-10-20 PROCEDURE — 99214 PR OFFICE/OUTPT VISIT, EST, LEVL IV, 30-39 MIN: ICD-10-PCS | Mod: S$GLB,,, | Performed by: INTERNAL MEDICINE

## 2022-10-20 PROCEDURE — 99214 OFFICE O/P EST MOD 30 MIN: CPT | Mod: S$GLB,,, | Performed by: INTERNAL MEDICINE

## 2022-10-20 PROCEDURE — 1159F PR MEDICATION LIST DOCUMENTED IN MEDICAL RECORD: ICD-10-PCS | Mod: CPTII,S$GLB,, | Performed by: INTERNAL MEDICINE

## 2022-10-20 PROCEDURE — 4010F PR ACE/ARB THEARPY RXD/TAKEN: ICD-10-PCS | Mod: CPTII,S$GLB,, | Performed by: INTERNAL MEDICINE

## 2022-10-20 PROCEDURE — 1101F PT FALLS ASSESS-DOCD LE1/YR: CPT | Mod: CPTII,S$GLB,, | Performed by: INTERNAL MEDICINE

## 2022-10-20 PROCEDURE — 3077F SYST BP >= 140 MM HG: CPT | Mod: CPTII,S$GLB,, | Performed by: INTERNAL MEDICINE

## 2022-10-20 PROCEDURE — 3288F PR FALLS RISK ASSESSMENT DOCUMENTED: ICD-10-PCS | Mod: CPTII,S$GLB,, | Performed by: INTERNAL MEDICINE

## 2022-10-20 PROCEDURE — 99999 PR PBB SHADOW E&M-EST. PATIENT-LVL III: CPT | Mod: PBBFAC,,, | Performed by: INTERNAL MEDICINE

## 2022-10-20 PROCEDURE — 1159F MED LIST DOCD IN RCRD: CPT | Mod: CPTII,S$GLB,, | Performed by: INTERNAL MEDICINE

## 2022-10-20 PROCEDURE — 1160F PR REVIEW ALL MEDS BY PRESCRIBER/CLIN PHARMACIST DOCUMENTED: ICD-10-PCS | Mod: CPTII,S$GLB,, | Performed by: INTERNAL MEDICINE

## 2022-10-20 PROCEDURE — 3078F PR MOST RECENT DIASTOLIC BLOOD PRESSURE < 80 MM HG: ICD-10-PCS | Mod: CPTII,S$GLB,, | Performed by: INTERNAL MEDICINE

## 2022-10-20 PROCEDURE — 99999 PR PBB SHADOW E&M-EST. PATIENT-LVL III: ICD-10-PCS | Mod: PBBFAC,,, | Performed by: INTERNAL MEDICINE

## 2022-10-20 PROCEDURE — 4010F ACE/ARB THERAPY RXD/TAKEN: CPT | Mod: CPTII,S$GLB,, | Performed by: INTERNAL MEDICINE

## 2022-10-20 PROCEDURE — 1126F AMNT PAIN NOTED NONE PRSNT: CPT | Mod: CPTII,S$GLB,, | Performed by: INTERNAL MEDICINE

## 2022-10-20 PROCEDURE — 1101F PR PT FALLS ASSESS DOC 0-1 FALLS W/OUT INJ PAST YR: ICD-10-PCS | Mod: CPTII,S$GLB,, | Performed by: INTERNAL MEDICINE

## 2022-10-20 PROCEDURE — 1160F RVW MEDS BY RX/DR IN RCRD: CPT | Mod: CPTII,S$GLB,, | Performed by: INTERNAL MEDICINE

## 2022-10-20 PROCEDURE — 3288F FALL RISK ASSESSMENT DOCD: CPT | Mod: CPTII,S$GLB,, | Performed by: INTERNAL MEDICINE

## 2022-10-20 RX ORDER — ROSUVASTATIN CALCIUM 20 MG/1
20 TABLET, COATED ORAL DAILY
Qty: 90 TABLET | Refills: 3
Start: 2022-10-20 | End: 2023-03-07

## 2022-10-20 NOTE — PATIENT INSTRUCTIONS
"I recommend the book, "The Obesity Code" for weight loss; it recommends intermittent fasting and avoidance of sugar, artificial sweeteners and refined carbohydrates.    Also, here is information on a Mediterranean type diet including fish, the pesco-mediterranean diet from the American College of Cardiology:    1.  Humans are evolutionarily adapted to obtain calories and nutrients from both plant and animal food sources. Many people overconsume animal products, often-processed meats high in saturated fats and chemical additives. In contrast, while strict veganism has gained popularity for many reasons and has value in certain groups, it can cause nutritional deficiencies (vitamin B12, high-quality proteins, iron, zinc, omega-3 fatty acid, vitamin D, and calcium), and predispose to osteopenia, loss of muscle mass, and anemia. This is not true of a lacto-ovo vegetarian diet, which allows no animal-based food except for eggs and dairy. A 6-year study of [id number] [place] [employer] reported a decreased incidence of all-cause mortality when comparing vegetarians with nonvegetarians. However, when the vegetarians were stratified into vegans, lacto-ovo vegetarians, pesco-vegetarians, and semi-vegetarians, the pesco-vegetarians had lowest risks for all-cause mortality, cardiovascular disease (CVD) mortality, and mortality from other causes.     2.  The authors propose a plant-rich diet rich in nuts with fish and seafood as the principle source of animal food. Known as the Pesco-Mediterranean diet, it is supplemented with extra-virgin olive oil (EVOO), which is the principle fat source, along with moderate amounts of dairy (particularly yogurt and cheese) and eggs, as well as modest amounts of alcohol consumption (ideally red wine with the evening meal), but few red and processed meats.     3.  Both epidemiological studies and randomized clinical trials indicate that the traditional Mediterranean diet is associated with " lower risks for all-cause and CVD mortality, coronary heart disease, metabolic syndrome, diabetes, cognitive decline, neurodegenerative diseases (including Alzheimers), depression, overall cancer mortality, and breast and colorectal cancers.     4.  The traditional Mediterranean diet has been endorsed in the most recent Dietary Guidelines for Americans and the American College of Cardiology/American Heart Association guidelines. The 2020 U.[employer]. News & World Report ranked it #1 for overall health based upon it being nutritious, safe, relatively easy to follow, protective against CVD and diabetes, and effective for weight loss.     5.  Fish and seafood are important sources of vitamins protein and omega-3 fatty acids, of which the higher blood and adipose tissue are associated with reduced fatal and nonfatal myocardial infarction. When not fried, fish consumption has been associated with reduced risk of heart failure, and the incidence of the metabolic syndrome, coronary heart disease, ischemic stroke, and sudden cardiac death, particularly when seafood replaces less healthy foods.     6.  Unrestricted use of olive oil in the kitchen, on salads (with vinegar), cooking vegetables, and at the table is the foundation of the traditional Mediterranean diet, although olive oil quality is crucial, which makes it expensive. EVOO retains hydrophilic components of olives including highly bioactive polyphenols, which are believed to underlie many of EVOOs cardiometabolic benefits, such as reduced low-density lipoprotein cholesterol (LDL-C) and increased high-density lipoprotein cholesterol (HDL-C), improved vascular reactivity, enhanced HDL particle functionality, and a lower diabetes risk.     7.  Tree nuts, an integral component of the traditional Mediterranean diet, are nutrient dense rich in unsaturated fats, fiber, protein, polyphenols, phytosterols, and tocopherols. Nut consumption is associated with decreased incidence  and mortality rates from both CVD and coronary artery disease (CAD), as well as atrial fibrillation and diabetes. Randomized controlled trials have shown that diets enriched with nuts produce cardiometabolic benefits including improvements in insulin sensitivity, LDL-C, inflammation, and vascular reactivity. A 1-daily serving of mixed nuts resulted in a 28% reduction in CVD risk. Generous intake of nuts does not promote weight gain because of increased satiety and incomplete digestion.     8.  Legumes are an excellent source of vegetable protein, folate, and magnesium and fiber, and like other seeds including peanuts, are rich in polyphenols. Consumption of legumes has been linked to a reduced risk of incident and fatal CVD and CAD, as well as improvements in blood glucose, cholesterol, blood pressure, and body weight. Legumes, like fish, are a satiating and healthy substitute for red meat and processed meats.     9.  Dairy products and eggs are important sources of protein, nonsodium minerals, probiotics, and vitamin D. Although there is no clear consensus among nutrition experts on the role of dairy products in CVD risk, they are allowed in this Pesco-Mediterranean diet. Fermented low-fat versions, such as yogurt and soft cheeses, are preferred; butter and hard cheese are high in saturated fats and salt.     10.  Eggs are composed of beneficial nutrients including all essential amino acids, in addition to minerals (selenium, phosphorus, iodine, zinc), vitamins (A, D, B2, B12, niacin), and carotenoids (lutein, zeaxanthin). Although each yolk contains about 184 mg of dietary cholesterol, large prospective cohorts suggest that egg consumption is unrelated to serum cholesterol and does not increase CVD risk. Eggs are allowed in the Pesco-Mediterranean diet; egg whites are unlimited and preferably no more than 5 yolks/week.     11.  Whole grains, such as barley, whole oats, rye, corn, buckwheat, brown rice, and quinoa,  are an integral part of the traditional Mediterranean diet. Pasta is an example of a starchy food that has a low glycemic index despite being a refined carbohydrate. In the context of a low glycemic index dietary pattern such as the Mediterranean diet, pasta does not adversely affect adiposity and may even help reduce body weight and there is no evidence that pasta promotes cardiometabolic risk factors. White rice is associated with type 2 diabetes mellitus in Asians but not in Western cohorts, possibly because it is cooked and served plain in [place] and in Western cultures cooked in mixed dishes with vegetables and vegetable oil including EVOO.     12.  The staple beverage of the Pesco-Mediterranean diet is water--which can be flavored but not sweetened. Unsweetened tea and coffee are rich in antioxidants and are associated with improved CVD outcomes. If alcohol is consumed at all, dry red wine is recommended, with the ideal amount being a single glass (6 oz) for women and 1 or 2 glasses/day for men (6-12 oz) consumed with meals.     13.  Time-restricted eating, a type of intermittent fasting, is the practice of limiting the daily intake of calories to a window of time usually between 6-12 hours each day. Intermittent fasting when done on a regular basis has been shown to decrease intra-abdominal adipose tissue and reduce free-radical production. This elicits powerful cellular responses that improve glucose metabolism and reduce systemic inflammation, and may also reduce risks of diabetes, CVD, cancer, and neurodegenerative diseases. After a 12-hour overnight fast, insulin levels are typically low, and glycogen stores have been depleted. In this fasted state, the body starts mobilizing fatty acids from adipose cells to burn as metabolic fuel instead of glucose. This improves insulin sensitivity. Time-restricted eating is not more effective for weight loss than standard calorie-restriction, but appears to enhance CV  health even in nonobese people. Fasting may also lower blood pressure and resting heart rate and improve autonomic balance with augmented heart rate variability.     14.  The evidence regarding time-restricted eating is mostly based on animal models and observational human studies. The most popular form of time-restricted eating involves eating two rather than three meals and compressing the calorie-consumption window. No head-to-head studies have been performed to assess the optimal time window.

## 2022-10-20 NOTE — PROGRESS NOTES
Subjective:   10/20/2022     Patient ID:  Carlos Morgan is a 72 y.o. male who presents for evaulation of Coronary Artery Disease, Hypertension, and Hyperlipidemia      He comes in for cardiac follow-up.  He underwent coronary artery bypass grafting in 2007.  He has not had chest pains tightness, PND or orthopnea.    Hypertension is present treated with amlodipine, olmesartan and hydrochlorothiazide.  Hearing loss on lisinopril.  He has gained weight, his blood pressures have been mildly elevated at home, 120-140/70-80    Mixed hyperlipidemia is present treated with high-dose statin therapy, rosuvastatin 20 mg nightly.  He did recently decrease rosuvastatin to 1/2 tablet nightly because of aches, he does have polymyalgia rheumatica.  Will try to go back up on does     Recent screening showed no abdominal aneurysm and no carotid stenoses      Patient Active Problem List   Diagnosis    Mixed hyperlipidemia    Coronary artery disease involving native coronary artery of native heart without angina pectoris    Hypertensive heart disease without heart failure    Morbid obesity    Onychomycosis    Vitamin D deficiency    Allergic rhinitis    Fatty liver    Cyst of left kidney    Arthralgia of both hands    Hearing loss of right ear    Tinnitus of right ear    Plantar fasciitis    Low serum HDL    Hx of CABG    Obstructive sleep apnea syndrome    Family history of prostate cancer in father    Benign prostatic hyperplasia with weak urinary stream    Polymyalgia rheumatica    Essential hypertension    Nocturia    Urinary frequency    Gynecomastia, male    Microscopic hematuria        Review of patient's allergies indicates:   Allergen Reactions    Lisinopril Other (See Comments)     Hearing loss         Current Outpatient Medications:     amLODIPine (NORVASC) 10 MG tablet, TAKE 1 TABLET AT BEDTIME, Disp: 90 tablet, Rfl: 3    aspirin (ECOTRIN) 81 MG EC tablet, Take 1 tablet (81 mg total) by mouth once daily., Disp: 90  tablet, Rfl: 3    co-enzyme Q-10 30 mg capsule, Take 500 mg by mouth once daily. , Disp: , Rfl:     dutasteride (AVODART) 0.5 mg capsule, Take 1 capsule (0.5 mg total) by mouth once daily., Disp: 90 capsule, Rfl: 3    flaxseed oil 1,000 mg Cap, Take by mouth., Disp: , Rfl:     hydroCHLOROthiazide (MICROZIDE) 12.5 mg capsule, TAKE 1 CAPSULE BEFORE      BREAKFAST, Disp: 90 capsule, Rfl: 3    olmesartan (BENICAR) 40 MG tablet, TAKE 1 TABLET ONCE DAILY, Disp: 90 tablet, Rfl: 1    vitamin D (VITAMIN D3) 1000 units Tab, Take 185 mg by mouth once daily. , Disp: , Rfl:     rosuvastatin (CRESTOR) 20 MG tablet, Take 1 tablet (20 mg total) by mouth once daily., Disp: 90 tablet, Rfl: 3     Objective:   Review of Systems   Cardiovascular:  Negative for chest pain, claudication, cyanosis, dyspnea on exertion, irregular heartbeat, leg swelling, near-syncope, orthopnea, palpitations, paroxysmal nocturnal dyspnea and syncope.     Vitals:    10/20/22 1307   BP: (!) 148/77   Pulse: 76       Physical Exam  Vitals reviewed.   Constitutional:       General: He is not in acute distress.     Appearance: He is well-developed.   HENT:      Head: Normocephalic and atraumatic.      Nose: Nose normal.   Eyes:      Conjunctiva/sclera: Conjunctivae normal.      Pupils: Pupils are equal, round, and reactive to light.   Neck:      Vascular: No JVD.   Cardiovascular:      Rate and Rhythm: Normal rate and regular rhythm.      Pulses: Intact distal pulses.      Heart sounds: Normal heart sounds. No murmur heard.    No friction rub. No gallop.   Pulmonary:      Effort: Pulmonary effort is normal. No respiratory distress.      Breath sounds: Normal breath sounds. No wheezing or rales.   Chest:      Chest wall: No tenderness.   Abdominal:      General: Bowel sounds are normal. There is no distension.      Palpations: Abdomen is soft.      Tenderness: There is no abdominal tenderness.   Musculoskeletal:         General: No tenderness or deformity. Normal  range of motion.      Cervical back: Normal range of motion and neck supple.   Skin:     General: Skin is warm and dry.      Findings: No erythema or rash.   Neurological:      Mental Status: He is alert and oriented to person, place, and time.      Cranial Nerves: No cranial nerve deficit.      Motor: No abnormal muscle tone.      Coordination: Coordination normal.   Psychiatric:         Behavior: Behavior normal.         Thought Content: Thought content normal.         Judgment: Judgment normal.       Lab Results   Component Value Date    CHOL 102 (L) 01/13/2022    CHOL 111 (L) 07/23/2020    CHOL 124 11/07/2019     Lab Results   Component Value Date    HDL 39 (L) 01/13/2022    HDL 51 07/23/2020    HDL 45 11/07/2019     Lab Results   Component Value Date    LDLCALC 51.0 (L) 01/13/2022    LDLCALC 41.4 (L) 07/23/2020    LDLCALC 61.6 (L) 11/07/2019     Lab Results   Component Value Date    ALT 19 01/13/2022    AST 35 01/13/2022    AST 22 07/23/2020    AST 28 06/24/2020     Lab Results   Component Value Date    CREATININE 0.81 01/13/2022    BUN 20 01/13/2022     01/13/2022    K 4.2 01/13/2022    CO2 26 01/13/2022    CO2 27 07/23/2020    CO2 29 11/07/2019     Lab Results   Component Value Date    HGB 14.0 01/13/2022    HCT 42.2 01/13/2022    HCT 39.1 (L) 07/23/2020    HCT 39.6 (L) 06/22/2020       Assessment and Plan:     Coronary artery disease involving native coronary artery of native heart without angina pectoris  Comments:  Asymptomatic    Mixed hyperlipidemia  Comments:  Try to continue to take rosuvastatin 20 mg nightly, LDL optimal at current levels  Orders:  -     rosuvastatin (CRESTOR) 20 MG tablet; Take 1 tablet (20 mg total) by mouth once daily.  Dispense: 90 tablet; Refill: 3    Hx of CABG  Comments:  In 2007    Essential hypertension  Comments:  Mildly elevated with associated weight gain   Try to lose weight  See after visit summary       Follow up in about 1 year (around  10/20/2023).

## 2022-12-21 ENCOUNTER — OFFICE VISIT (OUTPATIENT)
Dept: PRIMARY CARE CLINIC | Facility: CLINIC | Age: 72
End: 2022-12-21
Payer: MEDICARE

## 2022-12-21 DIAGNOSIS — R09.81 SINUS CONGESTION: Primary | ICD-10-CM

## 2022-12-21 DIAGNOSIS — J32.9 SINUSITIS, UNSPECIFIED CHRONICITY, UNSPECIFIED LOCATION: ICD-10-CM

## 2022-12-21 DIAGNOSIS — J30.2 SEASONAL ALLERGIES: ICD-10-CM

## 2022-12-21 DIAGNOSIS — R05.9 COUGH, UNSPECIFIED TYPE: ICD-10-CM

## 2022-12-21 PROCEDURE — 4010F PR ACE/ARB THEARPY RXD/TAKEN: ICD-10-PCS | Mod: CPTII,95,, | Performed by: NURSE PRACTITIONER

## 2022-12-21 PROCEDURE — 1159F MED LIST DOCD IN RCRD: CPT | Mod: CPTII,95,, | Performed by: NURSE PRACTITIONER

## 2022-12-21 PROCEDURE — 99213 OFFICE O/P EST LOW 20 MIN: CPT | Mod: 95,,, | Performed by: NURSE PRACTITIONER

## 2022-12-21 PROCEDURE — 4010F ACE/ARB THERAPY RXD/TAKEN: CPT | Mod: CPTII,95,, | Performed by: NURSE PRACTITIONER

## 2022-12-21 PROCEDURE — 1159F PR MEDICATION LIST DOCUMENTED IN MEDICAL RECORD: ICD-10-PCS | Mod: CPTII,95,, | Performed by: NURSE PRACTITIONER

## 2022-12-21 PROCEDURE — 1160F PR REVIEW ALL MEDS BY PRESCRIBER/CLIN PHARMACIST DOCUMENTED: ICD-10-PCS | Mod: CPTII,95,, | Performed by: NURSE PRACTITIONER

## 2022-12-21 PROCEDURE — 99213 PR OFFICE/OUTPT VISIT, EST, LEVL III, 20-29 MIN: ICD-10-PCS | Mod: 95,,, | Performed by: NURSE PRACTITIONER

## 2022-12-21 PROCEDURE — 1160F RVW MEDS BY RX/DR IN RCRD: CPT | Mod: CPTII,95,, | Performed by: NURSE PRACTITIONER

## 2022-12-21 RX ORDER — PREDNISONE 20 MG/1
20 TABLET ORAL DAILY
Qty: 5 TABLET | Refills: 0 | Status: SHIPPED | OUTPATIENT
Start: 2022-12-21 | End: 2023-03-07

## 2022-12-21 RX ORDER — AZITHROMYCIN 250 MG/1
TABLET, FILM COATED ORAL
Qty: 6 TABLET | Refills: 0 | Status: SHIPPED | OUTPATIENT
Start: 2022-12-21 | End: 2022-12-26

## 2022-12-21 RX ORDER — PROMETHAZINE HYDROCHLORIDE AND DEXTROMETHORPHAN HYDROBROMIDE 6.25; 15 MG/5ML; MG/5ML
5 SYRUP ORAL EVERY 4 HOURS PRN
Qty: 118 ML | Refills: 0 | Status: SHIPPED | OUTPATIENT
Start: 2022-12-21 | End: 2023-08-10 | Stop reason: SDUPTHER

## 2022-12-21 RX ORDER — CETIRIZINE HYDROCHLORIDE 10 MG/1
10 TABLET ORAL DAILY
Qty: 30 TABLET | Refills: 11 | Status: SHIPPED | OUTPATIENT
Start: 2022-12-21 | End: 2023-03-07

## 2022-12-21 NOTE — PROGRESS NOTES
[facility] Note    Chief Complaint      Chief Complaint   Patient presents with    Cough    Sinus Problem    Sinusitis    postnasal drip    Rhinitis       History of Present Illness      Carlos Morgan is a 72 y.o. male who presents today via virtual visit for   Chief Complaint   Patient presents with    Cough    Sinus Problem    Sinusitis    postnasal drip    Rhinitis         Cough  This is a new problem. The current episode started in the past 7 days. The problem has been gradually worsening. The problem occurs every few hours. The cough is Productive of sputum. Associated symptoms include nasal congestion, postnasal drip and rhinorrhea. Pertinent negatives include no ear congestion, headaches, hemoptysis, shortness of breath or wheezing. The symptoms are aggravated by lying down. He has tried OTC cough suppressant for the symptoms. The treatment provided no relief. His past medical history is significant for environmental allergies. There is no history of asthma, bronchiectasis, bronchitis, COPD, emphysema or pneumonia.    He has tried treating with Dayquil and Nyquil with no resolution of symptoms.    Review of Systems   Constitutional: Negative.    HENT:  Positive for congestion, postnasal drip, rhinorrhea and sinus pain.    Eyes: Negative.    Respiratory:  Positive for cough. Negative for hemoptysis, shortness of breath and wheezing.    Cardiovascular: Negative.    Gastrointestinal: Negative.    Genitourinary: Negative.    Musculoskeletal: Negative.    Skin: Negative.    Neurological: Negative.  Negative for headaches.   Endo/Heme/Allergies:  Positive for environmental allergies.   Psychiatric/Behavioral: Negative.        Family History:  family history includes Cancer in his father; Depression in his mother; Diabetes in his mother; Heart disease in his maternal grandfather; Hypertension in his brother and mother.   Family history was reviewed with patient.     Medications:  Outpatient  Encounter Medications as of 12/21/2022   Medication Sig Dispense Refill    amLODIPine (NORVASC) 10 MG tablet TAKE 1 TABLET AT BEDTIME 90 tablet 3    aspirin (ECOTRIN) 81 MG EC tablet Take 1 tablet (81 mg total) by mouth once daily. 90 tablet 3    azithromycin (Z-KRISTAN) 250 MG tablet Take 2 tablets (500 mg total) by mouth once daily for 1 day, THEN 1 tablet (250 mg total) once daily for 4 days. 6 tablet 0    cetirizine (ZYRTEC) 10 MG tablet Take 1 tablet (10 mg total) by mouth once daily. 30 tablet 11    co-enzyme Q-10 30 mg capsule Take 500 mg by mouth once daily.       dutasteride (AVODART) 0.5 mg capsule Take 1 capsule (0.5 mg total) by mouth once daily. 90 capsule 3    flaxseed oil 1,000 mg Cap Take by mouth.      hydroCHLOROthiazide (MICROZIDE) 12.5 mg capsule TAKE 1 CAPSULE BEFORE      BREAKFAST 90 capsule 3    olmesartan (BENICAR) 40 MG tablet TAKE 1 TABLET ONCE DAILY 90 tablet 1    predniSONE (DELTASONE) 20 MG tablet Take 1 tablet (20 mg total) by mouth once daily. 5 tablet 0    promethazine-dextromethorphan (PROMETHAZINE-DM) 6.25-15 mg/5 mL Syrp Take 5 mLs by mouth every 4 (four) hours as needed (cough). 118 mL 0    rosuvastatin (CRESTOR) 20 MG tablet Take 1 tablet (20 mg total) by mouth once daily. 90 tablet 3    vitamin D (VITAMIN D3) 1000 units Tab Take 185 mg by mouth once daily.        No facility-administered encounter medications on file as of 12/21/2022.       Allergies:  Review of patient's allergies indicates:   Allergen Reactions    Lisinopril Other (See Comments)     Hearing loss       Health Maintenance:  Health Maintenance   Topic Date Due    Aspirin/Antiplatelet Therapy  02/22/2020    High Dose Statin  10/20/2023    Lipid Panel  01/13/2027    TETANUS VACCINE  05/02/2032    Hepatitis C Screening  Completed    Abdominal Aortic Aneurysm Screening  Completed     Health Maintenance Topics with due status: Not Due       Topic Last Completion Date    Lipid Panel 01/13/2022    Colorectal Cancer  Screening 01/25/2022    TETANUS VACCINE 05/02/2022    High Dose Statin 10/20/2022       Physical Exam     Assessment/Plan     Carlos Morgan is a 72 y.o.male with:    Sinus congestion  -     predniSONE (DELTASONE) 20 MG tablet; Take 1 tablet (20 mg total) by mouth once daily.  Dispense: 5 tablet; Refill: 0    Sinusitis, unspecified chronicity, unspecified location  -     azithromycin (Z-KRISTAN) 250 MG tablet; Take 2 tablets (500 mg total) by mouth once daily for 1 day, THEN 1 tablet (250 mg total) once daily for 4 days.  Dispense: 6 tablet; Refill: 0    Cough, unspecified type  -     promethazine-dextromethorphan (PROMETHAZINE-DM) 6.25-15 mg/5 mL Syrp; Take 5 mLs by mouth every 4 (four) hours as needed (cough).  Dispense: 118 mL; Refill: 0    Seasonal allergies  -     cetirizine (ZYRTEC) 10 MG tablet; Take 1 tablet (10 mg total) by mouth once daily.  Dispense: 30 tablet; Refill: 11        As above, continue current medications and maintain follow up with specialists.  Return to clinic as needed.    I spent 18 minutes on the day of this virtual encounter for preparing, evaluating, treating, and discussing plan of care with this patient.  Greater than 50% of this time was spent face to face via virtual visit with patient.  All questions were answered to patient's satisfaction.          Karen L Spencer, NP-C [facility]

## 2023-02-01 DIAGNOSIS — I10 ESSENTIAL HYPERTENSION: ICD-10-CM

## 2023-03-07 ENCOUNTER — DOCUMENTATION ONLY (OUTPATIENT)
Dept: PRIMARY CARE CLINIC | Facility: CLINIC | Age: 73
End: 2023-03-07
Payer: MEDICARE

## 2023-03-07 ENCOUNTER — OFFICE VISIT (OUTPATIENT)
Dept: PRIMARY CARE CLINIC | Facility: CLINIC | Age: 73
End: 2023-03-07
Payer: MEDICARE

## 2023-03-07 VITALS
WEIGHT: 233.25 LBS | HEART RATE: 84 BPM | OXYGEN SATURATION: 96 % | DIASTOLIC BLOOD PRESSURE: 70 MMHG | HEIGHT: 72 IN | BODY MASS INDEX: 31.59 KG/M2 | SYSTOLIC BLOOD PRESSURE: 134 MMHG

## 2023-03-07 DIAGNOSIS — I25.10 CORONARY ARTERY DISEASE INVOLVING NATIVE CORONARY ARTERY OF NATIVE HEART WITHOUT ANGINA PECTORIS: Primary | Chronic | ICD-10-CM

## 2023-03-07 DIAGNOSIS — I11.9 HYPERTENSIVE HEART DISEASE WITHOUT HEART FAILURE: ICD-10-CM

## 2023-03-07 DIAGNOSIS — G47.33 OBSTRUCTIVE SLEEP APNEA SYNDROME: ICD-10-CM

## 2023-03-07 DIAGNOSIS — M35.3 POLYMYALGIA RHEUMATICA: ICD-10-CM

## 2023-03-07 DIAGNOSIS — R39.12 BENIGN PROSTATIC HYPERPLASIA WITH WEAK URINARY STREAM: ICD-10-CM

## 2023-03-07 DIAGNOSIS — E66.01 MORBID OBESITY: ICD-10-CM

## 2023-03-07 DIAGNOSIS — N28.1 CYST OF LEFT KIDNEY: ICD-10-CM

## 2023-03-07 DIAGNOSIS — E78.2 MIXED HYPERLIPIDEMIA: Chronic | ICD-10-CM

## 2023-03-07 DIAGNOSIS — Z12.5 SCREENING FOR MALIGNANT NEOPLASM OF PROSTATE: ICD-10-CM

## 2023-03-07 DIAGNOSIS — Z12.11 SCREEN FOR COLON CANCER: ICD-10-CM

## 2023-03-07 DIAGNOSIS — N40.1 BENIGN PROSTATIC HYPERPLASIA WITH WEAK URINARY STREAM: ICD-10-CM

## 2023-03-07 PROCEDURE — 99999 PR PBB SHADOW E&M-EST. PATIENT-LVL IV: ICD-10-PCS | Mod: PBBFAC,,, | Performed by: INTERNAL MEDICINE

## 2023-03-07 PROCEDURE — 3008F BODY MASS INDEX DOCD: CPT | Mod: CPTII,S$GLB,, | Performed by: INTERNAL MEDICINE

## 2023-03-07 PROCEDURE — 1160F RVW MEDS BY RX/DR IN RCRD: CPT | Mod: CPTII,S$GLB,, | Performed by: INTERNAL MEDICINE

## 2023-03-07 PROCEDURE — 3078F DIAST BP <80 MM HG: CPT | Mod: CPTII,S$GLB,, | Performed by: INTERNAL MEDICINE

## 2023-03-07 PROCEDURE — 4010F PR ACE/ARB THEARPY RXD/TAKEN: ICD-10-PCS | Mod: CPTII,S$GLB,, | Performed by: INTERNAL MEDICINE

## 2023-03-07 PROCEDURE — 3075F SYST BP GE 130 - 139MM HG: CPT | Mod: CPTII,S$GLB,, | Performed by: INTERNAL MEDICINE

## 2023-03-07 PROCEDURE — 3078F PR MOST RECENT DIASTOLIC BLOOD PRESSURE < 80 MM HG: ICD-10-PCS | Mod: CPTII,S$GLB,, | Performed by: INTERNAL MEDICINE

## 2023-03-07 PROCEDURE — 1159F MED LIST DOCD IN RCRD: CPT | Mod: CPTII,S$GLB,, | Performed by: INTERNAL MEDICINE

## 2023-03-07 PROCEDURE — 1126F AMNT PAIN NOTED NONE PRSNT: CPT | Mod: CPTII,S$GLB,, | Performed by: INTERNAL MEDICINE

## 2023-03-07 PROCEDURE — 4010F ACE/ARB THERAPY RXD/TAKEN: CPT | Mod: CPTII,S$GLB,, | Performed by: INTERNAL MEDICINE

## 2023-03-07 PROCEDURE — 1126F PR PAIN SEVERITY QUANTIFIED, NO PAIN PRESENT: ICD-10-PCS | Mod: CPTII,S$GLB,, | Performed by: INTERNAL MEDICINE

## 2023-03-07 PROCEDURE — 1159F PR MEDICATION LIST DOCUMENTED IN MEDICAL RECORD: ICD-10-PCS | Mod: CPTII,S$GLB,, | Performed by: INTERNAL MEDICINE

## 2023-03-07 PROCEDURE — 99999 PR PBB SHADOW E&M-EST. PATIENT-LVL IV: CPT | Mod: PBBFAC,,, | Performed by: INTERNAL MEDICINE

## 2023-03-07 PROCEDURE — 3075F PR MOST RECENT SYSTOLIC BLOOD PRESS GE 130-139MM HG: ICD-10-PCS | Mod: CPTII,S$GLB,, | Performed by: INTERNAL MEDICINE

## 2023-03-07 PROCEDURE — 99214 OFFICE O/P EST MOD 30 MIN: CPT | Mod: S$GLB,,, | Performed by: INTERNAL MEDICINE

## 2023-03-07 PROCEDURE — 1160F PR REVIEW ALL MEDS BY PRESCRIBER/CLIN PHARMACIST DOCUMENTED: ICD-10-PCS | Mod: CPTII,S$GLB,, | Performed by: INTERNAL MEDICINE

## 2023-03-07 PROCEDURE — 3008F PR BODY MASS INDEX (BMI) DOCUMENTED: ICD-10-PCS | Mod: CPTII,S$GLB,, | Performed by: INTERNAL MEDICINE

## 2023-03-07 PROCEDURE — 99214 PR OFFICE/OUTPT VISIT, EST, LEVL IV, 30-39 MIN: ICD-10-PCS | Mod: S$GLB,,, | Performed by: INTERNAL MEDICINE

## 2023-03-07 RX ORDER — ROSUVASTATIN CALCIUM 10 MG/1
10 TABLET, COATED ORAL DAILY
Qty: 90 TABLET | Refills: 3 | Status: SHIPPED | OUTPATIENT
Start: 2023-03-07 | End: 2024-03-06

## 2023-03-07 NOTE — PROGRESS NOTES
[facility] Note    Chief Complaint      Chief Complaint   Patient presents with    Annual Exam       History of Present Illness      Carlos Morgan is a 72 y.o. male with chronic conditions of CAD, HTN, HLD, BPH, CAROLINE who presents today for: follow up chronic conditions.  CAD: Sees Dr. Hernandez.  Had nuclear stress test this year which was normal. S/P CABG 2007.  On ASA, olmesartan, crestor.    HTN: BP at goal on amlodipine, olmesartan, hydrochlorothiazide.     HLD: Controlled on crestor.  LDL 41 last check, due for repeat.    BPH, gynecomastia: Controlled on avodart.  Sees Dr. Gonzalez.  Off arimidex, previously on to prevent elevated estrogen and worsening breast tissue development.  PMR: Sees Dr. Obrien, PMR.  Started with symptoms of hand pains that disseminated in 2019.  Worsened early 2020.  Had significant improvement with prednisone Summer 2020.  Off prednisone.  Still with soreness after 30 minutes or more of immobility.    CAROLINE: Compliant with CPAP which improves sleep quality and energy levels.  Last CAROLINE evaluation 2005.    Vit D deficiency: Controlled on vit d supplement.  Flu shot UTD.  Prevnar UTD.  Pneumovax UTD.  Zostavax 2014.  COVID vaccine UTD w/ booster.  Shingrix discussed.  Cscope previously 2007.  Previous hx of polyps.  FOBT negative 2021, due for repeat.      Past Medical History:  Past Medical History:   Diagnosis Date    Disorder of kidney and ureter     Plantar fasciitis     Sleep apnea 2005    cpap, 2005    Urinary tract infection        Past Surgical History:   has a past surgical history that includes Coronary artery bypass graft (2007); Tonsillectomy (1955); Cataract extraction, bilateral (Bilateral, 2021); Hand surgery (Right, 2021); and Eye surgery (2 years ago).    Family History:  family history includes Cancer in his father; Depression in his mother; Diabetes in his mother; Heart disease in his maternal grandfather; Hypertension in his brother and mother.      Social History:  Social History     Tobacco Use    Smoking status: Former     Packs/day: 0.50     Years: 10.00     Pack years: 5.00     Types: Cigarettes     Start date: 1967     Quit date: 1983     Years since quittin.2    Smokeless tobacco: Never   Substance Use Topics    Alcohol use: Yes     Alcohol/week: 1.0 standard drink     Types: 1 Glasses of wine per week     Comment: socially    Drug use: Never       I personally reviewed all past medical, surgical, social and family history.    Review of Systems   Constitutional:  Negative for chills, fever and malaise/fatigue.   Respiratory:  Negative for shortness of breath.    Cardiovascular:  Negative for chest pain.   Gastrointestinal:  Negative for constipation, diarrhea, nausea and vomiting.   Skin:  Negative for rash.   Neurological:  Negative for weakness.   All other systems reviewed and are negative.     Medications:  Outpatient Encounter Medications as of 3/7/2023   Medication Sig Dispense Refill    aspirin (ECOTRIN) 81 MG EC tablet Take 1 tablet (81 mg total) by mouth once daily. 90 tablet 3    amLODIPine (NORVASC) 10 MG tablet TAKE 1 TABLET AT BEDTIME 90 tablet 3    co-enzyme Q-10 30 mg capsule Take 500 mg by mouth once daily.       dutasteride (AVODART) 0.5 mg capsule TAKE 1 CAPSULE BY MOUTH ONCE DAILY 90 capsule 3    hydroCHLOROthiazide (MICROZIDE) 12.5 mg capsule TAKE 1 CAPSULE BEFORE      BREAKFAST 90 capsule 3    olmesartan (BENICAR) 40 MG tablet TAKE 1 TABLET ONCE DAILY 90 tablet 0    rosuvastatin (CRESTOR) 10 MG tablet Take 1 tablet (10 mg total) by mouth once daily. 90 tablet 3    TURMERIC ORAL Take by mouth.      vitamin D (VITAMIN D3) 1000 units Tab Take 185 mg by mouth once daily.       [DISCONTINUED] amLODIPine (NORVASC) 10 MG tablet TAKE 1 TABLET AT BEDTIME 90 tablet 3    [DISCONTINUED] cetirizine (ZYRTEC) 10 MG tablet Take 1 tablet (10 mg total) by mouth once daily. 30 tablet 11    [DISCONTINUED] flaxseed oil 1,000 mg Cap Take  by mouth.      [DISCONTINUED] hydroCHLOROthiazide (MICROZIDE) 12.5 mg capsule TAKE 1 CAPSULE BEFORE      BREAKFAST 90 capsule 3    [DISCONTINUED] olmesartan (BENICAR) 40 MG tablet TAKE 1 TABLET ONCE DAILY 90 tablet 1    [DISCONTINUED] predniSONE (DELTASONE) 20 MG tablet Take 1 tablet (20 mg total) by mouth once daily. 5 tablet 0    [DISCONTINUED] rosuvastatin (CRESTOR) 20 MG tablet Take 1 tablet (20 mg total) by mouth once daily. (Patient taking differently: Take 10 mg by mouth once daily.) 90 tablet 3     No facility-administered encounter medications on file as of 3/7/2023.       Allergies:  Review of patient's allergies indicates:   Allergen Reactions    Lisinopril Other (See Comments)     Hearing loss       Health Maintenance:  Immunization History   Administered Date(s) Administered    COVID-19, MRNA, LN-S, PF (MODERNA FULL 0.5 ML DOSE) 01/28/2021, 02/25/2021, 11/21/2021    Influenza (FLUAD) - Quadrivalent - Adjuvanted - PF *Preferred* (65+) 09/18/2020, 11/05/2021    Influenza (FLUAD) - Trivalent - Adjuvanted - PF (65+) 10/08/2019    Influenza - High Dose - PF (65 years and older) 10/31/2014, 10/06/2015, 11/03/2016, 10/09/2017, 10/04/2018    Influenza - Quadrivalent - PF *Preferred* (6 months and older) 12/03/2021    Pneumococcal Conjugate - 13 Valent 11/05/2018    Pneumococcal Polysaccharide - 23 Valent 11/18/2014    Tdap 05/02/2022    Zoster 11/18/2014      Health Maintenance   Topic Date Due    High Dose Statin  Never done    Aspirin/Antiplatelet Therapy  10/20/2023    Lipid Panel  01/13/2027    TETANUS VACCINE  05/02/2032    Hepatitis C Screening  Completed    Abdominal Aortic Aneurysm Screening  Completed        Physical Exam      Vital Signs  Pulse: 84  SpO2: 96 %  BP: 134/70  BP Location: Right arm  Patient Position: Sitting  Pain Score: 0-No pain  Height and Weight  Height: 6' (182.9 cm)  Weight: 105.8 kg (233 lb 4 oz)  BSA (Calculated - sq m): 2.32 sq meters  BMI (Calculated): 31.6  Weight in (lb) to  have BMI = 25: 183.9]    Physical Exam  Vitals reviewed.   Constitutional:       Appearance: He is well-developed.   HENT:      Head: Normocephalic and atraumatic.      Right Ear: External ear normal.      Left Ear: External ear normal.   Cardiovascular:      Rate and Rhythm: Normal rate and regular rhythm.      Heart sounds: Normal heart sounds. No murmur heard.  Pulmonary:      Effort: Pulmonary effort is normal.      Breath sounds: Normal breath sounds. No wheezing or rales.   Abdominal:      General: Bowel sounds are normal.      Palpations: Abdomen is soft.        Laboratory:  CBC:  Recent Labs   Lab 06/22/20  1418 07/23/20  0831 01/13/22  0801   WBC 8.50 7.44 5.88   RBC 4.35 L 4.27 L 4.61   Hemoglobin 12.6 L 12.6 L 14.0   Hematocrit 39.6 L 39.1 L 42.2   Platelets 334 278 215   MCV 91 92 92   MCH 29.0 29.5 30.4   MCHC 31.8 L 32.2 33.2     CMP:  Recent Labs   Lab 06/24/20  0754 06/24/20  0754 07/23/20  0831 01/13/22  0801   Glucose  --    < > 96 95   Calcium  --    < > 9.5 9.7   Albumin 4.5  --  4.2 4.6   Total Protein 7.6  --  6.7 7.3   Sodium  --    < > 143 144   Potassium  --    < > 3.7 4.2   CO2  --    < > 27 26   Chloride  --    < > 106 104   BUN  --    < > 25 H 20   Alkaline Phosphatase 72  --  58 61   ALT 27  --  21 19   AST 28  --  22 35   Total Bilirubin 0.6  --  0.8 0.9    < > = values in this interval not displayed.     URINALYSIS:  Recent Labs   Lab 10/14/21  1051   Color, UA Yellow   Specific Gravity, UA 1.025   pH, UA 8.0   Protein, UA Negative   Nitrite, UA Negative   Leukocytes, UA Negative   Urobilinogen, UA Negative      LIPIDS:  Recent Labs   Lab 07/23/20  0831 01/13/22  0801   HDL 51 39 L   Cholesterol 111 L 102 L   Triglycerides 93 60   LDL Cholesterol 41.4 L 51.0 L   HDL/Cholesterol Ratio 45.9 38.2   Non-HDL Cholesterol 60 63   Total Cholesterol/HDL Ratio 2.2 2.6     TSH:      A1C:        Assessment/Plan     Carlos Morgan is a 72 y.o.male with:    1. Coronary artery disease involving  native coronary artery of native heart without angina pectoris  - CBC Auto Differential; Future  Continue current meds.    2. Hypertensive heart disease without heart failure  - Comprehensive Metabolic Panel; Future  Continue current meds.    3. Mixed hyperlipidemia  - rosuvastatin (CRESTOR) 10 MG tablet; Take 1 tablet (10 mg total) by mouth once daily.  Dispense: 90 tablet; Refill: 3  - Comprehensive Metabolic Panel; Future  - Lipid Panel; Future  Continue current meds.    4. Benign prostatic hyperplasia with weak urinary stream  Continue current meds.    5. Obstructive sleep apnea syndrome  Cont CPAP  6. Morbid obesity  Working on weight loss  7. Polymyalgia rheumatica  Doing well with no recent recurrence.  8. Cyst of left kidney  Update ultrasound  9. Screening for malignant neoplasm of prostate  - PSA, Screening; Future    10. Screen for colon cancer  - Fecal Immunochemical Test (iFOBT); Future       Chronic conditions status updated as per HPI.  Other than changes above, cont current medications and maintain follow up with specialists.  No follow-ups on file.    No future appointments.    Saad Bella MD  [facility]

## 2023-03-08 ENCOUNTER — LAB VISIT (OUTPATIENT)
Dept: LAB | Facility: HOSPITAL | Age: 73
End: 2023-03-08
Attending: INTERNAL MEDICINE
Payer: MEDICARE

## 2023-03-08 DIAGNOSIS — Z12.5 SCREENING FOR MALIGNANT NEOPLASM OF PROSTATE: ICD-10-CM

## 2023-03-08 DIAGNOSIS — I11.9 HYPERTENSIVE HEART DISEASE WITHOUT HEART FAILURE: ICD-10-CM

## 2023-03-08 DIAGNOSIS — E78.2 MIXED HYPERLIPIDEMIA: Chronic | ICD-10-CM

## 2023-03-08 DIAGNOSIS — I25.10 CORONARY ARTERY DISEASE INVOLVING NATIVE CORONARY ARTERY OF NATIVE HEART WITHOUT ANGINA PECTORIS: Chronic | ICD-10-CM

## 2023-03-08 LAB
ALBUMIN SERPL BCP-MCNC: 4.4 G/DL (ref 3.5–5.2)
ALP SERPL-CCNC: 69 U/L (ref 55–135)
ALT SERPL W/O P-5'-P-CCNC: 21 U/L (ref 10–44)
ANION GAP SERPL CALC-SCNC: 8 MMOL/L (ref 8–16)
AST SERPL-CCNC: 24 U/L (ref 10–40)
BASOPHILS # BLD AUTO: 0.07 K/UL (ref 0–0.2)
BASOPHILS NFR BLD: 1.3 % (ref 0–1.9)
BILIRUB SERPL-MCNC: 1.8 MG/DL (ref 0.1–1)
BUN SERPL-MCNC: 18 MG/DL (ref 8–23)
CALCIUM SERPL-MCNC: 9.7 MG/DL (ref 8.7–10.5)
CHLORIDE SERPL-SCNC: 104 MMOL/L (ref 95–110)
CHOLEST SERPL-MCNC: 114 MG/DL (ref 120–199)
CHOLEST/HDLC SERPL: 2.7 {RATIO} (ref 2–5)
CO2 SERPL-SCNC: 27 MMOL/L (ref 23–29)
COMPLEXED PSA SERPL-MCNC: 0.49 NG/ML (ref 0–4)
CREAT SERPL-MCNC: 0.8 MG/DL (ref 0.5–1.4)
DIFFERENTIAL METHOD: NORMAL
EOSINOPHIL # BLD AUTO: 0.2 K/UL (ref 0–0.5)
EOSINOPHIL NFR BLD: 3.1 % (ref 0–8)
ERYTHROCYTE [DISTWIDTH] IN BLOOD BY AUTOMATED COUNT: 12.2 % (ref 11.5–14.5)
EST. GFR  (NO RACE VARIABLE): >60 ML/MIN/1.73 M^2
GLUCOSE SERPL-MCNC: 98 MG/DL (ref 70–110)
HCT VFR BLD AUTO: 45 % (ref 40–54)
HDLC SERPL-MCNC: 42 MG/DL (ref 40–75)
HDLC SERPL: 36.8 % (ref 20–50)
HGB BLD-MCNC: 14.4 G/DL (ref 14–18)
IMM GRANULOCYTES # BLD AUTO: 0.01 K/UL (ref 0–0.04)
IMM GRANULOCYTES NFR BLD AUTO: 0.2 % (ref 0–0.5)
LDLC SERPL CALC-MCNC: 55.2 MG/DL (ref 63–159)
LYMPHOCYTES # BLD AUTO: 1.5 K/UL (ref 1–4.8)
LYMPHOCYTES NFR BLD: 27.8 % (ref 18–48)
MCH RBC QN AUTO: 30.5 PG (ref 27–31)
MCHC RBC AUTO-ENTMCNC: 32 G/DL (ref 32–36)
MCV RBC AUTO: 95 FL (ref 82–98)
MONOCYTES # BLD AUTO: 0.5 K/UL (ref 0.3–1)
MONOCYTES NFR BLD: 9.4 % (ref 4–15)
NEUTROPHILS # BLD AUTO: 3.2 K/UL (ref 1.8–7.7)
NEUTROPHILS NFR BLD: 58.2 % (ref 38–73)
NONHDLC SERPL-MCNC: 72 MG/DL
NRBC BLD-RTO: 0 /100 WBC
PLATELET # BLD AUTO: 241 K/UL (ref 150–450)
PMV BLD AUTO: 11.3 FL (ref 9.2–12.9)
POTASSIUM SERPL-SCNC: 4.2 MMOL/L (ref 3.5–5.1)
PROT SERPL-MCNC: 7.3 G/DL (ref 6–8.4)
RBC # BLD AUTO: 4.72 M/UL (ref 4.6–6.2)
SODIUM SERPL-SCNC: 139 MMOL/L (ref 136–145)
TRIGL SERPL-MCNC: 84 MG/DL (ref 30–150)
WBC # BLD AUTO: 5.44 K/UL (ref 3.9–12.7)

## 2023-03-08 PROCEDURE — 36415 COLL VENOUS BLD VENIPUNCTURE: CPT | Performed by: INTERNAL MEDICINE

## 2023-03-08 PROCEDURE — 85025 COMPLETE CBC W/AUTO DIFF WBC: CPT | Performed by: INTERNAL MEDICINE

## 2023-03-08 PROCEDURE — 84153 ASSAY OF PSA TOTAL: CPT | Performed by: INTERNAL MEDICINE

## 2023-03-08 PROCEDURE — 80053 COMPREHEN METABOLIC PANEL: CPT | Performed by: INTERNAL MEDICINE

## 2023-03-08 PROCEDURE — 80061 LIPID PANEL: CPT | Performed by: INTERNAL MEDICINE

## 2023-03-10 ENCOUNTER — LAB VISIT (OUTPATIENT)
Dept: LAB | Facility: HOSPITAL | Age: 73
End: 2023-03-10
Attending: INTERNAL MEDICINE
Payer: MEDICARE

## 2023-03-10 ENCOUNTER — PATIENT MESSAGE (OUTPATIENT)
Dept: PRIMARY CARE CLINIC | Facility: CLINIC | Age: 73
End: 2023-03-10
Payer: MEDICARE

## 2023-03-10 DIAGNOSIS — Z12.11 SCREEN FOR COLON CANCER: ICD-10-CM

## 2023-03-10 DIAGNOSIS — E80.6 HYPERBILIRUBINEMIA: Primary | ICD-10-CM

## 2023-03-10 PROCEDURE — 82274 ASSAY TEST FOR BLOOD FECAL: CPT | Performed by: INTERNAL MEDICINE

## 2023-03-14 LAB — HEMOCCULT STL QL IA: NEGATIVE

## 2023-03-15 ENCOUNTER — PATIENT MESSAGE (OUTPATIENT)
Dept: PRIMARY CARE CLINIC | Facility: CLINIC | Age: 73
End: 2023-03-15
Payer: MEDICARE

## 2023-03-15 DIAGNOSIS — N28.1 CYST OF LEFT KIDNEY: Primary | ICD-10-CM

## 2023-03-15 PROCEDURE — 99452 NTRPROF PH1/NTRNET/EHR RFRL: CPT | Mod: S$GLB,,, | Performed by: INTERNAL MEDICINE

## 2023-03-15 PROCEDURE — 99452 E-CONSULT TO UROLOGY: ICD-10-PCS | Mod: S$GLB,,, | Performed by: INTERNAL MEDICINE

## 2023-03-15 NOTE — TELEPHONE ENCOUNTER
Called and spoke to Mr Rosario. Apologized for mistake and explained it has been corrected. Pt states he is ok with consulting urology. Please e consult with urology.

## 2023-03-15 NOTE — TELEPHONE ENCOUNTER
Called and spoke to pt. Pt states his ultrasound reports a uterus measurement however pt does not have a uterus. Pt concerned that this may not be his scan. Will reach out to Dr Feliciano to confirm report.

## 2023-03-17 ENCOUNTER — E-CONSULT (OUTPATIENT)
Dept: UROLOGY | Facility: CLINIC | Age: 73
End: 2023-03-17
Payer: MEDICARE

## 2023-03-17 DIAGNOSIS — N28.1 CYST OF LEFT KIDNEY: Primary | ICD-10-CM

## 2023-03-17 PROCEDURE — 99451 PR INTERPROF, PHONE/INTERNET/EHR, CONSULT, >= 5MINS: ICD-10-PCS | Mod: S$GLB,,, | Performed by: STUDENT IN AN ORGANIZED HEALTH CARE EDUCATION/TRAINING PROGRAM

## 2023-03-17 PROCEDURE — 99451 NTRPROF PH1/NTRNET/EHR 5/>: CPT | Mod: S$GLB,,, | Performed by: STUDENT IN AN ORGANIZED HEALTH CARE EDUCATION/TRAINING PROGRAM

## 2023-03-17 NOTE — PROGRESS NOTES
The patient has had an E-Consult completed. Please refer to that note as needed. Please communicate the information below to the patient. Based on the recommendations of the specialist, I recommend that the patient do the following:    No further surveillance or follow up needed for this simple kidney cyst.  Recommendations relayed to patient by phone.

## 2023-03-17 NOTE — CONSULTS
[facility]  Response for E-Consult     Patient Name: Carlos Morgan  MRN: [id number]  Primary Care Provider: Saad Bella MD   Requesting Provider: Saad Bella MD  E-Consult to Urology  Consult performed by: Peng Yeh MD  Consult ordered by: Saad Bella MD  Assessment/Recommendations: Simple cyst - no further work up or imaging required      Findings: simple cyst. There is no solid component to the cyst. While official Bosniak scoring requires a CT with and without contrast an ultrasound can give us an idea of the cyst complexity. This cyst is simple appearing, and is most consistent with a Bosniak 1 type cyst.    Bosniak 1 - simple cyst with: imperceptible wall, round characterization. No further workup is needed as the likelihood of malignancy is ~0%.    I did not speak to the requesting provider verbally about this.     Total time of Consultation: 20 minutes    Percentage of time spent on verbal/written discussion: 25%     *This eConsult is based on the clinical data available to me and is furnished without benefit of a physical examination. The eConsult will need to be interpreted in light of any clinical issues or changes in patient status not available to me at the time of filing this eConsults. Significant changes in patient condition or level of acuity should result in immediate formal consultation and reevaluation. Please alert me if you have further questions.    Thank you for your consult.     Peng Yeh MD  [facility]

## 2023-06-07 ENCOUNTER — PATIENT MESSAGE (OUTPATIENT)
Dept: SPORTS MEDICINE | Facility: CLINIC | Age: 73
End: 2023-06-07
Payer: MEDICARE

## 2023-07-19 DIAGNOSIS — I10 ESSENTIAL HYPERTENSION: ICD-10-CM

## 2023-07-19 RX ORDER — OLMESARTAN MEDOXOMIL 40 MG/1
TABLET ORAL
Qty: 90 TABLET | Refills: 2 | Status: SHIPPED | OUTPATIENT
Start: 2023-07-19

## 2023-07-19 NOTE — TELEPHONE ENCOUNTER
No care due was identified.  [facility] Embedded Care Due Messages. Reference number: [id number].   7/19/2023 11:06:17 AM CDT

## 2023-07-19 NOTE — TELEPHONE ENCOUNTER
Refill Decision Note   Carlos Morgan  is requesting a refill authorization.  Brief Assessment and Rationale for Refill:  Approve     Medication Therapy Plan:         Comments:     Note composed:11:48 AM 07/19/2023             Appointments     Last Visit   3/7/2023 Saad Bella MD   Next Visit   Visit date not found Saad Bella MD

## 2023-08-07 ENCOUNTER — PATIENT MESSAGE (OUTPATIENT)
Dept: PRIMARY CARE CLINIC | Facility: CLINIC | Age: 73
End: 2023-08-07
Payer: MEDICARE

## 2023-08-07 DIAGNOSIS — U07.1 COVID-19: Primary | ICD-10-CM

## 2023-08-10 ENCOUNTER — PATIENT MESSAGE (OUTPATIENT)
Dept: PRIMARY CARE CLINIC | Facility: CLINIC | Age: 73
End: 2023-08-10
Payer: MEDICARE

## 2023-08-10 DIAGNOSIS — R05.9 COUGH, UNSPECIFIED TYPE: ICD-10-CM

## 2023-08-11 RX ORDER — PROMETHAZINE HYDROCHLORIDE AND DEXTROMETHORPHAN HYDROBROMIDE 6.25; 15 MG/5ML; MG/5ML
5 SYRUP ORAL EVERY 4 HOURS PRN
Qty: 118 ML | Refills: 0 | Status: SHIPPED | OUTPATIENT
Start: 2023-08-11 | End: 2023-08-21

## 2023-09-21 ENCOUNTER — OFFICE VISIT (OUTPATIENT)
Dept: SLEEP MEDICINE | Facility: CLINIC | Age: 73
End: 2023-09-21
Payer: MEDICARE

## 2023-09-21 DIAGNOSIS — G47.33 OSA (OBSTRUCTIVE SLEEP APNEA): Primary | ICD-10-CM

## 2023-09-21 PROCEDURE — 1160F PR REVIEW ALL MEDS BY PRESCRIBER/CLIN PHARMACIST DOCUMENTED: ICD-10-PCS | Mod: CPTII,95,, | Performed by: PSYCHIATRY & NEUROLOGY

## 2023-09-21 PROCEDURE — 1160F RVW MEDS BY RX/DR IN RCRD: CPT | Mod: CPTII,95,, | Performed by: PSYCHIATRY & NEUROLOGY

## 2023-09-21 PROCEDURE — 4010F PR ACE/ARB THEARPY RXD/TAKEN: ICD-10-PCS | Mod: CPTII,95,, | Performed by: PSYCHIATRY & NEUROLOGY

## 2023-09-21 PROCEDURE — 4010F ACE/ARB THERAPY RXD/TAKEN: CPT | Mod: CPTII,95,, | Performed by: PSYCHIATRY & NEUROLOGY

## 2023-09-21 PROCEDURE — 99214 OFFICE O/P EST MOD 30 MIN: CPT | Mod: 95,,, | Performed by: PSYCHIATRY & NEUROLOGY

## 2023-09-21 PROCEDURE — 1159F PR MEDICATION LIST DOCUMENTED IN MEDICAL RECORD: ICD-10-PCS | Mod: CPTII,95,, | Performed by: PSYCHIATRY & NEUROLOGY

## 2023-09-21 PROCEDURE — 1159F MED LIST DOCD IN RCRD: CPT | Mod: CPTII,95,, | Performed by: PSYCHIATRY & NEUROLOGY

## 2023-09-21 PROCEDURE — 99214 PR OFFICE/OUTPT VISIT, EST, LEVL IV, 30-39 MIN: ICD-10-PCS | Mod: 95,,, | Performed by: PSYCHIATRY & NEUROLOGY

## 2023-09-21 NOTE — PROGRESS NOTES
The patient location is: home  The chief complaint leading to consultation is: sleep disorder  Visit type: audiovisual  Each patient to whom he or she provides medical services by telemedicine is:  (1) informed of the relationship between the physician and patient and the respective role of any other health care provider with respect to management of the patient; and (2) notified that he or she may decline to receive medical services by telemedicine and may withdraw from such care at any time.  31 minutes of total time spent on the encounter, which includes face to face time and non-face to face time preparing to see the patient (eg, review of tests), Obtaining and/or reviewing separately obtained history, Documenting clinical information in the electronic or other health record, Independently interpreting results (not separately reported) and communicating results to the patient/family/caregiver, or Care coordination (not separately reported).               2023     2:42 PM 2022     4:28 PM   EPWORTH SLEEPINESS SCALE TOTAL SCORE    Total score 10 14       Carlos Morgan is a 73 y.o. male seen today for CPAP  follow up. Last seen on Visit date not found.    The patient reports improved sleep continuity and daytime sleepiness on PAP. ESS today is 8.  Reports occasional break through snoring.  No  dry mouth.  No aerophagia or air hunger. Reports occasional mask leaks and discomfort. Using a nasal  mask.  + scar on the bridge of the nose from the dermatologic procedure.     Dreamstation 2 provided by [employer]; original machine through Carlos Castillo  2023 - 2023  Patient ID: [id number]  : 1950  Age: 73 years  Gender: Female  [place]   [facility], [address]  Compliance Report  Compliance  Payor Standard  Usage 2023 - 2023  Usage days 30/30 days (100%)  >= 4 hours 30 days (100%)  < 4 hours 0 days (0%)  Usage hours 247 hours 46  minutes  Average usage (total days) 8 hours 16 minutes  Average usage (days used) 8 hours 16 minutes  Median usage (days used) 8 hours 8 minutes  Total used hours (value since last reset - 09/20/2023) 3,936 hours  AirSense 11 AutoSet  Serial number [id number]  Mode CPAP  Set pressure 9 cmH2O  EPR Fulltime  EPR level 2  Therapy  Leaks - L/min Median: 0.2 95th percentile: 10.8 Maximum: 20.7  Events per hour AI: 1.3 HI: 0.3 AHI: 1.6  Apnea Index Central: 1.0 Obstructive: 0.2 Unknown: 0.0  RERA Index 0.0  Cheyne-Franco respiration (average duration per night) 0 minutes (0%       DME: LINDA got machine in       Sleep studies:   HST 2/2022 AHI 33/Min SpO2: 84.2%       He underwent requal HST 2/2022 revealing ongoing severe CAROLINE and got new cpap machine. Not using humidifier, using nose mask w/o chin strapand finds hose bit more noisier than former one. Sleep more consolidated and doesn't sleep well w/o machine if for instance falls asleep in chair. He uses cpap qhs. Has 2 outdated machines w/o ahi capability he may donate.       Remote 30d avg 8:27h/n AHI 0.6/[place]    EXAM:   There were no vitals taken for this visit.        ASSESSMENT:   CAROLINE, severe. Remains adherent with PAP, benefits from therapy. AHI<5. Meeting medicare guidelines  He has  medical comorbidities of hypertension, CAD hx CABG    PLAN:   1. Will change cpap 9cm to APAP 9-13 cm H2O  Will reorder CPAP supplies  See cards re: CAD mgt/continue ASA and pcp re: HTN mgt/continue meds  rtc 2 yr, sooner if needed

## 2023-10-23 ENCOUNTER — TELEPHONE (OUTPATIENT)
Dept: OTOLARYNGOLOGY | Facility: CLINIC | Age: 73
End: 2023-10-23
Payer: MEDICARE

## 2023-10-23 NOTE — TELEPHONE ENCOUNTER
----- Message from Tammy Vu sent at 10/23/2023  8:30 AM CDT -----  Regarding: Appt  Contact: [phone]  Type:  Needs Medical Advice    Who Called: Carlos  Symptoms (please be specific): Hearing loss   How long has patient had these symptoms:  6 months  Would the patient rather a call back or a response via [facility]? Call  Best Call Back Number: [phone]  Additional Information:

## 2023-11-02 ENCOUNTER — OFFICE VISIT (OUTPATIENT)
Dept: OTOLARYNGOLOGY | Facility: CLINIC | Age: 73
End: 2023-11-02
Payer: MEDICARE

## 2023-11-02 ENCOUNTER — CLINICAL SUPPORT (OUTPATIENT)
Dept: AUDIOLOGY | Facility: CLINIC | Age: 73
End: 2023-11-02
Payer: MEDICARE

## 2023-11-02 DIAGNOSIS — Z86.69 HISTORY OF SUDDEN HEARING LOSS: Primary | ICD-10-CM

## 2023-11-02 DIAGNOSIS — H93.13 TINNITUS, BILATERAL: ICD-10-CM

## 2023-11-02 DIAGNOSIS — H90.3 SENSORINEURAL HEARING LOSS, BILATERAL: Primary | ICD-10-CM

## 2023-11-02 DIAGNOSIS — H90.3 HEARING LOSS, SENSORINEURAL, HIGH FREQUENCY, BILATERAL: ICD-10-CM

## 2023-11-02 DIAGNOSIS — H93.13 BILATERAL NONPULSATILE TINNITUS: ICD-10-CM

## 2023-11-02 PROCEDURE — 4010F PR ACE/ARB THEARPY RXD/TAKEN: ICD-10-PCS | Mod: CPTII,S$GLB,,

## 2023-11-02 PROCEDURE — 92557 PR COMPREHENSIVE HEARING TEST: ICD-10-PCS | Mod: S$GLB,,,

## 2023-11-02 PROCEDURE — 92567 PR TYMPA2METRY: ICD-10-PCS | Mod: S$GLB,,,

## 2023-11-02 PROCEDURE — 92557 COMPREHENSIVE HEARING TEST: CPT | Mod: S$GLB,,,

## 2023-11-02 PROCEDURE — 99214 OFFICE O/P EST MOD 30 MIN: CPT | Mod: S$GLB,,,

## 2023-11-02 PROCEDURE — 4010F ACE/ARB THERAPY RXD/TAKEN: CPT | Mod: CPTII,S$GLB,,

## 2023-11-02 PROCEDURE — 92567 TYMPANOMETRY: CPT | Mod: S$GLB,,,

## 2023-11-02 PROCEDURE — 99214 PR OFFICE/OUTPT VISIT, EST, LEVL IV, 30-39 MIN: ICD-10-PCS | Mod: S$GLB,,,

## 2023-11-02 NOTE — PROGRESS NOTES
Carlos Morgan was seen today in the clinic for an audiologic evaluation. Mr. Morgan reported a history of sudden hearing loss in the right ear that was successfully treated with steroids. Since then, his wife has commented that he has a harder time hearing her. Mr. Morgan reported a constant bilateral tinnitus that has become more noticeable since his last evaluation. Previous audiogram results revealed a mild to moderately-severe sensorineural hearing loss (SNHL) in the right ear and a mild to moderate high-frequency SNHL in the left ear.     Tympanometry revealed Type A in the right ear and Type A in the left ear.     Audiogram results revealed mild hearing loss rising to normal hearing sloping back to mild to moderately severe SNHL in the right ear and mild hearing loss rising to normal hearing sloping back to moderate SNHL in the left ear.      Speech reception thresholds were noted at 20 dB in the right ear and 15 dB in the left ear.    Speech discrimination scores were 96% in the right ear and 92% in the left ear.    Recommendations:  Otologic evaluation  Annual audiogram  Hearing protection when in noise  Hearing aid consultation

## 2023-11-02 NOTE — PROGRESS NOTES
Subjective:   Carlos Morgan is a 73 y.o. male who presents for a hearing evaluation. He reports his wife has been telling him he has been having difficulty hearing her. He reports not hearing difficulty hearing conversation/speech. Report difficulty hearing  at [employer]. Has bilateral tinnitus since last visit with Dr. Patel which is constant and bothersome when its quiet. Denies any otalgia, otorrhea, ear fullness/pressure, or vertigo.  Hx of SSNHL in 2018 that was treated with high dose steroid which gave improvement to his hearing.  There is not a family history of hearing loss at a young age. There is not a prior history of ear surgery. There is not a prior history of ear infections. There is not a history of ear trauma. 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     Past Medical History  He has a past medical history of Disorder of kidney and ureter, Plantar fasciitis, Sleep apnea, and Urinary tract infection.    Past Surgical History  He has a past surgical history that includes Coronary artery bypass graft (2007); Tonsillectomy (1955); Cataract extraction, bilateral (Bilateral, 2021); Hand surgery (Right, 2021); and Eye surgery (2 years ago).    Family History  His family history includes Cancer in his father; Depression in his mother; Diabetes in his mother; Heart disease in his maternal grandfather; Hypertension in his brother and mother.    Social History  He reports that he quit smoking about 40 years ago. His smoking use included cigarettes. He started smoking about 56 years ago. He has a 7.7 pack-year smoking history. He has never used smokeless tobacco. He reports current alcohol use of about 1.0 standard drink of alcohol per week. He reports that he does not use drugs.    Allergies  He is allergic to lisinopril.    Medications  He has a current medication list which includes the following prescription(s):  amlodipine, aspirin, co-enzyme q-10, dutasteride, hydrochlorothiazide, olmesartan, rosuvastatin, turmeric, and vitamin d.  Review of Systems     Constitutional: Positive for fatigue.      HENT: Positive for ringing in the ears.  Negative for ear discharge, ear infection, ear pain, hearing loss, runny nose, sinus infection, sinus pressure, sore throat, stuffy nose, tonsil infection and dental problems.      Eyes:  Negative for change in eyesight, eye drainage, eye itching and photophobia.     Respiratory:  Positive for sleep apnea.      Cardiovascular:  Negative for chest pain, foot swelling, irregular heartbeat and swollen veins.     Gastrointestinal:  Negative for abdominal pain, acid reflux, constipation, diarrhea, heartburn and vomiting.     Genitourinary: Negative for difficulty urinating, sexual problems and frequent urination.     Musc: Positive for aching muscles.     Skin: Positive for rash.     Allergy: Negative for food allergies and seasonal allergies.     Endocrine: Negative for cold intolerance and heat intolerance.      Neurological: Negative for dizziness, headaches, light-headedness, seizures and tremors.      Hematologic: Positive for bruises/bleeds easily.     Psychiatric: Negative for decreased concentration, depression, nervous/anxious and sleep disturbance.          Objective:     Constitutional:   He is oriented to person, place, and time. He appears well-developed and well-nourished. He appears alert. He is cooperative.  Non-toxic appearance. He does not have a sickly appearance. He does not appear ill. Normal speech.      Head:  Normocephalic and atraumatic. Head is without right periorbital erythema, without left periorbital erythema and without TMJ tenderness. Salivary glands normal.  Facial strength is normal.      Ears:    Right Ear: No lacerations. No drainage, swelling or tenderness. No foreign bodies. No mastoid tenderness. Tympanic membrane is not injected, not scarred, not  perforated, not erythematous, not retracted and not bulging. Tympanic membrane mobility is normal. No middle ear effusion. No PE tube. No hemotympanum. Decreased hearing is noted.   Left Ear: No lacerations. No drainage, swelling or tenderness. No foreign bodies. No mastoid tenderness. Tympanic membrane is not injected, not scarred, not perforated, not erythematous, not retracted and not bulging. Tympanic membrane mobility is normal.  No middle ear effusion.  No PE tube. No hemotympanum. Decreased hearing is noted.     Nose:  No mucosal edema, rhinorrhea, sinus tenderness or polyps. No epistaxis. Turbinates normal, no turbinate masses and no turbinate hypertrophy.  Right sinus exhibits no maxillary sinus tenderness and no frontal sinus tenderness. Left sinus exhibits no maxillary sinus tenderness and no frontal sinus tenderness.     Mouth/Throat  Oropharynx clear and moist without lesions or asymmetry and normal uvula midline. Normal dentition. No uvula swelling, oral lesions, trismus or mucous membrane lesions. No oropharyngeal exudate, posterior oropharyngeal edema or posterior oropharyngeal erythema.     Neck:  Trachea normal, phonation normal and no adenopathy. Thyroid tenderness is present. No edema, no erythema and no neck rigidity present. No thyroid mass and no thyromegaly present.     He has no cervical adenopathy.     Pulmonary/Chest:   Effort normal.     Psychiatric:   He has a normal mood and affect. His speech is normal and behavior is normal.     Neurological:   He is alert and oriented to person, place, and time. He has neurological normal, alert and oriented.           Audiogram    I independently reviewed the tracings of the complete audiometric evaluation performed today. I reviewed the audiogram with the patient as well. Pertinent findings include right ear with mild snhl to moderate severe SNHL and left ear with mild SNHL to moderate severe SNHL. Type A tympanogram bilaterally.              Assessment:     1. History of sudden hearing loss    2. Hearing loss, sensorineural, high frequency, bilateral    3. Bilateral nonpulsatile tinnitus      Plan:   Diagnoses and all orders for this visit:    History of sudden hearing loss  2018/Resolved.  Hearing loss, sensorineural, high frequency, bilateral  Audiometric testing interpretation consistent with sensorineural hearing loss.  Discussed the etiology of SNHL. Medically cleared for hearing amplification, and will follow-up with Audiology if interested. Hearing conservation in noisy environments. Return to clinic every year for audiometric testing.   Bilateral nonpulsatile tinnitus  Discussed the etiology of tinnitus and management strategies including the use of background sound enrichment or maskers/ hearing aids. Further instructed that avoidance of caffeine, alcohol, tobacco, better sleep hygiene and stress can be of benefit.    Had discussion about medication which can exacerbate tinnitus. He is currently on Amlodipine . Recently stopped Microzide.   I will attach a list of medication on his mychart which can increase tinnitus.  Recommended a tinnitus management consultation with audiology if interested  RTC as needed or if symptoms persist.  Questions answered.

## 2023-11-28 ENCOUNTER — TELEPHONE (OUTPATIENT)
Dept: CARDIOLOGY | Facility: CLINIC | Age: 73
End: 2023-11-28
Payer: MEDICARE

## 2024-05-01 DIAGNOSIS — I10 ESSENTIAL HYPERTENSION: ICD-10-CM

## 2024-05-13 DIAGNOSIS — I10 ESSENTIAL HYPERTENSION: ICD-10-CM

## 2024-05-13 RX ORDER — OLMESARTAN MEDOXOMIL 40 MG/1
40 TABLET ORAL DAILY
Qty: 90 TABLET | Refills: 2 | Status: SHIPPED | OUTPATIENT
Start: 2024-05-13

## 2024-05-13 RX ORDER — AMLODIPINE BESYLATE 10 MG/1
10 TABLET ORAL NIGHTLY
Qty: 90 TABLET | Refills: 3 | Status: SHIPPED | OUTPATIENT
Start: 2024-05-13

## 2024-05-13 NOTE — TELEPHONE ENCOUNTER
Care Due:                  Date            Visit Type   Department     Provider  --------------------------------------------------------------------------------                                EP -                              PRIMARY      [facility]  Last Visit: 03-      CARE ([facility])   CARE           Saad Martines                              EP -                              PRIMARY      [facility]  Next Visit: 09-      CARE ([facility])   CARE           Saad Martines                                                            Last  Test          Frequency    Reason                     Performed    Due Date  --------------------------------------------------------------------------------    Office Visit  15 months..  olmesartan, rosuvastatin.  03- 05-    CMP.........  12 months..  olmesartan, rosuvastatin.  04- 04-    Lipid Panel.  12 months..  rosuvastatin.............  03- 03-    [facility] Embedded Care Due Messages. Reference number: [id number].   5/13/2024 12:46:38 PM CDT

## 2024-05-13 NOTE — TELEPHONE ENCOUNTER
----- Message from Billie Stark sent at 5/13/2024 12:34 PM CDT -----  Contact: [phone]  Requesting an RX refill or new RX.  Is this a refill or new RX: REFILL  RX name and strength  olmesartan (BENICAR) 40 MG tablet 90 tablet   Is this a 30 day or 90 day RX: 90  Pharm[facility] and phone #  [id number] [place] [place] - [address]   Phone: [phone]  Fax: [phone]      : Patient first available appointment is September and he is going out of town Friday for 2 weeks and need medications filled, please call to confirm please.       Requesting an RX refill or new RX.  Is this a refill or new RX: refill  RX name and strength   amLODIPine (NORVASC) 10 MG tablet 90 tablet  Is this a 30 day or 90 day RX: 90  Pharmacy name and phone # :  [facility] - [place], [place] - [address]   Phone: [phone]  Fax: [phone]       Patient first available appointment is September and he is going out of town Friday for 2 weeks and need medications filled, please call to confirm please.

## 2024-05-23 DIAGNOSIS — E78.2 MIXED HYPERLIPIDEMIA: Chronic | ICD-10-CM

## 2024-05-23 RX ORDER — ROSUVASTATIN CALCIUM 10 MG/1
10 TABLET, COATED ORAL DAILY
Qty: 90 TABLET | Refills: 0 | Status: SHIPPED | OUTPATIENT
Start: 2024-05-23 | End: 2025-05-23

## 2024-06-24 RX ORDER — DUTASTERIDE 0.5 MG/1
0.5 CAPSULE, LIQUID FILLED ORAL DAILY
Qty: 90 CAPSULE | Refills: 3 | Status: SHIPPED | OUTPATIENT
Start: 2024-06-24

## 2024-06-27 ENCOUNTER — OFFICE VISIT (OUTPATIENT)
Dept: CARDIOLOGY | Facility: CLINIC | Age: 74
End: 2024-06-27
Payer: MEDICARE

## 2024-06-27 ENCOUNTER — LAB VISIT (OUTPATIENT)
Dept: LAB | Facility: HOSPITAL | Age: 74
End: 2024-06-27
Attending: INTERNAL MEDICINE
Payer: MEDICARE

## 2024-06-27 VITALS
SYSTOLIC BLOOD PRESSURE: 135 MMHG | BODY MASS INDEX: 33.04 KG/M2 | HEART RATE: 67 BPM | DIASTOLIC BLOOD PRESSURE: 65 MMHG | WEIGHT: 243.63 LBS

## 2024-06-27 DIAGNOSIS — E78.2 MIXED HYPERLIPIDEMIA: Chronic | ICD-10-CM

## 2024-06-27 DIAGNOSIS — E78.2 MIXED HYPERLIPIDEMIA: Primary | Chronic | ICD-10-CM

## 2024-06-27 DIAGNOSIS — I25.10 CORONARY ARTERY DISEASE INVOLVING NATIVE CORONARY ARTERY OF NATIVE HEART WITHOUT ANGINA PECTORIS: Primary | Chronic | ICD-10-CM

## 2024-06-27 DIAGNOSIS — Z95.1 HX OF CABG: Chronic | ICD-10-CM

## 2024-06-27 DIAGNOSIS — I25.10 CORONARY ARTERY DISEASE INVOLVING NATIVE CORONARY ARTERY OF NATIVE HEART WITHOUT ANGINA PECTORIS: Chronic | ICD-10-CM

## 2024-06-27 DIAGNOSIS — I10 ESSENTIAL HYPERTENSION: Chronic | ICD-10-CM

## 2024-06-27 LAB
ALBUMIN SERPL BCP-MCNC: 4.2 G/DL (ref 3.5–5.2)
ALP SERPL-CCNC: 69 U/L (ref 55–135)
ALT SERPL W/O P-5'-P-CCNC: 23 U/L (ref 10–44)
ANION GAP SERPL CALC-SCNC: 8 MMOL/L (ref 8–16)
AST SERPL-CCNC: 25 U/L (ref 10–40)
BASOPHILS # BLD AUTO: 0.06 K/UL (ref 0–0.2)
BASOPHILS NFR BLD: 1 % (ref 0–1.9)
BILIRUB SERPL-MCNC: 1.3 MG/DL (ref 0.1–1)
BUN SERPL-MCNC: 16 MG/DL (ref 8–23)
CALCIUM SERPL-MCNC: 9.8 MG/DL (ref 8.7–10.5)
CHLORIDE SERPL-SCNC: 108 MMOL/L (ref 95–110)
CHOLEST SERPL-MCNC: 130 MG/DL (ref 120–199)
CHOLEST/HDLC SERPL: 3.2 {RATIO} (ref 2–5)
CO2 SERPL-SCNC: 25 MMOL/L (ref 23–29)
CREAT SERPL-MCNC: 0.9 MG/DL (ref 0.5–1.4)
DIFFERENTIAL METHOD BLD: ABNORMAL
EOSINOPHIL # BLD AUTO: 0.2 K/UL (ref 0–0.5)
EOSINOPHIL NFR BLD: 3.9 % (ref 0–8)
ERYTHROCYTE [DISTWIDTH] IN BLOOD BY AUTOMATED COUNT: 12.4 % (ref 11.5–14.5)
EST. GFR  (NO RACE VARIABLE): >60 ML/MIN/1.73 M^2
GLUCOSE SERPL-MCNC: 95 MG/DL (ref 70–110)
HCT VFR BLD AUTO: 45.6 % (ref 40–54)
HDLC SERPL-MCNC: 41 MG/DL (ref 40–75)
HDLC SERPL: 31.5 % (ref 20–50)
HGB BLD-MCNC: 15.4 G/DL (ref 14–18)
IMM GRANULOCYTES # BLD AUTO: 0.02 K/UL (ref 0–0.04)
IMM GRANULOCYTES NFR BLD AUTO: 0.3 % (ref 0–0.5)
LDLC SERPL CALC-MCNC: 66.2 MG/DL (ref 63–159)
LYMPHOCYTES # BLD AUTO: 1.6 K/UL (ref 1–4.8)
LYMPHOCYTES NFR BLD: 25.6 % (ref 18–48)
MCH RBC QN AUTO: 31.7 PG (ref 27–31)
MCHC RBC AUTO-ENTMCNC: 33.8 G/DL (ref 32–36)
MCV RBC AUTO: 94 FL (ref 82–98)
MONOCYTES # BLD AUTO: 0.5 K/UL (ref 0.3–1)
MONOCYTES NFR BLD: 7.8 % (ref 4–15)
NEUTROPHILS # BLD AUTO: 3.8 K/UL (ref 1.8–7.7)
NEUTROPHILS NFR BLD: 61.4 % (ref 38–73)
NONHDLC SERPL-MCNC: 89 MG/DL
NRBC BLD-RTO: 0 /100 WBC
PLATELET # BLD AUTO: 232 K/UL (ref 150–450)
PMV BLD AUTO: 11.2 FL (ref 9.2–12.9)
POTASSIUM SERPL-SCNC: 4.4 MMOL/L (ref 3.5–5.1)
PROT SERPL-MCNC: 6.9 G/DL (ref 6–8.4)
RBC # BLD AUTO: 4.86 M/UL (ref 4.6–6.2)
SODIUM SERPL-SCNC: 141 MMOL/L (ref 136–145)
TRIGL SERPL-MCNC: 114 MG/DL (ref 30–150)
WBC # BLD AUTO: 6.18 K/UL (ref 3.9–12.7)

## 2024-06-27 PROCEDURE — 3288F FALL RISK ASSESSMENT DOCD: CPT | Mod: CPTII,S$GLB,, | Performed by: INTERNAL MEDICINE

## 2024-06-27 PROCEDURE — 83695 ASSAY OF LIPOPROTEIN(A): CPT | Performed by: INTERNAL MEDICINE

## 2024-06-27 PROCEDURE — 99214 OFFICE O/P EST MOD 30 MIN: CPT | Mod: S$GLB,,, | Performed by: INTERNAL MEDICINE

## 2024-06-27 PROCEDURE — 99999 PR PBB SHADOW E&M-EST. PATIENT-LVL III: CPT | Mod: PBBFAC,,, | Performed by: INTERNAL MEDICINE

## 2024-06-27 PROCEDURE — 3075F SYST BP GE 130 - 139MM HG: CPT | Mod: CPTII,S$GLB,, | Performed by: INTERNAL MEDICINE

## 2024-06-27 PROCEDURE — 85025 COMPLETE CBC W/AUTO DIFF WBC: CPT | Performed by: INTERNAL MEDICINE

## 2024-06-27 PROCEDURE — 82172 ASSAY OF APOLIPOPROTEIN: CPT | Performed by: INTERNAL MEDICINE

## 2024-06-27 PROCEDURE — 80061 LIPID PANEL: CPT | Performed by: INTERNAL MEDICINE

## 2024-06-27 PROCEDURE — 80053 COMPREHEN METABOLIC PANEL: CPT | Performed by: INTERNAL MEDICINE

## 2024-06-27 PROCEDURE — 1101F PT FALLS ASSESS-DOCD LE1/YR: CPT | Mod: CPTII,S$GLB,, | Performed by: INTERNAL MEDICINE

## 2024-06-27 PROCEDURE — 3078F DIAST BP <80 MM HG: CPT | Mod: CPTII,S$GLB,, | Performed by: INTERNAL MEDICINE

## 2024-06-27 PROCEDURE — 36415 COLL VENOUS BLD VENIPUNCTURE: CPT | Performed by: INTERNAL MEDICINE

## 2024-06-27 PROCEDURE — 1159F MED LIST DOCD IN RCRD: CPT | Mod: CPTII,S$GLB,, | Performed by: INTERNAL MEDICINE

## 2024-06-27 RX ORDER — EZETIMIBE 10 MG/1
10 TABLET ORAL DAILY
Qty: 90 TABLET | Refills: 3 | Status: SHIPPED | OUTPATIENT
Start: 2024-06-27 | End: 2025-06-27

## 2024-06-27 NOTE — PROGRESS NOTES
Subjective:   06/27/2024     Patient ID:  Carlos Morgan is a 74 y.o. male who presents for evaulation of Follow-up        He comes in for cardiac follow-up.  He underwent coronary artery bypass grafting in 2007.  He has not had chest pains tightness, PND or orthopnea.    Hypertension is present treated with amlodipine, olmesartan and hydrochlorothiazide.  Blood pressure elevated here, but at home, 135/65.  Hearing loss on lisinopril.      Mixed hyperlipidemia is present treated with moderate-dose statin therapy, rosuvastatin 10 mg nightly.  Not tolerate higher dosages due to myalgia.  Will repeat lipids today.    Recent screening showed no abdominal aneurysm and no carotid stenoses        Patient Active Problem List   Diagnosis    Mixed hyperlipidemia    Coronary artery disease involving native coronary artery of native heart without angina pectoris    Hypertensive heart disease without heart failure    Morbid obesity    Onychomycosis    Vitamin D deficiency    Allergic rhinitis    Fatty liver    Cyst of left kidney    Arthralgia of both hands    Hearing loss of right ear    Tinnitus of right ear    Plantar fasciitis    Low serum HDL    Hx of CABG    Obstructive sleep apnea syndrome    Family history of prostate cancer in father    Benign prostatic hyperplasia with weak urinary stream    Polymyalgia rheumatica    Essential hypertension    Nocturia    Urinary frequency    Gynecomastia, male    Microscopic hematuria        Review of patient's allergies indicates:   Allergen Reactions    Lisinopril Other (See Comments)     Hearing loss         Current Outpatient Medications:     amLODIPine (NORVASC) 10 MG tablet, Take 1 tablet (10 mg total) by mouth every evening., Disp: 90 tablet, Rfl: 3    co-enzyme Q-10 30 mg capsule, Take 500 mg by mouth once daily. , Disp: , Rfl:     dutasteride (AVODART) 0.5 mg capsule, Take 1 capsule (0.5 mg total) by mouth once daily., Disp: 90 capsule, Rfl: 3    olmesartan (BENICAR) 40 MG  tablet, Take 1 tablet (40 mg total) by mouth once daily., Disp: 90 tablet, Rfl: 2    rosuvastatin (CRESTOR) 10 MG tablet, Take 1 tablet (10 mg total) by mouth once daily., Disp: 90 tablet, Rfl: 0    vitamin D (VITAMIN D3) 1000 units Tab, Take 185 mg by mouth once daily. , Disp: , Rfl:     aspirin (ECOTRIN) 81 MG EC tablet, Take 1 tablet (81 mg total) by mouth once daily., Disp: 90 tablet, Rfl: 3     Objective:   Review of Systems   Cardiovascular:  Negative for chest pain, claudication, cyanosis, dyspnea on exertion, irregular heartbeat, leg swelling, near-syncope, orthopnea, palpitations, paroxysmal nocturnal dyspnea and syncope.       Vitals:    06/27/24 0836   BP: 135/65   Pulse: 67       Physical Exam  Vitals reviewed.   Constitutional:       General: He is not in acute distress.     Appearance: He is well-developed.   HENT:      Head: Normocephalic and atraumatic.      Nose: Nose normal.   Eyes:      Conjunctiva/sclera: Conjunctivae normal.      Pupils: Pupils are equal, round, and reactive to light.   Neck:      Vascular: No carotid bruit or JVD.   Cardiovascular:      Rate and Rhythm: Normal rate and regular rhythm.      Pulses: Normal pulses and intact distal pulses.      Heart sounds: Normal heart sounds. No murmur heard.     No friction rub. No gallop.   Pulmonary:      Effort: Pulmonary effort is normal. No respiratory distress.      Breath sounds: Normal breath sounds. No wheezing or rales.   Chest:      Chest wall: No tenderness.   Abdominal:      General: Bowel sounds are normal. There is no distension.      Palpations: Abdomen is soft.      Tenderness: There is no abdominal tenderness.   Musculoskeletal:         General: No tenderness or deformity. Normal range of motion.      Cervical back: Normal range of motion and neck supple.      Right lower leg: No edema.      Left lower leg: No edema.   Skin:     General: Skin is warm and dry.      Findings: No erythema or rash.   Neurological:      Mental  Status: He is alert and oriented to person, place, and time.      Cranial Nerves: No cranial nerve deficit.      Motor: No abnormal muscle tone.      Coordination: Coordination normal.   Psychiatric:         Behavior: Behavior normal.         Thought Content: Thought content normal.         Judgment: Judgment normal.         Lab Results   Component Value Date    CHOL 114 (L) 03/08/2023    CHOL 102 (L) 01/13/2022    CHOL 111 (L) 07/23/2020     Lab Results   Component Value Date    HDL 42 03/08/2023    HDL 39 (L) 01/13/2022    HDL 51 07/23/2020     Lab Results   Component Value Date    LDLCALC 55.2 (L) 03/08/2023    LDLCALC 51.0 (L) 01/13/2022    LDLCALC 41.4 (L) 07/23/2020     Lab Results   Component Value Date    ALT 25 04/11/2023    AST 30 04/11/2023    AST 24 03/08/2023    AST 35 01/13/2022     Lab Results   Component Value Date    CREATININE 0.80 04/11/2023    BUN 14 04/11/2023     04/11/2023    K 4.0 04/11/2023    CO2 27 04/11/2023    CO2 27 03/08/2023    CO2 26 01/13/2022     Lab Results   Component Value Date    HGB 14.4 03/08/2023    HCT 45.0 03/08/2023    HCT 42.2 01/13/2022    HCT 39.1 (L) 07/23/2020       Assessment and Plan:     Coronary artery disease involving native coronary artery of native heart without angina pectoris  Comments:  Remains asymptomatic  Orders:  -     CBC Auto Differential; Future; Expected date: 06/27/2024  -     Comprehensive Metabolic Panel; Future; Expected date: 06/27/2024    Mixed hyperlipidemia  Comments:  Recheck lipids  Orders:  -     Lipoprotein A (LPA); Future; Expected date: 07/27/2024  -     Lipid Panel; Future; Expected date: 06/27/2024  -     Apolipoprotein B; Future; Expected date: 06/27/2024    Essential hypertension  Comments:  Well controlled at home    Hx of CABG  Comments:  Continue aspirin           Follow up in about 1 year (around 6/27/2025).

## 2024-06-29 LAB — APO B SERPL-MCNC: 65 MG/DL

## 2024-07-02 LAB — LPA SERPL-MCNC: 5 MG/DL (ref 0–30)

## 2024-09-13 ENCOUNTER — LAB VISIT (OUTPATIENT)
Dept: LAB | Facility: HOSPITAL | Age: 74
End: 2024-09-13
Attending: INTERNAL MEDICINE
Payer: MEDICARE

## 2024-09-13 ENCOUNTER — OFFICE VISIT (OUTPATIENT)
Dept: PRIMARY CARE CLINIC | Facility: CLINIC | Age: 74
End: 2024-09-13
Payer: MEDICARE

## 2024-09-13 VITALS
BODY MASS INDEX: 32.61 KG/M2 | DIASTOLIC BLOOD PRESSURE: 80 MMHG | SYSTOLIC BLOOD PRESSURE: 130 MMHG | WEIGHT: 240.75 LBS | HEIGHT: 72 IN | HEART RATE: 75 BPM | OXYGEN SATURATION: 97 %

## 2024-09-13 DIAGNOSIS — I25.10 CORONARY ARTERY DISEASE INVOLVING NATIVE CORONARY ARTERY OF NATIVE HEART WITHOUT ANGINA PECTORIS: Primary | ICD-10-CM

## 2024-09-13 DIAGNOSIS — I10 ESSENTIAL HYPERTENSION: ICD-10-CM

## 2024-09-13 DIAGNOSIS — R53.83 FATIGUE, UNSPECIFIED TYPE: ICD-10-CM

## 2024-09-13 DIAGNOSIS — R41.3 MEMORY LOSS: ICD-10-CM

## 2024-09-13 DIAGNOSIS — E78.2 MIXED HYPERLIPIDEMIA: ICD-10-CM

## 2024-09-13 DIAGNOSIS — Z12.5 SCREENING FOR MALIGNANT NEOPLASM OF PROSTATE: ICD-10-CM

## 2024-09-13 DIAGNOSIS — M35.3 POLYMYALGIA RHEUMATICA: ICD-10-CM

## 2024-09-13 DIAGNOSIS — R39.12 BENIGN PROSTATIC HYPERPLASIA WITH WEAK URINARY STREAM: ICD-10-CM

## 2024-09-13 DIAGNOSIS — N40.1 BENIGN PROSTATIC HYPERPLASIA WITH WEAK URINARY STREAM: ICD-10-CM

## 2024-09-13 DIAGNOSIS — G47.33 OBSTRUCTIVE SLEEP APNEA SYNDROME: ICD-10-CM

## 2024-09-13 DIAGNOSIS — I11.9 HYPERTENSIVE HEART DISEASE WITHOUT HEART FAILURE: ICD-10-CM

## 2024-09-13 LAB
ALBUMIN SERPL BCP-MCNC: 4.1 G/DL (ref 3.5–5.2)
ALP SERPL-CCNC: 73 U/L (ref 55–135)
ALT SERPL W/O P-5'-P-CCNC: 21 U/L (ref 10–44)
ANION GAP SERPL CALC-SCNC: 7 MMOL/L (ref 8–16)
AST SERPL-CCNC: 18 U/L (ref 10–40)
BILIRUB SERPL-MCNC: 1.5 MG/DL (ref 0.1–1)
BUN SERPL-MCNC: 16 MG/DL (ref 8–23)
CALCIUM SERPL-MCNC: 9.2 MG/DL (ref 8.7–10.5)
CHLORIDE SERPL-SCNC: 108 MMOL/L (ref 95–110)
CHOLEST SERPL-MCNC: 108 MG/DL (ref 120–199)
CHOLEST/HDLC SERPL: 2.5 {RATIO} (ref 2–5)
CO2 SERPL-SCNC: 25 MMOL/L (ref 23–29)
COMPLEXED PSA SERPL-MCNC: 0.33 NG/ML (ref 0–4)
CREAT SERPL-MCNC: 0.8 MG/DL (ref 0.5–1.4)
EST. GFR  (NO RACE VARIABLE): >60 ML/MIN/1.73 M^2
GLUCOSE SERPL-MCNC: 93 MG/DL (ref 70–110)
HDLC SERPL-MCNC: 43 MG/DL (ref 40–75)
HDLC SERPL: 39.8 % (ref 20–50)
LDLC SERPL CALC-MCNC: 44.4 MG/DL (ref 63–159)
NONHDLC SERPL-MCNC: 65 MG/DL
POTASSIUM SERPL-SCNC: 4 MMOL/L (ref 3.5–5.1)
PROT SERPL-MCNC: 6.8 G/DL (ref 6–8.4)
SODIUM SERPL-SCNC: 140 MMOL/L (ref 136–145)
TRIGL SERPL-MCNC: 103 MG/DL (ref 30–150)
TSH SERPL DL<=0.005 MIU/L-ACNC: 1.98 UIU/ML (ref 0.4–4)

## 2024-09-13 PROCEDURE — 84443 ASSAY THYROID STIM HORMONE: CPT | Performed by: INTERNAL MEDICINE

## 2024-09-13 PROCEDURE — 80053 COMPREHEN METABOLIC PANEL: CPT | Performed by: INTERNAL MEDICINE

## 2024-09-13 PROCEDURE — 80061 LIPID PANEL: CPT | Performed by: INTERNAL MEDICINE

## 2024-09-13 PROCEDURE — 99999 PR PBB SHADOW E&M-EST. PATIENT-LVL IV: CPT | Mod: PBBFAC,,, | Performed by: INTERNAL MEDICINE

## 2024-09-13 PROCEDURE — 84153 ASSAY OF PSA TOTAL: CPT | Performed by: INTERNAL MEDICINE

## 2024-09-13 PROCEDURE — 36415 COLL VENOUS BLD VENIPUNCTURE: CPT | Performed by: INTERNAL MEDICINE

## 2024-09-13 RX ORDER — ROSUVASTATIN CALCIUM 10 MG/1
10 TABLET, COATED ORAL DAILY
Qty: 90 TABLET | Refills: 3 | Status: SHIPPED | OUTPATIENT
Start: 2024-09-13 | End: 2025-09-13

## 2024-09-13 RX ORDER — DUTASTERIDE 0.5 MG/1
0.5 CAPSULE, LIQUID FILLED ORAL DAILY
Qty: 90 CAPSULE | Refills: 3 | Status: SHIPPED | OUTPATIENT
Start: 2024-09-13

## 2024-09-13 RX ORDER — AMLODIPINE BESYLATE 10 MG/1
10 TABLET ORAL NIGHTLY
Qty: 90 TABLET | Refills: 3 | Status: SHIPPED | OUTPATIENT
Start: 2024-09-13

## 2024-09-13 RX ORDER — OLMESARTAN MEDOXOMIL 40 MG/1
40 TABLET ORAL DAILY
Qty: 90 TABLET | Refills: 3 | Status: SHIPPED | OUTPATIENT
Start: 2024-09-13

## 2024-09-13 NOTE — PROGRESS NOTES
[facility] Note    Chief Complaint      Chief Complaint   Patient presents with    Annual Exam       History of Present Illness      History of Present Illness    CHIEF COMPLAINT:  Carlos presents today for follow up.    POLYMYALGIA RHEUMATICA:  He reports occasional flare-ups characterized by finger joint pain. He manages these episodes with prednisone 1 mg for two weeks and occasional use of Aleve, which effectively controls his symptoms.    SLEEP:  He uses his CPAP machine regularly at night and reports sleeping after lunch. He denies concerns about his sleep patterns, attributing increased daytime sleepiness to age and medication regimen.    MEDICATIONS:  He takes blood pressure, cholesterol, and prostate medications. Dr. Hernandez recently prescribed ezetimibe (Zetia) to address his HDL cholesterol levels. He expresses interest in having his cholesterol levels rechecked to assess the effectiveness of the new medication. He has not seen his urologist, Dr. Zaman, in years but continues to receive prescriptions from him. He requests all his long-term medications be transferred to [facility] for 90-day prescriptions, as he previously had some filled at [facility] during a trip in May.    FAMILY HISTORY:  His father was diagnosed with prostate cancer at age 84. At that time, it was determined that no treatment would be pursued due to his father's advanced age and the typically slow-growing nature of prostate cancer.    VACCINATIONS:  He plans to get a flu vaccine when available, likely at [facility]. He received a tetanus vaccine in 2022, the old shingles vaccine in 2014, and is up-to-date on pneumonia shots. He was informed about eligibility for a newer shingles vaccine.    EXERCISE AND ACTIVITIES:  He plays golf regularly with an eight handicap but does not walk the course due to summer heat. He engages in mowing grass for exercise, particularly at his son-in-law's residence where he has been  staying due to his wife's recent ankle surgery.    EAR DISCOMFORT:  He reports an intermittent itching sensation in his ear, described as a constant tickle, but notes it is not severe enough to warrant a separate doctor's visit. He denies any pain or discomfort beyond the itching sensation.    MEMORY:  He reports experiencing challenges with short-term memory, noting it is more impaired than previously.    PAST MEDICAL HISTORY:  He reports a history of shingles approximately 20 years ago.    SOCIAL HISTORY:  He has been assisting with his wife's care since she broke her ankle six weeks ago, requiring surgery performed by Dr. Bonner.        CAD: Sees Dr. Hernandez.  Had nuclear stress test this year which was normal. S/P CABG 2007.  On ASA, olmesartan, crestor.    HTN: BP at goal on amlodipine, olmesartan, hydrochlorothiazide.     HLD: Controlled on crestor, zetia.  LDL 41 last check, due for repeat.    BPH, gynecomastia: Controlled on avodart.  Sees Dr. Gonzalez.  Off arimidex, previously on to prevent elevated estrogen and worsening breast tissue development.  PMR: Sees Dr. Obrien, PMR.  Started with symptoms of hand pains that disseminated in 2019.  Worsened early 2020.  Had significant improvement with prednisone Summer 2020.  Off prednisone.  Still with soreness after 30 minutes or more of immobility.    CAROLINE: Compliant with CPAP which improves sleep quality and energy levels.  Last CAROLINE evaluation 2005.    Vit D deficiency: Controlled on vit d supplement.  Flu shot discussed.  TdAP 2022.  Prevnar UTD.  Pneumovax UTD.  Zostavax 2014.  COVID vaccine UTD w/ booster.  Shingrix discussed.  Cscope previously 2007.  Previous hx of polyps.       Assessment/Plan     Carlos Morgan is a 74 y.o.male with:    Assessment & Plan    POLYMYALGIA RHEUMATICA:  - Assessed patient's reported polymyalgia flare-ups, noting current management with prednisone and occasional Aleve appears effective.  - Continued prednisone 1 mg for 2  weeks as needed for polymyalgia flare-ups.  - Continued Aleve as needed for polymyalgia symptoms.    DAYTIME SLEEPINESS:  - Evaluated reported daytime sleepiness, determining it is likely age-appropriate and potentially medication-related.  - Explained that daytime sleepiness is common with aging and can be a side effect of medications used to manage blood pressure, cholesterol, and prostate issues.    PROSTATE HEALTH:  - Considered PSA testing, acknowledging reduced utility in prostate cancer screening for patients over 75 years old.  - Discussed reduced emphasis on prostate cancer screening in men over 75 due to the typically slow progression of the disease.  - Ordered PSA test.  - Refilled prostate medication through [facility] mail order pharmacy.    COVID-19 VACCINATION:  - Evaluated need for COVID-19 vaccination, determining it is not currently necessary given low hospitalization rates and potential immune response.  - Provided information on current COVID-19 vaccine efficacy, noting it primarily reduces severe cases rather than preventing infection or transmission.    EAR DISCOMFORT:  - Assessed ear discomfort, identifying hair growth as the likely cause.    MEMORY CONCERNS:  - Considered memory concerns, recommending baseline neuropsychological testing.  - Emphasized the importance of early detection and treatment options for dementia.  - Referred patient to neuropsychologist for baseline memory testing.  - Contact the office if interested in scheduling neuropsychological testing for memory assessment.    EXERCISE:  - Carlos to continue current exercise routine, including mowing the lawn.    MEDICATIONS/SUPPLEMENTS:  - Carlos to call [facility] pharmacy to stop automatic refills and transfer prescriptions to [facility] mail order pharmacy.  - Refilled blood pressure medications through [facility] mail order pharmacy.  - Refilled cholesterol medication through [facility] mail order  pharmacy.    LABS:  - Reviewed June labs results, noting liver function, kidney function, blood sugar, cholesterol, and blood counts all within normal limits.  - Ordered repeat labs to check cholesterol levels.  - Ordered thyroid function test.  - Complete ordered labs, which can be done immediately after the visit.    INFLUENZA VACCINATION:  - Follow up for flu vaccine when available.       1. Hypertensive heart disease without heart failure    2. Coronary artery disease involving native coronary artery of native heart without angina pectoris    3. Mixed hyperlipidemia  - Comprehensive Metabolic Panel; Future  - Lipid Panel; Future    4. Benign prostatic hyperplasia with weak urinary stream    5. Polymyalgia rheumatica    6. Obstructive sleep apnea syndrome    7. Fatigue, unspecified type  - TSH; Future    8. Screening for malignant neoplasm of prostate  - PSA, Screening; Future      Chronic conditions status updated as per HPI.  Other than changes above, cont current medications and maintain follow up with specialists.  Follow up in about 6 months (around 3/13/2025) for Follow up visit.    Future Appointments   Date Time Provider Department Center   9/13/2024  8:00 AM Saad Bella MD [facility]           Past Medical History:  Past Medical History:   Diagnosis Date    Disorder of kidney and ureter     Plantar fasciitis     Sleep apnea 2005    cpap, 2005    Urinary tract infection        Past Surgical History:   has a past surgical history that includes Coronary artery bypass graft (2007); Tonsillectomy (1955); Cataract extraction, bilateral (Bilateral, 2021); Hand surgery (Right, 2021); and Eye surgery (2 years ago).    Family History:  family history includes Cancer in his father; Depression in his mother; Diabetes in his mother; Heart disease in his maternal grandfather; Hypertension in his brother and mother.     Social History:  Social History     Tobacco Use    Smoking status: Former      Current packs/day: 0.00     Average packs/day: 0.5 packs/day for 15.4 years (7.7 ttl pk-yrs)     Types: Cigarettes     Start date: 1967     Quit date: 1983     Years since quittin.7    Smokeless tobacco: Never   Substance Use Topics    Alcohol use: Yes     Alcohol/week: 1.0 standard drink of alcohol     Types: 1 Glasses of wine per week     Comment: socially    Drug use: Never       Medications:  Outpatient Encounter Medications as of 2024   Medication Sig Dispense Refill    amLODIPine (NORVASC) 10 MG tablet Take 1 tablet (10 mg total) by mouth every evening. 90 tablet 3    aspirin (ECOTRIN) 81 MG EC tablet Take 1 tablet (81 mg total) by mouth once daily. 90 tablet 3    co-enzyme Q-10 30 mg capsule Take 500 mg by mouth once daily.       dutasteride (AVODART) 0.5 mg capsule Take 1 capsule (0.5 mg total) by mouth once daily. 90 capsule 3    ezetimibe (ZETIA) 10 mg tablet Take 1 tablet (10 mg total) by mouth once daily. 90 tablet 3    olmesartan (BENICAR) 40 MG tablet Take 1 tablet (40 mg total) by mouth once daily. 90 tablet 2    rosuvastatin (CRESTOR) 10 MG tablet Take 1 tablet (10 mg total) by mouth once daily. 90 tablet 0    vitamin D (VITAMIN D3) 1000 units Tab Take 185 mg by mouth once daily.        No facility-administered encounter medications on file as of 2024.       Allergies:  Review of patient's allergies indicates:   Allergen Reactions    Lisinopril Other (See Comments)     Hearing loss       Health Maintenance:  Immunization History   Administered Date(s) Administered    COVID-19 MRNA, LN-S PF (MODERNA HALF 0.25 ML DOSE) 2021    COVID-19, MRNA, LN-S, PF (MODERNA FULL 0.5 ML DOSE) 2021, 2021    Influenza (FLUAD) - Quadrivalent - Adjuvanted - PF *Preferred* (65+) 2020, 2021    Influenza (FLUAD) - Trivalent - Adjuvanted - PF (65+) 10/08/2019    Influenza - High Dose - PF (65 years and older) 10/31/2014, 10/06/2015, 2016, 10/09/2017, 10/04/2018     Influenza - Quadrivalent - High Dose - PF (65 years and older) 12/04/2023    Influenza - Quadrivalent - PF *Preferred* (6 months and older) 12/03/2021    Pneumococcal Conjugate - 13 Valent 11/05/2018    Pneumococcal Polysaccharide - 23 Valent 11/18/2014    Tdap 05/02/2022    Zoster 11/18/2014      Health Maintenance   Topic Date Due    Shingles Vaccine (2 of 3) 01/13/2015    Aspirin/Antiplatelet Therapy  02/22/2020    Lipid Panel  06/27/2029    TETANUS VACCINE  05/02/2032    Hepatitis C Screening  Completed        Physical Exam      Vital Signs  Pulse: 75  SpO2: 97 %  BP: 130/80  BP Location: Right arm  Patient Position: Sitting  Pain Score: 0-No pain  Height and Weight  Height: 6' (182.9 cm)  Weight: 109.2 kg (240 lb 11.9 oz)  BSA (Calculated - sq m): 2.36 sq meters  BMI (Calculated): 32.6  Weight in (lb) to have BMI = 25: 183.9]    Physical Exam      Physical Exam  Vitals reviewed.   Constitutional:       Appearance: He is well-developed.   HENT:      Head: Normocephalic and atraumatic.      Right Ear: External ear normal.      Left Ear: External ear normal.   Cardiovascular:      Rate and Rhythm: Normal rate and regular rhythm.      Heart sounds: Normal heart sounds. No murmur heard.  Pulmonary:      Effort: Pulmonary effort is normal.      Breath sounds: Normal breath sounds. No wheezing or rales.   Abdominal:      General: Bowel sounds are normal.      Palpations: Abdomen is soft.         Laboratory:    Results      CBC:  Recent Labs   Lab 01/13/22  0801 03/08/23  0846 06/27/24  0902   WBC 5.88 5.44 6.18   RBC 4.61 4.72 4.86   Hemoglobin 14.0 14.4 15.4   Hematocrit 42.2 45.0 45.6   Platelets 215 241 232   MCV 92 95 94   MCH 30.4 30.5 31.7 H   MCHC 33.2 32.0 33.8     CMP:  Recent Labs   Lab 03/08/23  0846 04/11/23  1314 06/27/24  0902   Glucose 98 101 95   Calcium 9.7 9.2 9.8   Albumin 4.4 4.6 4.2   Total Protein 7.3 7.5 6.9   Sodium 139 141 141   Potassium 4.2 4.0 4.4   CO2 27 27 25   Chloride 104 105 108   BUN  18 14 16   Alkaline Phosphatase 69 58 69   ALT 21 25 23   AST 24 30 25   Total Bilirubin 1.8 H 1.3 H 1.3 H     URINALYSIS:  Recent Labs   Lab 10/14/21  1051   Color, UA Yellow   Specific Gravity, UA 1.025   pH, UA 8.0   Protein, UA Negative   Nitrite, UA Negative   Leukocytes, UA Negative   Urobilinogen, UA Negative      LIPIDS:  Recent Labs   Lab 01/13/22  0801 03/08/23  0846 06/27/24  0902   HDL 39 L 42 41   Cholesterol 102 L 114 L 130   Triglycerides 60 84 114   LDL Cholesterol 51.0 L 55.2 L 66.2   HDL/Cholesterol Ratio 38.2 36.8 31.5   Non-HDL Cholesterol 63 72 89   Total Cholesterol/HDL Ratio 2.6 2.7 3.2     TSH:      A1C:            This note was generated with the assistance of ambient listening technology. Verbal consent was obtained by the patient and accompanying visitor(s) for the recording of patient appointment to facilitate this note. I attest to having reviewed and edited the generated note for accuracy, though some syntax or spelling errors may persist. Please contact the author of this note for any clarification.      Saad Bella MD  [facility]

## 2025-03-19 ENCOUNTER — OFFICE VISIT (OUTPATIENT)
Dept: PRIMARY CARE CLINIC | Facility: CLINIC | Age: 75
End: 2025-03-19
Payer: MEDICARE

## 2025-03-19 VITALS
BODY MASS INDEX: 32.76 KG/M2 | OXYGEN SATURATION: 97 % | DIASTOLIC BLOOD PRESSURE: 70 MMHG | HEART RATE: 76 BPM | WEIGHT: 241.88 LBS | HEIGHT: 72 IN | SYSTOLIC BLOOD PRESSURE: 120 MMHG

## 2025-03-19 DIAGNOSIS — R39.12 BENIGN PROSTATIC HYPERPLASIA WITH WEAK URINARY STREAM: ICD-10-CM

## 2025-03-19 DIAGNOSIS — M35.3 POLYMYALGIA RHEUMATICA: ICD-10-CM

## 2025-03-19 DIAGNOSIS — I25.10 CORONARY ARTERY DISEASE INVOLVING NATIVE CORONARY ARTERY OF NATIVE HEART WITHOUT ANGINA PECTORIS: Primary | Chronic | ICD-10-CM

## 2025-03-19 DIAGNOSIS — E78.2 MIXED HYPERLIPIDEMIA: Chronic | ICD-10-CM

## 2025-03-19 DIAGNOSIS — I11.9 HYPERTENSIVE HEART DISEASE WITHOUT HEART FAILURE: ICD-10-CM

## 2025-03-19 DIAGNOSIS — N40.1 BENIGN PROSTATIC HYPERPLASIA WITH WEAK URINARY STREAM: ICD-10-CM

## 2025-03-19 PROCEDURE — 3078F DIAST BP <80 MM HG: CPT | Mod: CPTII,S$GLB,, | Performed by: INTERNAL MEDICINE

## 2025-03-19 PROCEDURE — 99214 OFFICE O/P EST MOD 30 MIN: CPT | Mod: S$GLB,,, | Performed by: INTERNAL MEDICINE

## 2025-03-19 PROCEDURE — 99999 PR PBB SHADOW E&M-EST. PATIENT-LVL III: CPT | Mod: PBBFAC,,, | Performed by: INTERNAL MEDICINE

## 2025-03-19 PROCEDURE — 1126F AMNT PAIN NOTED NONE PRSNT: CPT | Mod: CPTII,S$GLB,, | Performed by: INTERNAL MEDICINE

## 2025-03-19 PROCEDURE — 3074F SYST BP LT 130 MM HG: CPT | Mod: CPTII,S$GLB,, | Performed by: INTERNAL MEDICINE

## 2025-03-19 PROCEDURE — 1159F MED LIST DOCD IN RCRD: CPT | Mod: CPTII,S$GLB,, | Performed by: INTERNAL MEDICINE

## 2025-03-19 RX ORDER — PREDNISONE 1 MG/1
1 TABLET ORAL DAILY
Qty: 14 TABLET | Refills: 5 | Status: SHIPPED | OUTPATIENT
Start: 2025-03-19

## 2025-03-19 RX ORDER — EZETIMIBE 10 MG/1
10 TABLET ORAL DAILY
Qty: 90 TABLET | Refills: 3 | Status: SHIPPED | OUTPATIENT
Start: 2025-03-19 | End: 2026-03-19

## 2025-03-19 NOTE — PROGRESS NOTES
[facility] Note    Chief Complaint      Chief Complaint   Patient presents with    Follow-up     6 month       History of Present Illness      History of Present Illness    CHIEF COMPLAINT:  Mr. Morgan presents today for follow up    DERMATOLOGY:  He underwent basal cell carcinoma removal from the back of his neck two weeks ago by Dr. Tracey and is due for suture removal.    GENITOURINARY:  He reports urinary sensation now originates from the penis rather than the bladder. He endorses good urinary flow and can play up to 18 holes of golf without requiring bathroom breaks.    POLYMYALGIA RHEUMATICA:  He takes prednisone 1mg course for two weeks every three months for management. Disease typically flares up in one knuckle.    MEDICATIONS:  Current medications include Olmesartan, Amlodipine, Rosuvastatin, Ezetimibe, Dutasteride, and Vitamin D.    LABS:  He expresses concern about low cholesterol levels. LDL was 44 mg/dL and ApoB was 65.      ROS:  Cardiovascular: -chest pain  Respiratory: -shortness of breath  Musculoskeletal: +joint pain  Skin: +lesion, +lumps/masses  Male Genitourinary: +urgency       CAD: Sees Dr. Hernandez.  Had nuclear stress test this year which was normal. S/P CABG 2007.  On ASA, olmesartan, crestor.    HTN: BP at goal on amlodipine, olmesartan, hydrochlorothiazide.     HLD: Controlled on crestor, zetia.  LDL 41 last check, due for repeat.    BPH, gynecomastia: Controlled on avodart.  Sees Dr. Gonzalez.  Off arimidex, previously on to prevent elevated estrogen and worsening breast tissue development.  PMR: Sees Dr. Obrien, PMR.  Started with symptoms of hand pains that disseminated in 2019.  Worsened early 2020.  Had significant improvement with prednisone Summer 2020.  Off prednisone.  Still with soreness after 30 minutes or more of immobility.    CAROLINE: Compliant with CPAP which improves sleep quality and energy levels.  Last CAROLINE evaluation 2005.    Vit D deficiency: Controlled  on vit d supplement.  Flu shot UTD.  TdAP 2022.  Prevnar UTD.  Pneumovax UTD.  Zostavax 2014.  COVID vaccine UTD w/ booster.  Shingrix discussed.  Cscope previously 2007.  Previous hx of polyps.       Assessment/Plan     Carlos Morgan is a 74 y.o.male with:    Assessment & Plan    Reviewed recent basal cell removal on neck by dermatologist Dr. Tracey.  Assessed urinary symptoms, noting change in urge sensation from bladder to penis. Explained that change in urinary urge sensation is likely due to aging nerves in the area.  Evaluated Polymyalgia Rheumatica management, patient using low-dose prednisone for flares.  Considered cardiovascular risk factors and current cholesterol management. Reviewed recent lab results, including LDL and ApoB levels, finding cholesterol management to be excellent.  Assessed overall health status through physical exam.    BASAL CELL CARCINOMA OF SCALP AND NECK:  - Monitored the patient's condition following basal cell carcinoma removal from the back of the neck two weeks ago.  - Noted that Dr. Tracey, a dermatologist, surgically removed the basal cell carcinoma.  - Advised the patient to get stitches removed.    POLYMYALGIA RHEUMATICA:  - Acknowledged the patient's ongoing management of polymyalgia rheumatica.  - Noted that the patient experiences flare-ups approximately every 3 months, particularly in one knuckle.  - Prescribed Prednisone 1 mg tablets, 14 tablets with 5 refills, to be taken daily for 2 weeks as needed for Polymyalgia Rheumatica flares (approximately 2-3 times per year).  - Instructed the patient to continue the current regimen of taking a 2-week course of 1mg prednisone 2-3 times per year to manage flare-ups.  - Monitored the patient's intermittent use of prednisone for polymyalgia rheumatica flare-ups.  - Prescribed 1mg prednisone tablets for intermittent use, 14 tablets with 5 refills.    HYPERLIPIDEMIA MANAGEMENT:  - Monitored the patient's cholesterol  levels, noting recent labs show very low levels.  - Evaluated LDL cholesterol at 44, which is below the target of 50 for patients with previous bypasses.  - Noted ApoB test result of 65, slightly above the target of less than 60.  - Assessed that the patient's cholesterol management is excellent.  - Explained the significance of ApoB as a newer cholesterol marker.  - Changed Ezetimibe 10 mg daily prescription to [facility], 90 tablets with 3 refills.  - Continued Rosuvastatin prescription.  - Maintained current aggressive but evidence-based cholesterol management.  - Educated the patient on latest cholesterol management guidelines for patients with previous bypass surgery, aiming for LDL less than 50 mg/dL.  - Continued aggressive cholesterol management due to bypass history.    CORONARY BYPASS HISTORY:  - Noted the patient's history of coronary bypass.  - Performed cardiovascular exam, finding no issues.    OTHER MEDICATIONS AND FOLLOW-UP:  - Discussed dormancy of shingles virus in the body and availability of a new shingles vaccine.  - Continued Olmesartan, Amlodipine, Dutasteride, and Vitamin D. Follow up in 6 months.  - Contact the office if any issues arise before the next scheduled appointment.         1. Coronary artery disease involving native coronary artery of native heart without angina pectoris    2. Hypertensive heart disease without heart failure    3. Mixed hyperlipidemia  - ezetimibe (ZETIA) 10 mg tablet; Take 1 tablet (10 mg total) by mouth once daily.  Dispense: 90 tablet; Refill: 3    4. Polymyalgia rheumatica  - predniSONE (DELTASONE) 1 MG tablet; Take 1 tablet (1 mg total) by mouth once daily.  Dispense: 14 tablet; Refill: 5    5. Benign prostatic hyperplasia with weak urinary stream      Chronic conditions status updated as per HPI.  Other than changes above, cont current medications and maintain follow up with specialists.  Follow up in about 6 months (around 9/19/2025) for Annual  preventative visit.    Future Appointments   Date Time Provider Department Center   9/19/2025 10:00 AM Saad Bella MD [facility]           Past Medical History:  Past Medical History:   Diagnosis Date    Cataract 2019    New lenses installed in August    Disorder of kidney and ureter     Hearing loss Nov 2018    Reaction to lisinopril.    Hypertension 1980s    Obesity 2007    Plantar fasciitis     Seasonal allergies Fall, 2005    During [place] cleanup    Sleep apnea 2005    cpap, 2005    Urinary tract infection        Past Surgical History:   has a past surgical history that includes Coronary artery bypass graft (2007); Tonsillectomy (1955); Cataract extraction, bilateral (Bilateral, 2021); Hand surgery (Right, 2021); Eye surgery (2 years ago); and Cardiac surgery (2007).    Family History:  family history includes Cancer in his father; Depression in his mother; Diabetes in his mother; Heart disease in his maternal grandfather; Hypertension in his brother and mother.     Social History:  Social History[1]    Medications:  Encounter Medications[2]    Allergies:  Review of patient's allergies indicates:   Allergen Reactions    Lisinopril Other (See Comments)     Hearing loss       Health Maintenance:  Immunization History   Administered Date(s) Administered    COVID-19 MRNA, LN-S PF (MODERNA HALF 0.25 ML DOSE) 11/21/2021    COVID-19, MRNA, LN-S, PF (MODERNA FULL 0.5 ML DOSE) 01/28/2021, 02/25/2021    Influenza (FLUAD) - Quadrivalent - Adjuvanted - PF *Preferred* (65+) 09/18/2020, 11/05/2021    Influenza - Quadrivalent - High Dose - PF (65 years and older) 12/04/2023    Influenza - Quadrivalent - PF *Preferred* (6 months and older) 12/03/2021    Influenza - Trivalent - Fluad - Adjuvanted - PF (65 years and older 10/08/2019    Influenza - Trivalent - Fluzone High Dose - PF (65 years and older) 10/31/2014, 10/06/2015, 11/03/2016, 10/09/2017, 10/04/2018, 10/09/2024    Pneumococcal Conjugate - 13  Valent 11/05/2018    Pneumococcal Polysaccharide - 23 Valent 11/18/2014    RSVpreF (Arexvy) 10/09/2024    Tdap 05/02/2022    Zoster 11/18/2014      Health Maintenance   Topic Date Due    Shingles Vaccine (2 of 3) 01/13/2015    Aspirin/Antiplatelet Therapy  02/22/2020    COVID-19 Vaccine (4 - 2024-25 season) 09/01/2024    Lipid Panel  09/13/2029    TETANUS VACCINE  05/02/2032    Hepatitis C Screening  Completed    Influenza Vaccine  Completed    RSV Vaccine (Age 60+ and Pregnant patients)  Completed    Pneumococcal Vaccines (Age 50+)  Completed        Physical Exam      Vital Signs  Pulse: 76  SpO2: 97 %  BP: 120/70  BP Location: Left arm  Patient Position: Sitting  Pain Score: 0-No pain  Height and Weight  Height: 6' (182.9 cm)  Weight: 109.7 kg (241 lb 13.5 oz)  BSA (Calculated - sq m): 2.36 sq meters  BMI (Calculated): 32.8  Weight in (lb) to have BMI = 25: 183.9]    Physical Exam    General: No acute distress. Well-developed. Well-nourished.  Eyes: EOMI. Sclerae anicteric.  HENT: Normocephalic. Atraumatic. Nares patent. Moist oral mucosa.  Ears: Bilateral TMs clear. Bilateral EACs clear.  Cardiovascular: Regular rate. Regular rhythm. No murmurs. No rubs. No gallops. Normal S1, S2.  Respiratory: Normal respiratory effort. Clear to auscultation bilaterally. No rales. No rhonchi. No wheezing.  Abdomen: Soft. Non-tender. Non-distended. Normoactive bowel sounds.  Musculoskeletal: No  obvious deformity.  Extremities: No lower extremity edema.  Neurological: Alert & oriented x3. No slurred speech. Normal gait.  Psychiatric: Normal mood. Normal affect. Good insight. Good judgment.  Skin: Warm. Dry. No rash.         Physical Exam  Vitals reviewed.   Constitutional:       Appearance: He is well-developed.   HENT:      Head: Normocephalic and atraumatic.      Right Ear: External ear normal.      Left Ear: External ear normal.   Cardiovascular:      Rate and Rhythm: Normal rate and regular rhythm.      Heart sounds: Normal  heart sounds. No murmur heard.  Pulmonary:      Effort: Pulmonary effort is normal.      Breath sounds: Normal breath sounds. No wheezing or rales.   Abdominal:      General: Bowel sounds are normal.      Palpations: Abdomen is soft.         Laboratory:    Results              CBC:  Recent Labs   Lab 03/08/23  0846 06/27/24  0902   WBC 5.44 6.18   RBC 4.72 4.86   Hemoglobin 14.4 15.4   Hematocrit 45.0 45.6   Platelets 241 232   MCV 95 94   MCH 30.5 31.7 H   MCHC 32.0 33.8     CMP:  Recent Labs   Lab 04/11/23  1314 06/27/24  0902 09/13/24  0810   Glucose 101 95 93   Calcium 9.2 9.8 9.2   Albumin 4.6 4.2 4.1   Total Protein 7.5 6.9 6.8   Sodium 141 141 140   Potassium 4.0 4.4 4.0   CO2 27 25 25   Chloride 105 108 108   BUN 14 16 16   Alkaline Phosphatase 58 69 73   ALT 25 23 21   AST 30 25 18   Total Bilirubin 1.3 H 1.3 H 1.5 H     URINALYSIS:       LIPIDS:  Recent Labs   Lab 03/08/23  0846 06/27/24  0902 09/13/24  0810   TSH  --   --  1.983   HDL 42 41 43   Cholesterol 114 L 130 108 L   Triglycerides 84 114 103   LDL Cholesterol 55.2 L 66.2 44.4 L   HDL/Cholesterol Ratio 36.8 31.5 39.8   Non-HDL Cholesterol 72 89 65   Total Cholesterol/HDL Ratio 2.7 3.2 2.5     TSH:  Recent Labs   Lab 09/13/24  0810   TSH 1.983     A1C:            This note was generated with the assistance of ambient listening technology. Verbal consent was obtained by the patient and accompanying visitor(s) for the recording of patient appointment to facilitate this note. I attest to having reviewed and edited the generated note for accuracy, though some syntax or spelling errors may persist. Please contact the author of this note for any clarification.      Saad Bella MD  [facility]                       [1]   Social History  Tobacco Use    Smoking status: Former     Current packs/day: 0.00     Average packs/day: 0.5 packs/day for 15.4 years (7.7 ttl pk-yrs)     Types: Cigarettes     Start date: 8/11/1967     Quit date: 1/1/1983      Years since quittin.2    Smokeless tobacco: Never   Substance Use Topics    Alcohol use: Yes     Alcohol/week: 1.0 standard drink of alcohol     Types: 1 Glasses of wine per week     Comment: socially    Drug use: Never   [2]   Outpatient Encounter Medications as of 3/19/2025   Medication Sig Dispense Refill    amLODIPine (NORVASC) 10 MG tablet Take 1 tablet (10 mg total) by mouth every evening. 90 tablet 3    aspirin (ECOTRIN) 81 MG EC tablet Take 1 tablet (81 mg total) by mouth once daily. 90 tablet 3    co-enzyme Q-10 30 mg capsule Take 500 mg by mouth once daily.       dutasteride (AVODART) 0.5 mg capsule Take 1 capsule (0.5 mg total) by mouth once daily. 90 capsule 3    ezetimibe (ZETIA) 10 mg tablet Take 1 tablet (10 mg total) by mouth once daily. 90 tablet 3    olmesartan (BENICAR) 40 MG tablet Take 1 tablet (40 mg total) by mouth once daily. 90 tablet 3    predniSONE (DELTASONE) 1 MG tablet Take 1 tablet (1 mg total) by mouth once daily. 14 tablet 5    rosuvastatin (CRESTOR) 10 MG tablet Take 1 tablet (10 mg total) by mouth once daily. 90 tablet 3    vitamin D (VITAMIN D3) 1000 units Tab Take 185 mg by mouth once daily.       [DISCONTINUED] ezetimibe (ZETIA) 10 mg tablet Take 1 tablet (10 mg total) by mouth once daily. 90 tablet 3     No facility-administered encounter medications on file as of 3/19/2025.

## 2025-08-14 DIAGNOSIS — I10 ESSENTIAL HYPERTENSION: ICD-10-CM

## 2025-08-14 RX ORDER — AMLODIPINE BESYLATE 10 MG/1
10 TABLET ORAL NIGHTLY
Qty: 90 TABLET | Refills: 2 | Status: SHIPPED | OUTPATIENT
Start: 2025-08-14

## 2025-08-15 ENCOUNTER — OFFICE VISIT (OUTPATIENT)
Dept: SPORTS MEDICINE | Facility: CLINIC | Age: 75
End: 2025-08-15
Payer: MEDICARE

## 2025-08-15 ENCOUNTER — HOSPITAL ENCOUNTER (OUTPATIENT)
Dept: RADIOLOGY | Facility: HOSPITAL | Age: 75
Discharge: HOME | End: 2025-08-15
Attending: ORTHOPAEDIC SURGERY
Payer: MEDICARE

## 2025-08-15 VITALS
HEART RATE: 70 BPM | DIASTOLIC BLOOD PRESSURE: 81 MMHG | SYSTOLIC BLOOD PRESSURE: 180 MMHG | WEIGHT: 235 LBS | HEIGHT: 72 IN | BODY MASS INDEX: 31.83 KG/M2

## 2025-08-15 DIAGNOSIS — G89.29 CHRONIC PAIN OF RIGHT KNEE: Primary | ICD-10-CM

## 2025-08-15 DIAGNOSIS — M17.0 BILATERAL PRIMARY OSTEOARTHRITIS OF KNEE: ICD-10-CM

## 2025-08-15 DIAGNOSIS — M25.561 CHRONIC PAIN OF RIGHT KNEE: Primary | ICD-10-CM

## 2025-08-15 DIAGNOSIS — M25.561 RIGHT KNEE PAIN, UNSPECIFIED CHRONICITY: ICD-10-CM

## 2025-08-15 PROCEDURE — 73564 X-RAY EXAM KNEE 4 OR MORE: CPT | Mod: 26,50,, | Performed by: RADIOLOGY

## 2025-08-15 PROCEDURE — 73564 X-RAY EXAM KNEE 4 OR MORE: CPT | Mod: TC,50

## 2025-08-15 PROCEDURE — 99999 PR PBB SHADOW E&M-EST. PATIENT-LVL III: CPT | Mod: PBBFAC,,, | Performed by: ORTHOPAEDIC SURGERY

## 2025-08-18 DIAGNOSIS — R39.12 BENIGN PROSTATIC HYPERPLASIA WITH WEAK URINARY STREAM: ICD-10-CM

## 2025-08-18 DIAGNOSIS — N40.1 BENIGN PROSTATIC HYPERPLASIA WITH WEAK URINARY STREAM: ICD-10-CM

## 2025-08-19 RX ORDER — DUTASTERIDE 0.5 MG/1
0.5 CAPSULE, LIQUID FILLED ORAL
Qty: 90 CAPSULE | Refills: 3 | Status: SHIPPED | OUTPATIENT
Start: 2025-08-19

## 2025-08-21 ENCOUNTER — PATIENT OUTREACH (OUTPATIENT)
Dept: ADMINISTRATIVE | Facility: HOSPITAL | Age: 75
End: 2025-08-21
Payer: MEDICARE